# Patient Record
Sex: FEMALE | Race: WHITE | NOT HISPANIC OR LATINO
[De-identification: names, ages, dates, MRNs, and addresses within clinical notes are randomized per-mention and may not be internally consistent; named-entity substitution may affect disease eponyms.]

---

## 2017-04-04 ENCOUNTER — APPOINTMENT (OUTPATIENT)
Dept: HEMATOLOGY ONCOLOGY | Facility: CLINIC | Age: 82
End: 2017-04-04

## 2017-04-04 VITALS
SYSTOLIC BLOOD PRESSURE: 150 MMHG | RESPIRATION RATE: 14 BRPM | HEART RATE: 80 BPM | BODY MASS INDEX: 30.18 KG/M2 | HEIGHT: 62 IN | WEIGHT: 164 LBS | TEMPERATURE: 97.6 F | DIASTOLIC BLOOD PRESSURE: 90 MMHG

## 2017-04-06 LAB
BASOPHILS # BLD: 0.03 TH/MM3
BASOPHILS NFR BLD: 0.3 %
EOSINOPHIL # BLD: 0.2 TH/MM3
EOSINOPHIL NFR BLD: 2 %
ERYTHROCYTE [DISTWIDTH] IN BLOOD BY AUTOMATED COUNT: 13 %
GRANULOCYTES # BLD: 7.1 TH/MM3
GRANULOCYTES NFR BLD: 71.6 %
HCT VFR BLD AUTO: 36.2 %
HGB BLD-MCNC: 11.7 G/DL
IMM GRANULOCYTES # BLD: 0.09 TH/MM3
IMM GRANULOCYTES NFR BLD: 0.9 %
LYMPHOCYTES # BLD: 1.9 TH/MM3
LYMPHOCYTES NFR BLD: 19.2 %
MCH RBC QN AUTO: 28.7 PG
MCHC RBC AUTO-ENTMCNC: 32.3 G/DL
MCV RBC AUTO: 88.7 FL
MONOCYTES # BLD: 0.6 TH/MM3
MONOCYTES NFR BLD: 6 %
PLATELET # BLD: 81 TH/MM3
PMV BLD AUTO: 10.5 FL
RBC # BLD AUTO: 4.08 MIL/MM3
WBC # BLD: 9.92 TH/MM3

## 2017-04-18 ENCOUNTER — APPOINTMENT (OUTPATIENT)
Dept: HEMATOLOGY ONCOLOGY | Facility: CLINIC | Age: 82
End: 2017-04-18

## 2017-04-18 VITALS
DIASTOLIC BLOOD PRESSURE: 85 MMHG | WEIGHT: 163 LBS | BODY MASS INDEX: 30 KG/M2 | TEMPERATURE: 96.4 F | HEART RATE: 68 BPM | SYSTOLIC BLOOD PRESSURE: 185 MMHG | RESPIRATION RATE: 14 BRPM | HEIGHT: 62 IN

## 2017-04-18 LAB
BASOPHILS # BLD: 0.05 TH/MM3
BASOPHILS NFR BLD: 0.5 %
EOSINOPHIL # BLD: 0.42 TH/MM3
EOSINOPHIL NFR BLD: 4 %
ERYTHROCYTE [DISTWIDTH] IN BLOOD BY AUTOMATED COUNT: 12.8 %
GRANULOCYTES # BLD: 7.1 TH/MM3
GRANULOCYTES NFR BLD: 66.8 %
HCT VFR BLD AUTO: 38.1 %
HGB BLD-MCNC: 12.2 G/DL
IMM GRANULOCYTES # BLD: 0.05 TH/MM3
IMM GRANULOCYTES NFR BLD: 0.5 %
LYMPHOCYTES # BLD: 2.2 TH/MM3
LYMPHOCYTES NFR BLD: 20.8 %
MCH RBC QN AUTO: 28.4 PG
MCHC RBC AUTO-ENTMCNC: 32 G/DL
MCV RBC AUTO: 88.8 FL
MONOCYTES # BLD: 0.78 TH/MM3
MONOCYTES NFR BLD: 7.4 %
PLATELET # BLD: 223 TH/MM3
PMV BLD AUTO: 10 FL
RBC # BLD AUTO: 4.29 MIL/MM3
WBC # BLD: 10.6 TH/MM3

## 2017-06-13 ENCOUNTER — APPOINTMENT (OUTPATIENT)
Dept: HEMATOLOGY ONCOLOGY | Facility: CLINIC | Age: 82
End: 2017-06-13

## 2017-06-13 ENCOUNTER — RESULT REVIEW (OUTPATIENT)
Age: 82
End: 2017-06-13

## 2017-09-13 ENCOUNTER — OUTPATIENT (OUTPATIENT)
Dept: OUTPATIENT SERVICES | Facility: HOSPITAL | Age: 82
LOS: 1 days | Discharge: HOME | End: 2017-09-13

## 2017-09-13 ENCOUNTER — APPOINTMENT (OUTPATIENT)
Dept: HEMATOLOGY ONCOLOGY | Facility: CLINIC | Age: 82
End: 2017-09-13

## 2017-09-13 VITALS
HEART RATE: 68 BPM | OXYGEN SATURATION: 98 % | BODY MASS INDEX: 29.81 KG/M2 | SYSTOLIC BLOOD PRESSURE: 110 MMHG | HEIGHT: 62 IN | WEIGHT: 162 LBS | TEMPERATURE: 97.4 F | DIASTOLIC BLOOD PRESSURE: 62 MMHG | RESPIRATION RATE: 14 BRPM

## 2017-09-13 DIAGNOSIS — D69.3 IMMUNE THROMBOCYTOPENIC PURPURA: ICD-10-CM

## 2017-09-13 DIAGNOSIS — D64.9 ANEMIA, UNSPECIFIED: ICD-10-CM

## 2017-09-13 LAB
BASOPHILS # BLD: 0.03 TH/MM3
BASOPHILS NFR BLD: 0.3 %
EOSINOPHIL # BLD: 0.15 TH/MM3
EOSINOPHIL NFR BLD: 1.5 %
ERYTHROCYTE [DISTWIDTH] IN BLOOD BY AUTOMATED COUNT: 13.9 %
GRANULOCYTES # BLD: 6.99 TH/MM3
GRANULOCYTES NFR BLD: 69.7 %
HCT VFR BLD AUTO: 38.3 %
HGB BLD-MCNC: 12.2 G/DL
IMM GRANULOCYTES # BLD: 0.04 TH/MM3
IMM GRANULOCYTES NFR BLD: 0.4 %
LYMPHOCYTES # BLD: 2.03 TH/MM3
LYMPHOCYTES NFR BLD: 20.2 %
MCH RBC QN AUTO: 27.7 PG
MCHC RBC AUTO-ENTMCNC: 31.9 G/DL
MCV RBC AUTO: 87 FL
MONOCYTES # BLD: 0.79 TH/MM3
MONOCYTES NFR BLD: 7.9 %
PLATELET # BLD: 217 TH/MM3
PMV BLD AUTO: 10.6 FL
RBC # BLD AUTO: 4.4 MIL/MM3
WBC # BLD: 10.03 TH/MM3

## 2018-03-14 ENCOUNTER — APPOINTMENT (OUTPATIENT)
Dept: HEMATOLOGY ONCOLOGY | Facility: CLINIC | Age: 83
End: 2018-03-14

## 2018-05-08 ENCOUNTER — OUTPATIENT (OUTPATIENT)
Dept: OUTPATIENT SERVICES | Facility: HOSPITAL | Age: 83
LOS: 1 days | Discharge: HOME | End: 2018-05-08

## 2018-05-08 ENCOUNTER — APPOINTMENT (OUTPATIENT)
Dept: HEMATOLOGY ONCOLOGY | Facility: CLINIC | Age: 83
End: 2018-05-08

## 2018-05-08 ENCOUNTER — LABORATORY RESULT (OUTPATIENT)
Age: 83
End: 2018-05-08

## 2018-05-08 VITALS
HEART RATE: 70 BPM | SYSTOLIC BLOOD PRESSURE: 163 MMHG | WEIGHT: 160 LBS | HEIGHT: 62 IN | BODY MASS INDEX: 29.44 KG/M2 | TEMPERATURE: 96.8 F | DIASTOLIC BLOOD PRESSURE: 67 MMHG

## 2018-05-08 LAB
HCT VFR BLD CALC: 38.6 %
HGB BLD-MCNC: 12.4 G/DL
MCHC RBC-ENTMCNC: 28.2 PG
MCHC RBC-ENTMCNC: 32.1 G/DL
MCV RBC AUTO: 87.7 FL
PLATELET # BLD AUTO: 191 K/UL
PMV BLD: 10.9 FL
RBC # BLD: 4.4 M/UL
RBC # FLD: 14 %
WBC # FLD AUTO: 7.77 K/UL

## 2018-05-09 DIAGNOSIS — D69.3 IMMUNE THROMBOCYTOPENIC PURPURA: ICD-10-CM

## 2018-09-04 ENCOUNTER — INPATIENT (INPATIENT)
Facility: HOSPITAL | Age: 83
LOS: 9 days | Discharge: HOME HEALTH | End: 2018-09-14
Attending: INTERNAL MEDICINE | Admitting: INTERNAL MEDICINE

## 2018-09-04 VITALS
TEMPERATURE: 97 F | DIASTOLIC BLOOD PRESSURE: 62 MMHG | OXYGEN SATURATION: 90 % | SYSTOLIC BLOOD PRESSURE: 147 MMHG | RESPIRATION RATE: 18 BRPM | HEART RATE: 88 BPM

## 2018-09-04 DIAGNOSIS — Z95.0 PRESENCE OF CARDIAC PACEMAKER: Chronic | ICD-10-CM

## 2018-09-04 LAB
ALBUMIN SERPL ELPH-MCNC: 4.1 G/DL — SIGNIFICANT CHANGE UP (ref 3.5–5.2)
ALP SERPL-CCNC: 155 U/L — HIGH (ref 30–115)
ALT FLD-CCNC: 134 U/L — HIGH (ref 0–41)
ANION GAP SERPL CALC-SCNC: 18 MMOL/L — HIGH (ref 7–14)
APPEARANCE UR: ABNORMAL
APTT BLD: 31.3 SEC — SIGNIFICANT CHANGE UP (ref 27–39.2)
AST SERPL-CCNC: 147 U/L — HIGH (ref 0–41)
BASOPHILS # BLD AUTO: 0.03 K/UL — SIGNIFICANT CHANGE UP (ref 0–0.2)
BASOPHILS NFR BLD AUTO: 0.2 % — SIGNIFICANT CHANGE UP (ref 0–1)
BILIRUB SERPL-MCNC: 0.4 MG/DL — SIGNIFICANT CHANGE UP (ref 0.2–1.2)
BILIRUB UR-MCNC: NEGATIVE — SIGNIFICANT CHANGE UP
BUN SERPL-MCNC: 20 MG/DL — SIGNIFICANT CHANGE UP (ref 10–20)
CALCIUM SERPL-MCNC: 8.9 MG/DL — SIGNIFICANT CHANGE UP (ref 8.5–10.1)
CHLORIDE SERPL-SCNC: 99 MMOL/L — SIGNIFICANT CHANGE UP (ref 98–110)
CO2 SERPL-SCNC: 23 MMOL/L — SIGNIFICANT CHANGE UP (ref 17–32)
COLOR SPEC: YELLOW — SIGNIFICANT CHANGE UP
CREAT SERPL-MCNC: 0.9 MG/DL — SIGNIFICANT CHANGE UP (ref 0.7–1.5)
DIFF PNL FLD: ABNORMAL
EOSINOPHIL # BLD AUTO: 0 K/UL — SIGNIFICANT CHANGE UP (ref 0–0.7)
EOSINOPHIL NFR BLD AUTO: 0 % — SIGNIFICANT CHANGE UP (ref 0–8)
GAS PNL BLDV: SIGNIFICANT CHANGE UP
GLUCOSE SERPL-MCNC: 115 MG/DL — HIGH (ref 70–99)
GLUCOSE UR QL: NEGATIVE MG/DL — SIGNIFICANT CHANGE UP
HCT VFR BLD CALC: 44.1 % — SIGNIFICANT CHANGE UP (ref 37–47)
HGB BLD-MCNC: 13.9 G/DL — SIGNIFICANT CHANGE UP (ref 12–16)
IMM GRANULOCYTES NFR BLD AUTO: 0.5 % — HIGH (ref 0.1–0.3)
INR BLD: 1.09 RATIO — SIGNIFICANT CHANGE UP (ref 0.65–1.3)
KETONES UR-MCNC: NEGATIVE — SIGNIFICANT CHANGE UP
LACTATE SERPL-SCNC: 1.5 MMOL/L — SIGNIFICANT CHANGE UP (ref 0.5–2.2)
LEUKOCYTE ESTERASE UR-ACNC: ABNORMAL
LYMPHOCYTES # BLD AUTO: 1.32 K/UL — SIGNIFICANT CHANGE UP (ref 1.2–3.4)
LYMPHOCYTES # BLD AUTO: 8.7 % — LOW (ref 20.5–51.1)
MCHC RBC-ENTMCNC: 28.3 PG — SIGNIFICANT CHANGE UP (ref 27–31)
MCHC RBC-ENTMCNC: 31.5 G/DL — LOW (ref 32–37)
MCV RBC AUTO: 89.8 FL — SIGNIFICANT CHANGE UP (ref 81–99)
MONOCYTES # BLD AUTO: 1.07 K/UL — HIGH (ref 0.1–0.6)
MONOCYTES NFR BLD AUTO: 7 % — SIGNIFICANT CHANGE UP (ref 1.7–9.3)
NEUTROPHILS # BLD AUTO: 12.75 K/UL — HIGH (ref 1.4–6.5)
NEUTROPHILS NFR BLD AUTO: 83.6 % — HIGH (ref 42.2–75.2)
NITRITE UR-MCNC: NEGATIVE — SIGNIFICANT CHANGE UP
NT-PROBNP SERPL-SCNC: HIGH PG/ML (ref 0–300)
PH UR: 5.5 — SIGNIFICANT CHANGE UP (ref 5–8)
PLATELET # BLD AUTO: 276 K/UL — SIGNIFICANT CHANGE UP (ref 130–400)
POTASSIUM SERPL-MCNC: 5.2 MMOL/L — HIGH (ref 3.5–5)
POTASSIUM SERPL-SCNC: 5.2 MMOL/L — HIGH (ref 3.5–5)
PROT SERPL-MCNC: 6.1 G/DL — SIGNIFICANT CHANGE UP (ref 6–8)
PROT UR-MCNC: 100 MG/DL
PROTHROM AB SERPL-ACNC: 11.8 SEC — SIGNIFICANT CHANGE UP (ref 9.95–12.87)
RBC # BLD: 4.91 M/UL — SIGNIFICANT CHANGE UP (ref 4.2–5.4)
RBC # FLD: 13.1 % — SIGNIFICANT CHANGE UP (ref 11.5–14.5)
SODIUM SERPL-SCNC: 140 MMOL/L — SIGNIFICANT CHANGE UP (ref 135–146)
SP GR SPEC: 1.02 — SIGNIFICANT CHANGE UP (ref 1.01–1.03)
TROPONIN T SERPL-MCNC: 0.02 NG/ML — HIGH
UROBILINOGEN FLD QL: 0.2 MG/DL — SIGNIFICANT CHANGE UP (ref 0.2–0.2)
WBC # BLD: 15.24 K/UL — HIGH (ref 4.8–10.8)
WBC # FLD AUTO: 15.24 K/UL — HIGH (ref 4.8–10.8)

## 2018-09-04 RX ORDER — FUROSEMIDE 40 MG
40 TABLET ORAL ONCE
Qty: 0 | Refills: 0 | Status: COMPLETED | OUTPATIENT
Start: 2018-09-04 | End: 2018-09-04

## 2018-09-04 RX ORDER — CHLORHEXIDINE GLUCONATE 213 G/1000ML
1 SOLUTION TOPICAL
Qty: 0 | Refills: 0 | Status: DISCONTINUED | OUTPATIENT
Start: 2018-09-04 | End: 2018-09-14

## 2018-09-04 RX ORDER — CEFTRIAXONE 500 MG/1
INJECTION, POWDER, FOR SOLUTION INTRAMUSCULAR; INTRAVENOUS
Qty: 0 | Refills: 0 | Status: DISCONTINUED | OUTPATIENT
Start: 2018-09-04 | End: 2018-09-05

## 2018-09-04 RX ORDER — CHOLESTYRAMINE 4 G/9G
4 POWDER, FOR SUSPENSION ORAL DAILY
Qty: 0 | Refills: 0 | Status: DISCONTINUED | OUTPATIENT
Start: 2018-09-04 | End: 2018-09-14

## 2018-09-04 RX ORDER — PANTOPRAZOLE SODIUM 20 MG/1
40 TABLET, DELAYED RELEASE ORAL
Qty: 0 | Refills: 0 | Status: DISCONTINUED | OUTPATIENT
Start: 2018-09-04 | End: 2018-09-14

## 2018-09-04 RX ORDER — MESALAMINE 400 MG
1200 TABLET, DELAYED RELEASE (ENTERIC COATED) ORAL
Qty: 0 | Refills: 0 | Status: DISCONTINUED | OUTPATIENT
Start: 2018-09-04 | End: 2018-09-14

## 2018-09-04 RX ORDER — ENOXAPARIN SODIUM 100 MG/ML
40 INJECTION SUBCUTANEOUS DAILY
Qty: 0 | Refills: 0 | Status: DISCONTINUED | OUTPATIENT
Start: 2018-09-04 | End: 2018-09-12

## 2018-09-04 RX ORDER — FUROSEMIDE 40 MG
40 TABLET ORAL
Qty: 0 | Refills: 0 | Status: DISCONTINUED | OUTPATIENT
Start: 2018-09-04 | End: 2018-09-06

## 2018-09-04 RX ORDER — DIPHENOXYLATE HCL/ATROPINE 2.5-.025MG
1 TABLET ORAL DAILY
Qty: 0 | Refills: 0 | Status: DISCONTINUED | OUTPATIENT
Start: 2018-09-04 | End: 2018-09-14

## 2018-09-04 RX ORDER — CEFTRIAXONE 500 MG/1
1 INJECTION, POWDER, FOR SOLUTION INTRAMUSCULAR; INTRAVENOUS EVERY 24 HOURS
Qty: 0 | Refills: 0 | Status: DISCONTINUED | OUTPATIENT
Start: 2018-09-05 | End: 2018-09-05

## 2018-09-04 RX ORDER — CEFTRIAXONE 500 MG/1
1 INJECTION, POWDER, FOR SOLUTION INTRAMUSCULAR; INTRAVENOUS ONCE
Qty: 0 | Refills: 0 | Status: COMPLETED | OUTPATIENT
Start: 2018-09-04 | End: 2018-09-04

## 2018-09-04 RX ADMIN — CEFTRIAXONE 100 GRAM(S): 500 INJECTION, POWDER, FOR SOLUTION INTRAMUSCULAR; INTRAVENOUS at 23:24

## 2018-09-04 RX ADMIN — Medication 40 MILLIGRAM(S): at 15:55

## 2018-09-04 NOTE — ED PROVIDER NOTE - ATTENDING CONTRIBUTION TO CARE
86 year old female, pmhx ITP, diverticulitis, PPM, presents after an episode of AMS. Family states that for the past 2-3 weeks, patient has been waking in a state of delirium, screaming out in distress. Usually resolves after a few minutes until today where it was prolonged, almost an hour, prompting the family to bring patient in for eval. Worked up by PMD for neuro etiology, negative work up thus far. Denies other symptoms or complaints.    Vital Signs: I have reviewed the initial vital signs.  Constitutional: NAD, well-nourished, appears stated age, no acute distress.  HEENT: Airway patent, moist MM, no erythema/swelling/deformity of oral structures. EOMI, PERRLA.  CV: regular rate, regular rhythm, well-perfused extremities, 2+ b/l DP and radial pulses equal.  Lungs: BCTA, no increased WOB.  ABD: NTND, no guarding or rebound, no pulsatile mass, no hernias.   MSK: Neck supple, nontender, nl ROM, no stepoff. Chest nontender. Back nontender in TLS spine or to b/l bony structures or flanks. Ext nontender, nl rom, no deformity.  2+ bilateral pitting edema.  INTEG: Skin warm, dry, no rash.  NEURO:  normal strength 5/5 all 4 ext, nl sensation throughout, normal speech and coordination.  PSYCH: Calm, cooperative, normal affect and interaction.    Will check labs, CT head, re-eval.

## 2018-09-04 NOTE — H&P ADULT - ASSESSMENT
86 year old lady with PMHx ITP, diverticulitis, PPM presents after an episode of AMS a/w SOB, RAYA, orthopnea.    SOB, RAYA, ELIZABETH 2/2 acute CHF exacerbation due to underlying infection likely UTI  -admit to telemetry  -BNP 40135 on admission  -CXR: interval CHF  -c/w lasix IV   -no previous echo  -monitor strict I&O, daily weights  -1.5L/day fluid restriction  -c/w BiPAP PRN  -f/u TTE to assess EF  -f/u cardiology    AMS 2/2 UTI vs underlying dementia   -WBC 15 on admission, afebrile. No complaints of dysuria, increased frequency. Possible asymptomatic bactiuria  -UA(+) in ED  -f/u urine cx, blood cx  -start rocephin for now  -consider ID consult if MDR or if pt condition worsens    ITP  -stable  -c/w home meds    DVT ppx  GI ppx  CHG 4% daily  ambulate as tolerated  DASH/TLC with 1.5L/day fluid restriction    Dispo  -from home, lives with   -consider PT/rehab     FULL CODE    **CALL PHARMACY IN AM TO CONFIRM MEDICATIONS. MEDICATIONS ORDERED ARE PER DAUGHTER, YUSUF BUT SHE IS UNSURE**

## 2018-09-04 NOTE — ED PROVIDER NOTE - PROGRESS NOTE DETAILS
Patient had episode of respiratory distress with sat in Low 80s. Started on BiPAP with significant improvement, well tolerated.

## 2018-09-04 NOTE — H&P ADULT - NSHPOUTPATIENTPROVIDERS_GEN_ALL_CORE
PMD: Dr. Waleska Robertson PMD: Dr. Waleska Robertson  Cardio: Dr. Jacinto  Heme: Dr. White PMD: Dr. Waleska Robertson  Cardio: Dr. Jacinto  GI: Dr. Le  Heme: Dr. White    Pharmacy: [facility]

## 2018-09-04 NOTE — ED PROVIDER NOTE - OBJECTIVE STATEMENT
85 yo F with a hx ITP, diverticulitis, pacemaker, presents after an episode of AMS. Family states that for the past 2-3 weeks, patient has been waking in a state of delirium, screaming out in distress. Usually resolves after a few minutes until today where it was prolonged, almost an hour, prompting faimly to bring patient in for eval. Worked up by PMD for neuro etiology, negative work up thus far. Did not get a chance to see a neurologist yet. Denies CP, abd pain, NVCD. Patient currently at baseline.

## 2018-09-04 NOTE — ED PROVIDER NOTE - MEDICAL DECISION MAKING DETAILS
Patient presented with episode of AMS, found to be in heart failure. Became dyspneic during ED stay, wound up requiring bipap. No pmhx of CHF in the past per family. Patient had normal head CT, labs otherwise not concerning. Patient stabilized with bipap and felt better. Will admit for AMS, new onset CHF requiring bipap.

## 2018-09-04 NOTE — H&P ADULT - NSHPLABSRESULTS_GEN_ALL_CORE
13.9   15.24 )-----------( 276      ( 04 Sep 2018 13:33 )             44.1                                       140  |  99  |  20  ----------------------------<  115<H>  5.2<H>   |  23  |  0.9    Ca    8.9      04 Sep 2018 13:33    TPro  6.1  /  Alb  4.1  /  TBili  0.4  /  DBili  x   /  AST  147<H>  /  ALT  134<H>  /  AlkPhos  155<H>      LIVER FUNCTIONS - ( 04 Sep 2018 13:33 )  Alb: 4.1 g/dL / Pro: 6.1 g/dL / ALK PHOS: 155 U/L / ALT: 134 U/L / AST: 147 U/L / GGT: x         PT/INR - ( 04 Sep 2018 13:33 )   PT: 11.80 sec;   INR: 1.09 ratio         PTT - ( 04 Sep 2018 13:33 )  PTT:31.3 sec  CARDIAC MARKERS ( 04 Sep 2018 13:33 )  x     / 0.02 ng/mL / x     / x     / x        Lactate, Blood: 1.5 mmol/L (18 @ 13:33)    Urinalysis Basic - ( 04 Sep 2018 13:33 )    Color: Yellow / Appearance: Cloudy / S.020 / pH: x  Gluc: x / Ketone: Negative  / Bili: Negative / Urobili: 0.2 mg/dL   Blood: x / Protein: 100 mg/dL / Nitrite: Negative   Leuk Esterase: Small / RBC: 1-2 /HPF / WBC 6-10 /HPF   Sq Epi: x / Non Sq Epi: Moderate /HPF / Bacteria: Many /HPF    Lactate, Blood: 1.5 mmol/L (18 @ 13:33)  Serum Pro-Brain Natriuretic Peptide: 85828 pg/mL (18 @ 13:33)     Troponin T, Serum: 0.02 ng/mL (18 @ 13:33)    Serum Pro-Brain Natriuretic Peptide: 93031 pg/mL (18 @ 13:33)      Imaging:    < from: CT Head No Cont (18 @ 17:54) >    IMPRESSION:    No CT evidence for acute intracranial pathology.      < end of copied text >    < from: Xray Chest 1 View-PORTABLE IMMEDIATE (18 @ 16:11) >    Impression:      Interval CHF with small layering effusions    < end of copied text >      12 Lead ECG:   Ventricular Rate 93 BPM  Atrial Rate 93 BPM  P-R Interval 180 ms  QRS Duration 84 ms  Q-T Interval 350 ms  QTC Calculation(Bezet) 435 ms    Diagnosis Line Normal sinus rhythm  ST & T wave abnormality, consider inferolateral ischemia  Abnormal ECG

## 2018-09-04 NOTE — H&P ADULT - NSHPPHYSICALEXAM_GEN_ALL_CORE
GENERAL: NAD, well-groomed, well-developed  HEAD:  NCAT  NERVOUS SYSTEM: AAOX3  CHEST/LUNG: bibasilar crackles   HEART: +s1s2 RRR no m/g/r  ABDOMEN: soft, NT/ND (+) bs, no HSM  EXTREMITIES:  2+ Peripheral Pulses, No c/c/e  LYMPH: No lymphadenopathy noted  SKIN: No rashes or lesions

## 2018-09-04 NOTE — H&P ADULT - NSHPREVIEWOFSYSTEMS_GEN_ALL_CORE
CONSTITUTIONAL: No fever, weight loss, or fatigue  NECK: No pain or stiffness  RESPIRATORY: No cough, wheezing, chills or hemoptysis; (+) shortness of breath, orthopnea, RAYA  CARDIOVASCULAR: No chest pain, palpitations, dizziness. (+) leg swelling  GASTROINTESTINAL: No abdominal or epigastric pain. No nausea, vomiting, or hematemesis; No diarrhea or constipation. No melena or hematochezia.  GENITOURINARY: No dysuria, frequency, hematuria, or incontinence  NEUROLOGICAL: No headaches, loss of strength, numbness, or tremors. (+) AMS  SKIN: No itching, burning, rashes, or lesions   LYMPH NODES: No enlarged glands  MUSCULOSKELETAL: No joint pain or swelling; No muscle, back, or extremity pain

## 2018-09-04 NOTE — H&P ADULT - HISTORY OF PRESENT ILLNESS
86 year old lady with PMHx ITP, diverticulitis, PPM presents after an episode of AMS. As per family, this has been recurring for the past 2-3 weeks where the patient has been waking in a state of delirium and screaming out in distress and the episodes usually resolve after a few minutes. However, today this episode was much more prolonged, lasting almost an hour, so the family brought her to the ED for further evaluation. Of note, the patient was worked up by her PMD for possible neurologic etiology and it has been negative thus far. She has not had a chance to see her neurologist yet. Denies cp, palpitations, abd pain, n/v/d/c, numbness, paresthesia, recent travel, sick contacts. Admits to SOB, DOROTA JOHNSON, orthopnea.    In ED, T 96.7F HR 88 bp 147/62 90% on RA. CTHNC was done due to AMS which came back unremarkable. Patient found to be in CHF and required BiPAP which made her feel better with improvement of saturation and was given lasix 40mg IV x1. As per family, she has no history of heart failure. BNP on admission was 35675. 86 year old lady with PMHx ITP, diverticulitis, PPM presents after an episode of AMS. As per family, this has been recurring for the past 2-3 weeks where the patient has been waking in a state of delirium and screaming out in distress and the episodes usually resolve after a few minutes. However, today this episode was much more prolonged, lasting almost an hour, so the family brought her to the ED for further evaluation. Of note, the patient was worked up by her PMD for possible neurologic etiology and it has been negative thus far. She has not had a chance to see her neurologist yet. Denies cp, palpitations, abd pain, n/v/d/c, numbness, paresthesia, recent travel, sick contacts. Admits to EHSAN, DOROTA JOHNSON, orthopnea.    In ED, T 96.7F HR 88 bp 147/62 90% on RA. CTHNC was done due to AMS which came back unremarkable. Patient found to be in CHF and required BiPAP which made her feel better with improvement of saturation and was given lasix 40mg IV x1. BNP on admission was 39275. 86 year old lady with PMHx ITP, diverticulitis, PPM presents after an episode of AMS a/w SOB, RAYA, orthopnea. As per family, this has been recurring for the past 2-3 weeks where the patient has been waking in a state of delirium and screaming out in distress and the episodes usually resolve after a few minutes. However, today this episode was much more prolonged, lasting almost an hour, so the family brought her to the ED for further evaluation. Of note, the patient was worked up by her PMD for possible neurologic etiology and it has been negative thus far. She has not had a chance to see her neurologist yet. Denies cp, palpitations, abd pain, n/v/d/c, numbness, paresthesia, recent travel, sick contacts. Admits to SOB, ELIZABETH, RAYA, orthopnea.    In ED, T 96.7F HR 88 bp 147/62 90% on RA. CTHNC was done due to AMS which came back unremarkable. Patient found to be in CHF and required BiPAP which made her feel better with improvement of saturation and was given lasix 40mg IV x1. BNP on admission was 00065.

## 2018-09-04 NOTE — ED PROVIDER NOTE - PHYSICAL EXAMINATION
AOx4, Non toxic appearing, NAD, speaking in full sentences. Skin  warm and dry, no acute rash. Head normocephalic, atraumatic. PERRLA/EOMI, conjunctiva and sclera clear. MM moist, no nasal discharge.  Pharynx and TM's unremarkable.  No mastoid or temporal ttp. Neck supple nt, no meningeal signs. Heart RRR s1s2 nl, no rub/murmur. Lungs- No retractions, BS equal, CTAB. Abdomen soft ntnd no r/g. Extremities- moves all, +equal distal pulses, brisk cap refill, sensation wnl, normal ROM. + 2-3+ pitting LE edema.

## 2018-09-04 NOTE — H&P ADULT - ATTENDING COMMENTS
86yr old female presented with altered mental status  VITAL SIGNS (Last 24 hrs):  T(C): 36.2 (09-05-18 @ 08:24), Max: 37.4 (09-04-18 @ 15:55)  HR: 85 (09-05-18 @ 08:24) (67 - 85)  BP: 130/72 (09-05-18 @ 08:24) (130/72 - 136/76)  RR: 18 (09-05-18 @ 08:24) (18 - 33)  SpO2: 97% (09-05-18 @ 08:24) (95% - 97%)  Wt(kg): --  Daily     Daily     I&O's Summary                        12.5   7.64  )-----------( 207      ( 05 Sep 2018 08:26 )             40.5   09-05    143  |  97<L>  |  19  ----------------------------<  71  4.3   |  25  |  0.8    Ca    8.5      05 Sep 2018 08:26  Mg     1.7     09-05    TPro  6.1  /  Alb  4.1  /  TBili  0.4  /  DBili  x   /  AST  147<H>  /  ALT  134<H>  /  AlkPhos  155<H>  09-04  ekg-NSR Rate93/min WITH ST depressions in inferolateral leads  O/e  Awake  chest-b/l air entry  cvs-s1s2n  abd/GI-soft-soft, bs+  Assessment and PLan:  # AC metabolic Encephalopathy due to SOB on baseline dementia  # Ac CHF exacerbation- on IV lasix 86yr old female presented with altered mental status  VITAL SIGNS (Last 24 hrs):  T(C): 36.2 (09-05-18 @ 08:24), Max: 37.4 (09-04-18 @ 15:55)  HR: 85 (09-05-18 @ 08:24) (67 - 85)  BP: 130/72 (09-05-18 @ 08:24) (130/72 - 136/76)  RR: 18 (09-05-18 @ 08:24) (18 - 33)  SpO2: 97% (09-05-18 @ 08:24) (95% - 97%)  Wt(kg): --  Daily     Daily     I&O's Summary                        12.5   7.64  )-----------( 207      ( 05 Sep 2018 08:26 )             40.5   09-05    143  |  97<L>  |  19  ----------------------------<  71  4.3   |  25  |  0.8    Ca    8.5      05 Sep 2018 08:26  Mg     1.7     09-05    TPro  6.1  /  Alb  4.1  /  TBili  0.4  /  DBili  x   /  AST  147<H>  /  ALT  134<H>  /  AlkPhos  155<H>  09-04  ekg-NSR Rate93/min WITH ST depressions in inferolateral leads  O/e  Awake  chest-b/l air entry  cvs-s1s2n  abd/GI-soft-soft, bs+  Assessment and PLan:  # AC metabolic Encephalopathy due to SOB resolved  # Ac CHF exacerbation- on IV lasix-- cardiology Steven Shaikh-- repeat CXR  # hx of thrombocytopenia- has been on treatment in the past and now counts are stable  # Dementia is very mild and is able to answer questions  #UTI asymptomatic DC ABX

## 2018-09-04 NOTE — ED PROVIDER NOTE - NS ED ROS FT
Constitutional: See HPI.  Eyes: No visual changes, eye pain or discharge. No Photophobia  ENMT: No neck pain or stiffness. No limited ROM  Cardiac: No SOB or edema. No chest pain with exertion.  Respiratory: No cough. No hemoptysis.   GI: No nausea, vomiting, diarrhea or abdominal pain.  : No dysuria, frequency or burning. No Discharge  MS: No myalgia, muscle weakness, joint pain or back pain.  Neuro: No headache or weakness.   Skin: No skin rash.  Except as documented in the HPI, all other systems are negative.

## 2018-09-05 ENCOUNTER — APPOINTMENT (OUTPATIENT)
Dept: CARDIOLOGY | Facility: CLINIC | Age: 83
End: 2018-09-05

## 2018-09-05 LAB
ANION GAP SERPL CALC-SCNC: 21 MMOL/L — HIGH (ref 7–14)
BASOPHILS # BLD AUTO: 0.02 K/UL — SIGNIFICANT CHANGE UP (ref 0–0.2)
BASOPHILS NFR BLD AUTO: 0.3 % — SIGNIFICANT CHANGE UP (ref 0–1)
BUN SERPL-MCNC: 19 MG/DL — SIGNIFICANT CHANGE UP (ref 10–20)
CALCIUM SERPL-MCNC: 8.5 MG/DL — SIGNIFICANT CHANGE UP (ref 8.5–10.1)
CHLORIDE SERPL-SCNC: 97 MMOL/L — LOW (ref 98–110)
CO2 SERPL-SCNC: 25 MMOL/L — SIGNIFICANT CHANGE UP (ref 17–32)
CREAT SERPL-MCNC: 0.8 MG/DL — SIGNIFICANT CHANGE UP (ref 0.7–1.5)
EOSINOPHIL # BLD AUTO: 0.03 K/UL — SIGNIFICANT CHANGE UP (ref 0–0.7)
EOSINOPHIL NFR BLD AUTO: 0.4 % — SIGNIFICANT CHANGE UP (ref 0–8)
GLUCOSE BLDC GLUCOMTR-MCNC: 132 MG/DL — HIGH (ref 70–99)
GLUCOSE SERPL-MCNC: 71 MG/DL — SIGNIFICANT CHANGE UP (ref 70–99)
HCT VFR BLD CALC: 40.5 % — SIGNIFICANT CHANGE UP (ref 37–47)
HGB BLD-MCNC: 12.5 G/DL — SIGNIFICANT CHANGE UP (ref 12–16)
IMM GRANULOCYTES NFR BLD AUTO: 0.4 % — HIGH (ref 0.1–0.3)
LYMPHOCYTES # BLD AUTO: 1.03 K/UL — LOW (ref 1.2–3.4)
LYMPHOCYTES # BLD AUTO: 13.5 % — LOW (ref 20.5–51.1)
MAGNESIUM SERPL-MCNC: 1.7 MG/DL — LOW (ref 1.8–2.4)
MCHC RBC-ENTMCNC: 27.8 PG — SIGNIFICANT CHANGE UP (ref 27–31)
MCHC RBC-ENTMCNC: 30.9 G/DL — LOW (ref 32–37)
MCV RBC AUTO: 90 FL — SIGNIFICANT CHANGE UP (ref 81–99)
MONOCYTES # BLD AUTO: 0.44 K/UL — SIGNIFICANT CHANGE UP (ref 0.1–0.6)
MONOCYTES NFR BLD AUTO: 5.8 % — SIGNIFICANT CHANGE UP (ref 1.7–9.3)
NEUTROPHILS # BLD AUTO: 6.09 K/UL — SIGNIFICANT CHANGE UP (ref 1.4–6.5)
NEUTROPHILS NFR BLD AUTO: 79.6 % — HIGH (ref 42.2–75.2)
NRBC # BLD: 0 /100 WBCS — SIGNIFICANT CHANGE UP (ref 0–0)
PLATELET # BLD AUTO: 207 K/UL — SIGNIFICANT CHANGE UP (ref 130–400)
POTASSIUM SERPL-MCNC: 4.3 MMOL/L — SIGNIFICANT CHANGE UP (ref 3.5–5)
POTASSIUM SERPL-SCNC: 4.3 MMOL/L — SIGNIFICANT CHANGE UP (ref 3.5–5)
RBC # BLD: 4.5 M/UL — SIGNIFICANT CHANGE UP (ref 4.2–5.4)
RBC # FLD: 13 % — SIGNIFICANT CHANGE UP (ref 11.5–14.5)
SODIUM SERPL-SCNC: 143 MMOL/L — SIGNIFICANT CHANGE UP (ref 135–146)
WBC # BLD: 7.64 K/UL — SIGNIFICANT CHANGE UP (ref 4.8–10.8)
WBC # FLD AUTO: 7.64 K/UL — SIGNIFICANT CHANGE UP (ref 4.8–10.8)

## 2018-09-05 RX ADMIN — CHLORHEXIDINE GLUCONATE 1 APPLICATION(S): 213 SOLUTION TOPICAL at 05:18

## 2018-09-05 RX ADMIN — Medication 5 MILLIGRAM(S): at 05:17

## 2018-09-05 RX ADMIN — Medication 1200 MILLIGRAM(S): at 17:21

## 2018-09-05 RX ADMIN — Medication 1200 MILLIGRAM(S): at 05:17

## 2018-09-05 RX ADMIN — CHOLESTYRAMINE 4 GRAM(S): 4 POWDER, FOR SUSPENSION ORAL at 12:28

## 2018-09-05 RX ADMIN — ENOXAPARIN SODIUM 40 MILLIGRAM(S): 100 INJECTION SUBCUTANEOUS at 12:02

## 2018-09-05 RX ADMIN — PANTOPRAZOLE SODIUM 40 MILLIGRAM(S): 20 TABLET, DELAYED RELEASE ORAL at 05:17

## 2018-09-05 RX ADMIN — Medication 40 MILLIGRAM(S): at 05:17

## 2018-09-05 RX ADMIN — Medication 40 MILLIGRAM(S): at 17:21

## 2018-09-05 NOTE — PROGRESS NOTE ADULT - SUBJECTIVE AND OBJECTIVE BOX
Patient is a 86y old  Female who presents with a chief complaint of AMS (04 Sep 2018 20:30)      PAST MEDICAL & SURGICAL HISTORY:  Immune thrombocytopenia  Artificial cardiac pacemaker      MEDICATIONS  (STANDING):  chlorhexidine 4% Liquid 1 Application(s) Topical <User Schedule>  cholestyramine Powder (Sugar-Free) 4 Gram(s) Oral daily  diphenoxylate/atropine 1 Tablet(s) Oral daily  enoxaparin Injectable 40 milliGRAM(s) SubCutaneous daily  furosemide   Injectable 40 milliGRAM(s) IV Push two times a day  mesalamine DR Capsule 1200 milliGRAM(s) Oral two times a day  pantoprazole    Tablet 40 milliGRAM(s) Oral before breakfast  predniSONE   Tablet 5 milliGRAM(s) Oral daily    MEDICATIONS  (PRN):      Overnight events:    Vital Signs Last 24 Hrs  T(C): 36.2 (05 Sep 2018 08:24), Max: 37.4 (04 Sep 2018 15:55)  T(F): 97.2 (05 Sep 2018 08:24), Max: 99.4 (04 Sep 2018 15:55)  HR: 85 (05 Sep 2018 08:24) (67 - 85)  BP: 130/72 (05 Sep 2018 08:24) (130/72 - 136/76)  BP(mean): --  RR: 18 (05 Sep 2018 08:24) (18 - 33)  SpO2: 97% (05 Sep 2018 08:24) (95% - 97%)  CAPILLARY BLOOD GLUCOSE        I&O's Summary      PHYSICAL EXAM:  GENERAL: NAD, speaks in full sentences, no signs of respiratory distress  HEAD:  Atraumatic, Normocephalic  EYES: EOMI, PERRLA, conjunctiva and sclera clear  NECK: Supple, No JVD  CHEST/LUNG: Clear to auscultation bilaterally; No wheeze; No crackles; No accessory muscles used  HEART: Regular rate and rhythm; No murmurs;   ABDOMEN: Soft, Nontender, Nondistended; Bowel sounds present; No guarding  EXTREMITIES:  2+ Peripheral Pulses, No cyanosis or edema  PSYCH: AAOx2  NEUROLOGY: non-focal  SKIN: No rashes or lesions        Labs:                        12.5   7.64  )-----------( 207      ( 05 Sep 2018 08:26 )             40.5             09-05    143  |  97<L>  |  19  ----------------------------<  71  4.3   |  25  |  0.8    Ca    8.5      05 Sep 2018 08:26  Mg     1.7         TPro  6.1  /  Alb  4.1  /  TBili  0.4  /  DBili  x   /  AST  147<H>  /  ALT  134<H>  /  AlkPhos  155<H>      LIVER FUNCTIONS - ( 04 Sep 2018 13:33 )  Alb: 4.1 g/dL / Pro: 6.1 g/dL / ALK PHOS: 155 U/L / ALT: 134 U/L / AST: 147 U/L / GGT: x                 PT/INR - ( 04 Sep 2018 13:33 )   PT: 11.80 sec;   INR: 1.09 ratio         PTT - ( 04 Sep 2018 13:33 )  PTT:31.3 sec  CARDIAC MARKERS ( 04 Sep 2018 13:33 )  x     / 0.02 ng/mL / x     / x     / x            Urinalysis Basic - ( 04 Sep 2018 13:33 )    Color: Yellow / Appearance: Cloudy / S.020 / pH: x  Gluc: x / Ketone: Negative  / Bili: Negative / Urobili: 0.2 mg/dL   Blood: x / Protein: 100 mg/dL / Nitrite: Negative   Leuk Esterase: Small / RBC: 1-2 /HPF / WBC 6-10 /HPF   Sq Epi: x / Non Sq Epi: Moderate /HPF / Bacteria: Many /HPF          Imaging:    ECG:    T(C): 36.2 (18 @ 08:24), Max: 37.4 (18 @ 15:55)  HR: 85 (18 @ 08:24) (67 - 85)  BP: 130/72 (18 @ 08:24) (130/72 - 136/76)  RR: 18 (18 @ 08:24) (18 - 33)  SpO2: 97% (18 @ 08:24) (95% - 97%)

## 2018-09-05 NOTE — PROGRESS NOTE ADULT - ASSESSMENT
86 year old lady with PMHx ITP, diverticulitis, PPM presents after an episode of AMS a/w SOB, RAYA, orthopnea.    #SOB Likely secondary to CHF Exacerbation   -BNP 81442 on admission  -CXR: interval CHF  -c/w lasix IV   -no previous echo  -monitor strict I&O, daily weights  -1.5L/day fluid restriction  -c/w BiPAP PRN  -f/u TTE to assess EF  -f/u cardiology    #AMS 2/2 UTI vs underlying dementia   -WBC 15 on admission, afebrile. No complaints of dysuria, increased frequency. Possible asymptomatic bactiuria  -UA(+) in ED  -f/u urine cx, blood cx  -start Rocephin for now  -consider ID consult if MDR or if pt condition worsens    #ITP  -stable  -c/w home meds    #Abnormal LFT's   Follow with RUQ Abdominal sonogram   Repeat LFT's in AM.     DVT ppx  GI ppx  CHG 4% daily  ambulate as tolerated  DASH/TLC with 1.5L/day fluid restriction    Dispo  -from home, lives with   -consider PT/rehab     FULL CODE

## 2018-09-05 NOTE — ED ADULT NURSE NOTE - NSIMPLEMENTINTERV_GEN_ALL_ED
Implemented All Fall with Harm Risk Interventions:  Bath to call system. Call bell, personal items and telephone within reach. Instruct patient to call for assistance. Room bathroom lighting operational. Non-slip footwear when patient is off stretcher. Physically safe environment: no spills, clutter or unnecessary equipment. Stretcher in lowest position, wheels locked, appropriate side rails in place. Provide visual cue, wrist band, yellow gown, etc. Monitor gait and stability. Monitor for mental status changes and reorient to person, place, and time. Review medications for side effects contributing to fall risk. Reinforce activity limits and safety measures with patient and family. Provide visual clues: red socks.

## 2018-09-06 LAB
-  AMIKACIN: SIGNIFICANT CHANGE UP
-  AMOXICILLIN/CLAVULANIC ACID: SIGNIFICANT CHANGE UP
-  AMPICILLIN/SULBACTAM: SIGNIFICANT CHANGE UP
-  AMPICILLIN: SIGNIFICANT CHANGE UP
-  AZTREONAM: SIGNIFICANT CHANGE UP
-  CEFAZOLIN: SIGNIFICANT CHANGE UP
-  CEFEPIME: SIGNIFICANT CHANGE UP
-  CEFOXITIN: SIGNIFICANT CHANGE UP
-  CEFTRIAXONE: SIGNIFICANT CHANGE UP
-  CIPROFLOXACIN: SIGNIFICANT CHANGE UP
-  ERTAPENEM: SIGNIFICANT CHANGE UP
-  GENTAMICIN: SIGNIFICANT CHANGE UP
-  IMIPENEM: SIGNIFICANT CHANGE UP
-  LEVOFLOXACIN: SIGNIFICANT CHANGE UP
-  MEROPENEM: SIGNIFICANT CHANGE UP
-  NITROFURANTOIN: SIGNIFICANT CHANGE UP
-  PIPERACILLIN/TAZOBACTAM: SIGNIFICANT CHANGE UP
-  TIGECYCLINE: SIGNIFICANT CHANGE UP
-  TOBRAMYCIN: SIGNIFICANT CHANGE UP
-  TRIMETHOPRIM/SULFAMETHOXAZOLE: SIGNIFICANT CHANGE UP
ANION GAP SERPL CALC-SCNC: 19 MMOL/L — HIGH (ref 7–14)
BUN SERPL-MCNC: 20 MG/DL — SIGNIFICANT CHANGE UP (ref 10–20)
CALCIUM SERPL-MCNC: 8.6 MG/DL — SIGNIFICANT CHANGE UP (ref 8.5–10.1)
CHLORIDE SERPL-SCNC: 96 MMOL/L — LOW (ref 98–110)
CO2 SERPL-SCNC: 29 MMOL/L — SIGNIFICANT CHANGE UP (ref 17–32)
CREAT SERPL-MCNC: 0.9 MG/DL — SIGNIFICANT CHANGE UP (ref 0.7–1.5)
CULTURE RESULTS: SIGNIFICANT CHANGE UP
GLUCOSE SERPL-MCNC: 155 MG/DL — HIGH (ref 70–99)
HCT VFR BLD CALC: 43.7 % — SIGNIFICANT CHANGE UP (ref 37–47)
HGB BLD-MCNC: 13.9 G/DL — SIGNIFICANT CHANGE UP (ref 12–16)
MCHC RBC-ENTMCNC: 27.8 PG — SIGNIFICANT CHANGE UP (ref 27–31)
MCHC RBC-ENTMCNC: 31.8 G/DL — LOW (ref 32–37)
MCV RBC AUTO: 87.4 FL — SIGNIFICANT CHANGE UP (ref 81–99)
METHOD TYPE: SIGNIFICANT CHANGE UP
NRBC # BLD: 0 /100 WBCS — SIGNIFICANT CHANGE UP (ref 0–0)
ORGANISM # SPEC MICROSCOPIC CNT: SIGNIFICANT CHANGE UP
ORGANISM # SPEC MICROSCOPIC CNT: SIGNIFICANT CHANGE UP
PLATELET # BLD AUTO: 239 K/UL — SIGNIFICANT CHANGE UP (ref 130–400)
POTASSIUM SERPL-MCNC: 3.7 MMOL/L — SIGNIFICANT CHANGE UP (ref 3.5–5)
POTASSIUM SERPL-SCNC: 3.7 MMOL/L — SIGNIFICANT CHANGE UP (ref 3.5–5)
RBC # BLD: 5 M/UL — SIGNIFICANT CHANGE UP (ref 4.2–5.4)
RBC # FLD: 12.8 % — SIGNIFICANT CHANGE UP (ref 11.5–14.5)
SODIUM SERPL-SCNC: 144 MMOL/L — SIGNIFICANT CHANGE UP (ref 135–146)
SPECIMEN SOURCE: SIGNIFICANT CHANGE UP
WBC # BLD: 7.21 K/UL — SIGNIFICANT CHANGE UP (ref 4.8–10.8)
WBC # FLD AUTO: 7.21 K/UL — SIGNIFICANT CHANGE UP (ref 4.8–10.8)

## 2018-09-06 RX ORDER — CEFTRIAXONE 500 MG/1
1 INJECTION, POWDER, FOR SOLUTION INTRAMUSCULAR; INTRAVENOUS ONCE
Qty: 0 | Refills: 0 | Status: COMPLETED | OUTPATIENT
Start: 2018-09-06 | End: 2018-09-06

## 2018-09-06 RX ORDER — FUROSEMIDE 40 MG
40 TABLET ORAL EVERY 12 HOURS
Qty: 0 | Refills: 0 | Status: DISCONTINUED | OUTPATIENT
Start: 2018-09-06 | End: 2018-09-07

## 2018-09-06 RX ORDER — CEFTRIAXONE 500 MG/1
1 INJECTION, POWDER, FOR SOLUTION INTRAMUSCULAR; INTRAVENOUS EVERY 24 HOURS
Qty: 0 | Refills: 0 | Status: DISCONTINUED | OUTPATIENT
Start: 2018-09-07 | End: 2018-09-11

## 2018-09-06 RX ORDER — CEFTRIAXONE 500 MG/1
INJECTION, POWDER, FOR SOLUTION INTRAMUSCULAR; INTRAVENOUS
Qty: 0 | Refills: 0 | Status: DISCONTINUED | OUTPATIENT
Start: 2018-09-06 | End: 2018-09-11

## 2018-09-06 RX ADMIN — Medication 40 MILLIGRAM(S): at 05:47

## 2018-09-06 RX ADMIN — Medication 5 MILLIGRAM(S): at 05:47

## 2018-09-06 RX ADMIN — Medication 1200 MILLIGRAM(S): at 17:12

## 2018-09-06 RX ADMIN — PANTOPRAZOLE SODIUM 40 MILLIGRAM(S): 20 TABLET, DELAYED RELEASE ORAL at 05:47

## 2018-09-06 RX ADMIN — Medication 40 MILLIGRAM(S): at 17:12

## 2018-09-06 RX ADMIN — Medication 1200 MILLIGRAM(S): at 05:47

## 2018-09-06 RX ADMIN — ENOXAPARIN SODIUM 40 MILLIGRAM(S): 100 INJECTION SUBCUTANEOUS at 12:23

## 2018-09-06 RX ADMIN — CEFTRIAXONE 100 GRAM(S): 500 INJECTION, POWDER, FOR SOLUTION INTRAMUSCULAR; INTRAVENOUS at 17:11

## 2018-09-06 RX ADMIN — CHLORHEXIDINE GLUCONATE 1 APPLICATION(S): 213 SOLUTION TOPICAL at 05:48

## 2018-09-06 NOTE — CONSULT NOTE ADULT - SUBJECTIVE AND OBJECTIVE BOX
Patient is a 86y old  Female who presents with a chief complaint of AMS (05 Sep 2018 14:42)        PAST MEDICAL & SURGICAL HISTORY:  Immune thrombocytopenia  Artificial cardiac pacemaker      HPI:  Pt with hfref severe AS ITP nonoperable or for TAVR with change in mental status and CHF, pt has not required diuresis as outpt. pt now improved with no problems at this time and mental status improved per fmaily.               PREVIOUS DIAGNOSTIC TESTING:      ECHO  FINDINGS:    STRESS  FINDINGS:    CATHETERIZATION  FINDINGS:    ELECTROPHYSIOLOGY STUDY  FINDINGS:    CAROTID ULTRASOUND:  FINDINGS    VENOUS DUPLEX SCAN:  FINDINGS:    CHEST CT PULMONARY ANGIO with IV Contrast:  FINDINGS:  MEDICATIONS  (STANDING):  chlorhexidine 4% Liquid 1 Application(s) Topical <User Schedule>  cholestyramine Powder (Sugar-Free) 4 Gram(s) Oral daily  diphenoxylate/atropine 1 Tablet(s) Oral daily  enoxaparin Injectable 40 milliGRAM(s) SubCutaneous daily  furosemide   Injectable 40 milliGRAM(s) IV Push two times a day  mesalamine DR Capsule 1200 milliGRAM(s) Oral two times a day  pantoprazole    Tablet 40 milliGRAM(s) Oral before breakfast  predniSONE   Tablet 5 milliGRAM(s) Oral daily    MEDICATIONS  (PRN):      FAMILY HISTORY:  No pertinent family history in first degree relatives      SOCIAL HISTORY:    CIGARETTES:    ALCOHOL:    REVIEW OF SYSTEMS:      RESPIRATORY: SEE HPI   CARDIOVASCULAR: SEE HPI   GASTROINTESTINAL: No abdominal pain  NEUROLOGICAL: grossly intact motor and sensory   ENDOCRINE: No heat or cold intolerance, or hair loss  MUSCULOSKELETAL: No joint pain or swelling, muscle, back, or extremity pain  HEME/LYMPH: No easy bruising or bleeding gums      Vital Signs Last 24 Hrs  T(C): 36.2 (06 Sep 2018 06:56), Max: 36.6 (05 Sep 2018 20:45)  T(F): 97.2 (06 Sep 2018 06:56), Max: 97.8 (05 Sep 2018 20:45)  HR: 88 (06 Sep 2018 06:56) (78 - 88)  BP: 125/59 (06 Sep 2018 06:56) (125/59 - 156/69)  BP(mean): --  RR: 18 (06 Sep 2018 06:56) (18 - 18)  SpO2: --    PHYSICAL EXAM:    GENERAL: In no apparent distress, well nourished, and hydrated.  HEAD:  Atraumatic, Normocephalic  EYES: EOMI, PERRLA, conjunctiva and sclera clear  ENMT: No tonsillar erythema, exudates, or enlargement; Moist mucous membranes, Good dentition, No lesions  NECK: Supple and normal thyroid.  No JVD or carotid bruit.  Carotid pulse is 2+ bilaterally.  HEART: Regular rate and rhythm; No murmurs, rubs, or gallops.  PULMONARY: Clear to auscultation and perfusion.  No rales, wheezing, or rhonchi bilaterally.  ABDOMEN: Soft, Nontender, Nondistended; Bowel sounds present  EXTREMITIES:  2+ Peripheral Pulses, No clubbing, cyanosis, or edema  LYMPH: No lymphadenopathy noted  NEUROLOGICAL: Grossly nonfocal          INTERPRETATION OF TELEMETRY:    ECG:    I&O's Detail    05 Sep 2018 07:  -  06 Sep 2018 07:00  --------------------------------------------------------  IN:  Total IN: 0 mL    OUT:    Voided: 1000 mL  Total OUT: 1000 mL    Total NET: -1000 mL          LABS:                        13.9   7.21  )-----------( 239      ( 06 Sep 2018 10:18 )             43.7     -    144  |  96<L>  |  20  ----------------------------<  155<H>  3.7   |  29  |  0.9    Ca    8.6      06 Sep 2018 10:18  Mg     1.7     09-05              BNP  I&O's Detail    05 Sep 2018 07:  -  06 Sep 2018 07:00  --------------------------------------------------------  IN:  Total IN: 0 mL    OUT:    Voided: 1000 mL  Total OUT: 1000 mL    Total NET: -1000 mL        Daily Height in cm: 167.64 (05 Sep 2018 18:26)    Daily Weight in k.3 (06 Sep 2018 06:56)    RADIOLOGY & ADDITIONAL STUDIES:

## 2018-09-06 NOTE — PROGRESS NOTE ADULT - SUBJECTIVE AND OBJECTIVE BOX
SUBJECTIVE:    Patient is a 86y old Female who presents with a chief complaint of AMS (06 Sep 2018 13:40)    Currently admitted to medicine with the primary diagnosis of CHF (congestive heart failure)     Today is hospital day 2d. Denies CP, SOB on exertion, orthopnea, leg swelling, GI pain.    PAST MEDICAL & SURGICAL HISTORY  Immune thrombocytopenia  Artificial cardiac pacemaker    SOCIAL HISTORY:  Negative for smoking/alcohol/drug use.     ALLERGIES:  No Known Allergies    MEDICATIONS:  STANDING MEDICATIONS  chlorhexidine 4% Liquid 1 Application(s) Topical <User Schedule>  cholestyramine Powder (Sugar-Free) 4 Gram(s) Oral daily  diphenoxylate/atropine 1 Tablet(s) Oral daily  enoxaparin Injectable 40 milliGRAM(s) SubCutaneous daily  furosemide    Tablet 40 milliGRAM(s) Oral every 12 hours  mesalamine DR Capsule 1200 milliGRAM(s) Oral two times a day  pantoprazole    Tablet 40 milliGRAM(s) Oral before breakfast  predniSONE   Tablet 5 milliGRAM(s) Oral daily    PRN MEDICATIONS    VITALS:   T(F): 97.5  HR: 77  BP: 95/52  RR: 18  SpO2: --    LABS:                        13.9   7.21  )-----------( 239      ( 06 Sep 2018 10:18 )             43.7     09-06    144  |  96<L>  |  20  ----------------------------<  155<H>  3.7   |  29  |  0.9    Ca    8.6      06 Sep 2018 10:18  Mg     1.7     09-05                Culture - Blood (collected 05 Sep 2018 00:38)  Source: .Blood None  Preliminary Report (06 Sep 2018 06:01):    No growth to date.    Culture - Urine (collected 04 Sep 2018 13:33)  Source: .Urine Clean Catch (Midstream)  Preliminary Report (05 Sep 2018 18:26):    >100,000 CFU/ml Escherichia coli          RADIOLOGY:    PHYSICAL EXAM:  GENERAL: NAD, speaks in full sentences, no signs of respiratory distress  HEAD:  Atraumatic, Normocephalic  EYES: EOMI, PERRLA, conjunctiva and sclera clear  NECK: Supple, No JVD  CHEST/LUNG: CTAB; No wheeze; No crackles; No accessory muscles used  HEART: Regular rate and rhythm; No murmurs;   ABDOMEN: Soft, Nontender, Nondistended; Bowel sounds present; No guarding  EXTREMITIES:  2+ Peripheral Pulses, No cyanosis or edema  PSYCH: AAOx3  NEUROLOGY: non-focal  SKIN: No rashes or lesions    Intravenous access:   NG tube:   Tapia Catheter: SUBJECTIVE:    Patient is a 86y old Female who presents with a chief complaint of AMS (06 Sep 2018 13:40)    Currently admitted to medicine with the primary diagnosis of CHF (congestive heart failure)     Today is hospital day 2d. Denies CP, SOB on exertion, orthopnea,  GI pain. stable leg swelling,    PAST MEDICAL & SURGICAL HISTORY  Immune thrombocytopenia  Artificial cardiac pacemaker    SOCIAL HISTORY:  Negative for smoking/alcohol/drug use.     ALLERGIES:  No Known Allergies    MEDICATIONS:  STANDING MEDICATIONS  chlorhexidine 4% Liquid 1 Application(s) Topical <User Schedule>  cholestyramine Powder (Sugar-Free) 4 Gram(s) Oral daily  diphenoxylate/atropine 1 Tablet(s) Oral daily  enoxaparin Injectable 40 milliGRAM(s) SubCutaneous daily  furosemide    Tablet 40 milliGRAM(s) Oral every 12 hours  mesalamine DR Capsule 1200 milliGRAM(s) Oral two times a day  pantoprazole    Tablet 40 milliGRAM(s) Oral before breakfast  predniSONE   Tablet 5 milliGRAM(s) Oral daily    PRN MEDICATIONS    VITALS:   T(F): 97.5  HR: 77  BP: 95/52  RR: 18  SpO2: --    LABS:                        13.9   7.21  )-----------( 239      ( 06 Sep 2018 10:18 )             43.7     09-06    144  |  96<L>  |  20  ----------------------------<  155<H>  3.7   |  29  |  0.9    Ca    8.6      06 Sep 2018 10:18  Mg     1.7     09-05                Culture - Blood (collected 05 Sep 2018 00:38)  Source: .Blood None  Preliminary Report (06 Sep 2018 06:01):    No growth to date.    Culture - Urine (collected 04 Sep 2018 13:33)  Source: .Urine Clean Catch (Midstream)  Preliminary Report (05 Sep 2018 18:26):    >100,000 CFU/ml Escherichia coli          RADIOLOGY:  < from: Transthoracic Echocardiogram (09.06.18 @ 12:21) >  1. Severely decreased global left ventricular systolic function.   2. Moderate to severe left atrial enlargement.   3. LV Ejection Fraction by Simmons's Method with a biplane EF of 29 %.   4. Spectral Doppler shows impaired relaxation pattern of left   ventricular myocardial filling (Grade I diastolic dysfunction).   5. Moderate pleural effusion in the left lateral region.   6. Moderate mitral valve regurgitation.   7. Mild-moderate tricuspid regurgitation.   8. Mild aortic regurgitation.   9. Peak transaortic gradient is 66.2 mmHg, mean transaortic gradient   equals 30.8 mmHg, the calculated aortic valve area equals 0.62 cm² by the   continuity equation consistent with severe aortic stenosis.      < end of copied text >    < from: US Abdomen Limited (09.05.18 @ 21:58) >    LIVER:  Normal in contour and echogenicity measuring 15.7 cm in length.   No focal mass is seen.    GALLBLADDER/BILIARY TREE: Status post cholecystectomy. No intrahepatic   biliary ductal dilatation. The common bile duct measures 7.6 mm, which is   normal.     PANCREAS:  Visualized head and body are unremarkable. Remainder obscured   by overlying bowel gas.    KIDNEY:  Right kidney measures 9.9 cm in length. There is mild right   hydronephrosis. No calculi or solid mass.    AORTA/IVC:  Visualized proximal portions unremarkable.    ASCITES:  None. There is a right pleural effusion.      IMPRESSION:    Status post cholecystectomy.    Mild right hydronephrosis.    Right pleural effusion.    < end of copied text >    PHYSICAL EXAM:  GENERAL: NAD, speaks in full sentences, no signs of respiratory distress  HEAD:  Atraumatic, Normocephalic  EYES: EOMI, PERRLA, conjunctiva and sclera clear  NECK: Supple, No JVD  CHEST/LUNG: CTAB; No wheeze; No crackles; No accessory muscles used  HEART: Regular rate and rhythm; No murmurs;   ABDOMEN: Soft, Nontender, Nondistended; Bowel sounds present; No guarding  EXTREMITIES:  2+ Peripheral Pulses, No cyanosis. 1+ b/l pitting edema  PSYCH: AAOx3  NEUROLOGY: non-focal  SKIN: No rashes or lesions    Intravenous access:   NG tube:   Tapia Catheter:

## 2018-09-06 NOTE — CONSULT NOTE ADULT - SUBJECTIVE AND OBJECTIVE BOX
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 As per family, this has been recurring for the past 2-3 weeks where the patient has been waking in a state of delirium and screaming out in distress and the episodes usually resolve after a few minutes. However, today this episode was much more prolonged, lasting almost an hour, so the family brought her to the ED for further evaluation. Of note, the patient was worked up by her PMD for possible neurologic etiology and it has been negative thus far. She has not had a chance to see her neurologist yet. Denies cp, palpitations, abd pain, n/v/d/c, numbness, paresthesia, recent travel, sick contacts. Admits to SOB, ELIZABETH, RAYA, orthopnea.    In ED, T 96.7F HR 88 bp 147/62 90% on RA. CTHNC was done due to AMS which came back unremarkable. Patient found to be in CHF and required BiPAP which made her feel better with improvement of saturation and was given lasix 40mg IV x1. BNP on admission was [id number]. (04 Sep 2018 20:30)      PAST MEDICAL & SURGICAL HISTORY:  Immune thrombocytopenia  Artificial cardiac pacemaker      Hospital Course:    TODAY'S SUBJECTIVE & REVIEW OF SYMPTOMS:     Constitutional WNL   Cardio WNL   Resp WNL   GI WNL  Heme WNL  Endo WNL  Skin WNL  MSK WNL  Neuro weakness  Cognitive WNL  Psych WNL      MEDICATIONS  (STANDING):  chlorhexidine 4% Liquid 1 Application(s) Topical <User Schedule>  cholestyramine Powder (Sugar-Free) 4 Gram(s) Oral daily  diphenoxylate/atropine 1 Tablet(s) Oral daily  enoxaparin Injectable 40 milliGRAM(s) SubCutaneous daily  furosemide    Tablet 40 milliGRAM(s) Oral every 12 hours  mesalamine DR Capsule 1200 milliGRAM(s) Oral two times a day  pantoprazole    Tablet 40 milliGRAM(s) Oral before breakfast  predniSONE   Tablet 5 milliGRAM(s) Oral daily    MEDICATIONS  (PRN):      FAMILY HISTORY:  No pertinent family history in first degree relatives      Allergies    No Known Allergies    Intolerances        SOCIAL HISTORY:    [  ] Etoh  [  ] Smoking  [  ] Substance abuse     Home Environment:  [  ] Home Alone  [x  ] Lives with Family  [  ] Home Health Aid    Dwelling:  [  ] Apartment  [x  ] Private House  [  ] Adult Home  [  ] Skilled Nursing Facility      [  ] Short Term  [  ] Long Term  [x  ] Stairs       Elevator [  ]    FUNCTIONAL STATUS PTA: (Check all that apply)  Ambulation: [ x  ]Independent    [  ] Dependent     [  ] Non-Ambulatory  Assistive Device: [  ] SA Cane  [  ]  Q Cane  [  ] Walker  [  ]  Wheelchair  ADL : [x  ] Independent  [  ]  Dependent       Vital Signs Last 24 Hrs  T(C): 36.4 (06 Sep 2018 14:27), Max: 36.6 (05 Sep 2018 20:45)  T(F): 97.5 (06 Sep 2018 14:27), Max: 97.8 (05 Sep 2018 20:45)  HR: 77 (06 Sep 2018 14:27) (77 - 88)  BP: 95/52 (06 Sep 2018 14:27) (95/52 - 156/69)  BP(mean): --  RR: 18 (06 Sep 2018 14:27) (18 - 18)  SpO2: --      PHYSICAL EXAM: Alert & Oriented X3  GENERAL: NAD, well-groomed, well-developed  HEAD:  Atraumatic, Normocephalic  CHEST/LUNG: Clear  HEART: Regular  ABDOMEN: Soft, Nontender  EXTREMITIES:  no calf tenderness    NERVOUS SYSTEM:  Cranial Nerves 2-12 intact [  ] Abnormal  [  ]  ROM: WFL all extremities [  ]  Abnormal [x  ]limited rue  Motor Strength: WFL all extremities  [  ]  Abnormal [ x ]limited rue  Sensation: intact to light touch [x  ] Abnormal [  ]  Reflexes: Symmetric [  ]  Abnormal [  ]    FUNCTIONAL STATUS:  Bed Mobility: Independent [  ]  Supervision [  ]  Needs Assistance [x  ]  N/A [  ]  Transfers: Independent [  ]  Supervision [  ]  Needs Assistance [x  ]  N/A [  ]   Ambulation: Independent [  ]  Supervision [  ]  Needs Assistance [  ]  N/A [  ]  ADL: Independent [  ] Requires Assistance [  ] N/A [  ]      LABS:                        13.9   7.21  )-----------( 239      ( 06 Sep 2018 10:18 )             43.7     09-06    144  |  96<L>  |  20  ----------------------------<  155<H>  3.7   |  29  |  0.9    Ca    8.6      06 Sep 2018 10:18  Mg     1.7     09-05            RADIOLOGY & ADDITIONAL STUDIES:    Assesment:

## 2018-09-07 ENCOUNTER — TRANSCRIPTION ENCOUNTER (OUTPATIENT)
Age: 83
End: 2018-09-07

## 2018-09-07 LAB
ALBUMIN SERPL ELPH-MCNC: 3.9 G/DL — SIGNIFICANT CHANGE UP (ref 3.5–5.2)
ALP SERPL-CCNC: 98 U/L — SIGNIFICANT CHANGE UP (ref 30–115)
ALT FLD-CCNC: 32 U/L — SIGNIFICANT CHANGE UP (ref 0–41)
ANION GAP SERPL CALC-SCNC: 24 MMOL/L — HIGH (ref 7–14)
AST SERPL-CCNC: 15 U/L — SIGNIFICANT CHANGE UP (ref 0–41)
BASOPHILS # BLD AUTO: 0.02 K/UL — SIGNIFICANT CHANGE UP (ref 0–0.2)
BASOPHILS NFR BLD AUTO: 0.3 % — SIGNIFICANT CHANGE UP (ref 0–1)
BILIRUB DIRECT SERPL-MCNC: 0.2 MG/DL — SIGNIFICANT CHANGE UP (ref 0–0.2)
BILIRUB INDIRECT FLD-MCNC: 0.3 MG/DL — SIGNIFICANT CHANGE UP (ref 0.2–1.2)
BILIRUB SERPL-MCNC: 0.5 MG/DL — SIGNIFICANT CHANGE UP (ref 0.2–1.2)
BUN SERPL-MCNC: 26 MG/DL — HIGH (ref 10–20)
CALCIUM SERPL-MCNC: 8.5 MG/DL — SIGNIFICANT CHANGE UP (ref 8.5–10.1)
CHLORIDE SERPL-SCNC: 98 MMOL/L — SIGNIFICANT CHANGE UP (ref 98–110)
CO2 SERPL-SCNC: 23 MMOL/L — SIGNIFICANT CHANGE UP (ref 17–32)
CREAT SERPL-MCNC: 1 MG/DL — SIGNIFICANT CHANGE UP (ref 0.7–1.5)
EOSINOPHIL # BLD AUTO: 0.1 K/UL — SIGNIFICANT CHANGE UP (ref 0–0.7)
EOSINOPHIL NFR BLD AUTO: 1.5 % — SIGNIFICANT CHANGE UP (ref 0–8)
GLUCOSE SERPL-MCNC: 209 MG/DL — HIGH (ref 70–99)
HCT VFR BLD CALC: 42.3 % — SIGNIFICANT CHANGE UP (ref 37–47)
HGB BLD-MCNC: 13.4 G/DL — SIGNIFICANT CHANGE UP (ref 12–16)
IMM GRANULOCYTES NFR BLD AUTO: 0.6 % — HIGH (ref 0.1–0.3)
LYMPHOCYTES # BLD AUTO: 0.81 K/UL — LOW (ref 1.2–3.4)
LYMPHOCYTES # BLD AUTO: 12.1 % — LOW (ref 20.5–51.1)
MAGNESIUM SERPL-MCNC: 1.5 MG/DL — LOW (ref 1.8–2.4)
MCHC RBC-ENTMCNC: 28.2 PG — SIGNIFICANT CHANGE UP (ref 27–31)
MCHC RBC-ENTMCNC: 31.7 G/DL — LOW (ref 32–37)
MCV RBC AUTO: 88.9 FL — SIGNIFICANT CHANGE UP (ref 81–99)
MONOCYTES # BLD AUTO: 0.41 K/UL — SIGNIFICANT CHANGE UP (ref 0.1–0.6)
MONOCYTES NFR BLD AUTO: 6.1 % — SIGNIFICANT CHANGE UP (ref 1.7–9.3)
NEUTROPHILS # BLD AUTO: 5.31 K/UL — SIGNIFICANT CHANGE UP (ref 1.4–6.5)
NEUTROPHILS NFR BLD AUTO: 79.4 % — HIGH (ref 42.2–75.2)
NRBC # BLD: 0 /100 WBCS — SIGNIFICANT CHANGE UP (ref 0–0)
PLATELET # BLD AUTO: 241 K/UL — SIGNIFICANT CHANGE UP (ref 130–400)
POTASSIUM SERPL-MCNC: 3.5 MMOL/L — SIGNIFICANT CHANGE UP (ref 3.5–5)
POTASSIUM SERPL-SCNC: 3.5 MMOL/L — SIGNIFICANT CHANGE UP (ref 3.5–5)
PROT SERPL-MCNC: 5.5 G/DL — LOW (ref 6–8)
RBC # BLD: 4.76 M/UL — SIGNIFICANT CHANGE UP (ref 4.2–5.4)
RBC # FLD: 12.7 % — SIGNIFICANT CHANGE UP (ref 11.5–14.5)
SODIUM SERPL-SCNC: 145 MMOL/L — SIGNIFICANT CHANGE UP (ref 135–146)
WBC # BLD: 6.69 K/UL — SIGNIFICANT CHANGE UP (ref 4.8–10.8)
WBC # FLD AUTO: 6.69 K/UL — SIGNIFICANT CHANGE UP (ref 4.8–10.8)

## 2018-09-07 RX ORDER — METOPROLOL TARTRATE 50 MG
25 TABLET ORAL DAILY
Qty: 0 | Refills: 0 | Status: DISCONTINUED | OUTPATIENT
Start: 2018-09-07 | End: 2018-09-14

## 2018-09-07 RX ORDER — MAGNESIUM OXIDE 400 MG ORAL TABLET 241.3 MG
400 TABLET ORAL
Qty: 0 | Refills: 0 | Status: COMPLETED | OUTPATIENT
Start: 2018-09-07 | End: 2018-09-08

## 2018-09-07 RX ORDER — MAGNESIUM SULFATE 500 MG/ML
2 VIAL (ML) INJECTION ONCE
Qty: 0 | Refills: 0 | Status: COMPLETED | OUTPATIENT
Start: 2018-09-07 | End: 2018-09-07

## 2018-09-07 RX ORDER — FUROSEMIDE 40 MG
40 TABLET ORAL DAILY
Qty: 0 | Refills: 0 | Status: DISCONTINUED | OUTPATIENT
Start: 2018-09-07 | End: 2018-09-14

## 2018-09-07 RX ADMIN — Medication 5 MILLIGRAM(S): at 06:25

## 2018-09-07 RX ADMIN — CHLORHEXIDINE GLUCONATE 1 APPLICATION(S): 213 SOLUTION TOPICAL at 06:26

## 2018-09-07 RX ADMIN — CEFTRIAXONE 100 GRAM(S): 500 INJECTION, POWDER, FOR SOLUTION INTRAMUSCULAR; INTRAVENOUS at 17:09

## 2018-09-07 RX ADMIN — MAGNESIUM OXIDE 400 MG ORAL TABLET 400 MILLIGRAM(S): 241.3 TABLET ORAL at 17:02

## 2018-09-07 RX ADMIN — Medication 1200 MILLIGRAM(S): at 17:02

## 2018-09-07 RX ADMIN — Medication 1200 MILLIGRAM(S): at 06:25

## 2018-09-07 RX ADMIN — CHOLESTYRAMINE 4 GRAM(S): 4 POWDER, FOR SUSPENSION ORAL at 08:15

## 2018-09-07 RX ADMIN — ENOXAPARIN SODIUM 40 MILLIGRAM(S): 100 INJECTION SUBCUTANEOUS at 11:35

## 2018-09-07 RX ADMIN — Medication 40 MILLIGRAM(S): at 06:25

## 2018-09-07 RX ADMIN — Medication 25 GRAM(S): at 17:36

## 2018-09-07 RX ADMIN — PANTOPRAZOLE SODIUM 40 MILLIGRAM(S): 20 TABLET, DELAYED RELEASE ORAL at 06:25

## 2018-09-07 NOTE — PROGRESS NOTE ADULT - SUBJECTIVE AND OBJECTIVE BOX
SHELLEY PETIT  86y Female    CHIEF COMPLAINT:    Patient is a 86y old  Female who presents with a chief complaint of AMS and acute on chronic heart failure (07 Sep 2018 13:18)      INTERVAL HPI/OVERNIGHT EVENTS:    Patient seen and examined. Confused. No sob. PPM battery is down. Needs to be replaced. No fever or chills. No urinary complaints.     ROS: All other systems are negative.    Vital Signs:    T(F): 96.5 (18 @ 14:32), Max: 96.6 (18 @ 20:46)  HR: 82 (18 @ 14:32) (82 - 87)  BP: 99/52 (18 @ 14:32) (99/52 - 123/58)  RR: 18 (18 @ 14:32) (18 - 18)  SpO2: 92% (18 @ 20:03) (92% - 92%)  I&O's Summary    06 Sep 2018 07:01  -  07 Sep 2018 07:00  --------------------------------------------------------  IN: 0 mL / OUT: 1500 mL / NET: -1500 mL    07 Sep 2018 07:01  -  07 Sep 2018 16:27  --------------------------------------------------------  IN: 0 mL / OUT: 400 mL / NET: -400 mL      Daily     Daily Weight in k (07 Sep 2018 12:16)  CAPILLARY BLOOD GLUCOSE          PHYSICAL EXAM:    GENERAL:  NAD  SKIN: No rashes or lesions  HENT: Atrumatic. Normocephalic. PERRL. Moist membranes.  NECK: Supple, No JVD. No lymphadenopathy.  PULMONARY: CTA B/L. No wheezing. No rales  CVS: Normal S1, S2. Rate and Rythm are regular. A IV/VI syst mm over mitral and aortic areas.  ABDOMEN/GI: Soft, Nontender, Nondistended; BS present  EXTREMITIES: Peripheral pulses intact. No edema B/L LE.  NEUROLOGIC:  No motor or sensory deficit.  PSYCH: Alert & oriented x 0    Consultant(s) Notes Reviewed:  [x ] YES  [ ] NO  Care Discussed with Consultants/Other Providers [ x] YES  [ ] NO    EKG reviewed  Telemetry reviewed    LABS:                        13.4   6.69  )-----------( 241      ( 07 Sep 2018 09:07 )             42.3     -    145  |  98  |  26<H>  ----------------------------<  209<H>  3.5   |  23  |  1.0    Ca    8.5      07 Sep 2018 09:07  Mg     1.5     -    TPro  5.5<L>  /  Alb  3.9  /  TBili  0.5  /  DBili  0.2  /  AST  15  /  ALT  32  /  AlkPhos  98  -07      Serum Pro-Brain Natriuretic Peptide: 50571 pg/mL (18 @ 13:33)        Culture - Blood (collected 05 Sep 2018 00:38)  Source: .Blood None  Preliminary Report (06 Sep 2018 06:01):    No growth to date.        RADIOLOGY & ADDITIONAL TESTS:    < from: Transthoracic Echocardiogram (18 @ 12:21) >    Summary:   1. Severely decreased global left ventricular systolic function.   2. Moderate to severe left atrial enlargement.   3. LV Ejection Fraction by Simmons's Method with a biplane EF of 29 %.   4. Spectral Doppler shows impaired relaxation pattern of left   ventricular myocardial filling (Grade I diastolic dysfunction).   5. Moderate pleural effusion in the left lateral region.   6. Moderate mitral valve regurgitation.   7. Mild-moderate tricuspid regurgitation.   8. Mild aortic regurgitation.   9. Peak transaortic gradient is 66.2 mmHg, mean transaortic gradient   equals 30.8 mmHg, the calculated aortic valve area equals 0.62 cm² by the   continuity equation consistent with severe aortic stenosis.    < end of copied text >    Imaging or report Personally Reviewed:  [ ] YES  [ ] NO    Medications:  Standing  cefTRIAXone   IVPB      cefTRIAXone   IVPB 1 Gram(s) IV Intermittent every 24 hours  chlorhexidine 4% Liquid 1 Application(s) Topical <User Schedule>  cholestyramine Powder (Sugar-Free) 4 Gram(s) Oral daily  diphenoxylate/atropine 1 Tablet(s) Oral daily  enoxaparin Injectable 40 milliGRAM(s) SubCutaneous daily  furosemide    Tablet 40 milliGRAM(s) Oral daily  magnesium oxide 400 milliGRAM(s) Oral three times a day with meals  magnesium sulfate  IVPB 2 Gram(s) IV Intermittent once  mesalamine DR Capsule 1200 milliGRAM(s) Oral two times a day  pantoprazole    Tablet 40 milliGRAM(s) Oral before breakfast  predniSONE   Tablet 5 milliGRAM(s) Oral daily    PRN Meds      Case discussed with resident    Care discussed with pt/family

## 2018-09-07 NOTE — DISCHARGE NOTE ADULT - CARE PLAN
Principal Discharge DX:	Other congestive heart failure  Goal:	Managing symptoms  Assessment and plan of treatment:	fond to have severe AS, not a candidate for TAVR or surgery. Will manage CFH medically, follow-up with Dr. Mendoza as OP. in 3 days. Had battery replaced Principal Discharge DX:	Other congestive heart failure  Goal:	Managing symptoms  Assessment and plan of treatment:	fond to have severe AS, not a candidate for TAVR or surgery. Will manage CFH medically, follow-up with Dr. Mendoza as OP. in 3 days. Had battery replaced  Secondary Diagnosis:	Right lower lobe pulmonary nodule  Goal:	Incidental finding on CXR  Assessment and plan of treatment:	follow up with PCP

## 2018-09-07 NOTE — DISCHARGE NOTE ADULT - MEDICATION SUMMARY - MEDICATIONS TO TAKE
I will START or STAY ON the medications listed below when I get home from the hospital:    Apriso  -- 1.2 gram(s) by mouth 2 times a day  -- Indication: For Aortic stenosis, severe    predniSONE 5 mg oral tablet  -- 1 tab(s) by mouth once a day  -- Indication: For Immune thrombocytopenia    losartan  -- 12.5 milligram(s) by mouth once a day  -- Indication: For CHF    Lomotil 2.5 mg-0.025 mg oral tablet  -- 2 tab(s) by mouth once a day  -- Indication: For CHF    cholestyramine 4 g/5 g oral powder for reconstitution  -- 1 dose(s) by mouth once a day  -- Indication: For CHF    metoprolol succinate 25 mg oral tablet, extended release  -- 1 tab(s) by mouth once a day  -- Indication: For CHF    furosemide 40 mg oral tablet  -- 1 tab(s) by mouth once a day  -- Indication: For CHF I will START or STAY ON the medications listed below when I get home from the hospital:    Apriso  -- 1.2 gram(s) by mouth 2 times a day  -- Indication: For Aortic stenosis, severe    predniSONE 5 mg oral tablet  -- 1 tab(s) by mouth once a day  -- Indication: For Immune thrombocytopenia    losartan  -- 12.5 milligram(s) by mouth once a day  -- Indication: For CHF    Lomotil 2.5 mg-0.025 mg oral tablet  -- 2 tab(s) by mouth once a day  -- Indication: For CHF    cholestyramine 4 g/5 g oral powder for reconstitution  -- 1 dose(s) by mouth once a day  -- Indication: For CHF    metoprolol succinate 25 mg oral tablet, extended release  -- 1 tab(s) by mouth once a day  -- Indication: For CHF    Keflex 500 mg oral capsule  -- 1 cap(s) by mouth 2 times a day   -- Finish all this medication unless otherwise directed by prescriber.    -- Indication: For Other congestive heart failure    furosemide 40 mg oral tablet  -- 1 tab(s) by mouth once a day  -- Indication: For CHF

## 2018-09-07 NOTE — PROGRESS NOTE ADULT - ASSESSMENT
86 year old lady with PMHx ITP, diverticulitis, PPM presents after an episode of AMS a/w SOB, RAYA, orthopnea. As per family, this has been recurring for the past 2-3 weeks where the patient has been waking in a state of delirium and screaming out in distress and the episodes usually resolve after a few minutes    1.	Ac. HFrEF/CM  2.	Toxic/Metabolic Encephalopathy  3.	UTI  4.	S/P PPM  5.	H/O ITP  6.	B/L Pleural effusion  7.	PPM malfunction  8.	Severe AS/Mod Mitral regurgitation         PLAN:    ·	Care D/W the card and pt's family in detail. Switch to lasix 40 mg po q 24h as per cardiology  ·	Will add metoprolol 25 mg po q 12h. If BP is stable will add ACE-I tomorrow.  ·	Cont IV Rocephin  ·	EP will replace the battery next week  ·	As per card pt is not a candidate for surgery or TAVR.  ·	Plan of care D/W the family in detail on bedside.

## 2018-09-07 NOTE — DISCHARGE NOTE ADULT - CARE PROVIDER_API CALL
Adele Mendoza (MD), Cardiovascular Disease  [address] [place] [address]  Phone: [phone]  Fax: [phone]

## 2018-09-07 NOTE — PROGRESS NOTE ADULT - ASSESSMENT
aicd   acute shf on chronic shf   hfref   change in mental status now baseline   htn   sob   severe AS   ITP in past     change lasix to 40 qd   awaiting resolution UTI prior to AICD battery replacement   will take care of AS as outpt   start metoprolol er 25 mg po qd can give as long as systolic greater than 90   f/u with dr. chavarria one week after D/C.

## 2018-09-07 NOTE — DISCHARGE NOTE ADULT - PATIENT PORTAL LINK FT
You can access the [facility] Patient Portal, offered by [facility], by registering with the following website: [email or web address]

## 2018-09-07 NOTE — CONSULT NOTE ADULT - SUBJECTIVE AND OBJECTIVE BOX
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 As per family, this has been recurring for the past 2-3 weeks where the patient has been waking in a state of delirium and screaming out in distress and the episodes usually resolve after a few minutes. However, today this episode was much more prolonged, lasting almost an hour, so the family brought her to the ED for further evaluation. Of note, the patient was worked up by her PMD for possible neurologic etiology and it has been negative thus far. She has not had a chance to see her neurologist yet. Denies cp, palpitations, abd pain, n/v/d/c, numbness, paresthesia, recent travel, sick contacts. Admits to SOB, ELIZABETH, RAYA, orthopnea.    In ED, T 96.7F HR 88 bp 147/62 90% on RA. CTHNC was done due to AMS which came back unremarkable. Patient found to be in CHF and required BiPAP which made her feel better with improvement of saturation and was given lasix 40mg IV x1. BNP on admission was 47063. (04 Sep 2018 20:30)    Patient is a 86y old  Female who presents with a chief complaint of AMS (07 Sep 2018 12:44)    85 y/o female with PMHx as indicated admitted with Acute on Chronic CHF exacerbation, AMS and UTI.  Device  interrogation shows patient non dependent, battery at EOL, no events and parameters in appropriate range.      PAST MEDICAL & SURGICAL HISTORY:  Immune thrombocytopenia  Artificial cardiac pacemaker    PREVIOUS DIAGNOSTIC TESTING:      ECHO: < from: Transthoracic Echocardiogram (18 @ 12:21) >  EXAM:  2-D ECHO (TTE) COMPLETE    PROCEDURE DATE:  2018    INTERPRETATION:  REPORT:    TRANSTHORACIC ECHOCARDIOGRAM REPORT   Summary:   1. Severely decreased global left ventricular systolic function.   2. Moderate to severe left atrial enlargement.   3. LV Ejection Fraction by Simmons's Method with a biplane EF of 29 %.   4. Spectral Doppler shows impaired relaxation pattern of left   ventricular myocardial filling (Grade I diastolic dysfunction).   5. Moderate pleural effusion in the left lateral region.   6. Moderate mitral valve regurgitation.   7. Mild-moderate tricuspid regurgitation.   8. Mild aortic regurgitation.   9. Peak transaortic gradient is 66.2 mmHg, mean transaortic gradient   equals 30.8 mmHg, the calculated aortic valve area equals 0.62 cm² by the   continuity equation consistent with severe aortic stenosis.  < end of copied text >      EXAM:  XR CHEST PORTABLE ROUTINE 1V        PROCEDURE DATE:  2018    INTERPRETATION:  CLINICAL HISTORY: CHF.  COMPARISON: Chest radiograph dated 2018.  IMPRESSION:    Stable pulmonary vascular congestion. Slightly decreased left pleural   effusion.      EXAM:  CT BRAIN        PROCEDURE DATE:  2018    INTERPRETATION:  Clinical History / Reason for exam: Altered mental   status, weakness. Recurring delirious episodes per family.  IMPRESSION:  No CT evidence for acute intracranial pathology.      MEDICATIONS  (STANDING):  cefTRIAXone   IVPB      cefTRIAXone   IVPB 1 Gram(s) IV Intermittent every 24 hours  chlorhexidine 4% Liquid 1 Application(s) Topical <User Schedule>  cholestyramine Powder (Sugar-Free) 4 Gram(s) Oral daily  diphenoxylate/atropine 1 Tablet(s) Oral daily  enoxaparin Injectable 40 milliGRAM(s) SubCutaneous daily  furosemide    Tablet 40 milliGRAM(s) Oral daily  mesalamine DR Capsule 1200 milliGRAM(s) Oral two times a day  pantoprazole    Tablet 40 milliGRAM(s) Oral before breakfast  predniSONE   Tablet 5 milliGRAM(s) Oral daily    MEDICATIONS  (PRN):    Home Medications:  Apriso: 1.2 gram(s) orally 2 times a day (04 Sep 2018 21:29)  cholestyramine 4 g/5 g oral powder for reconstitution: 1 dose(s) orally once a day (04 Sep 2018 21:29)  Lomotil 2.5 mg-0.025 mg oral tablet: 2 tab(s) orally once a day (04 Sep 2018 21:29)  predniSONE 5 mg oral tablet: 1 tab(s) orally once a day (04 Sep 2018 21:29)    FAMILY HISTORY:  No pertinent family history in first degree relatives    SOCIAL HISTORY:  CIGARETTES:  ALCOHOL:    Vital Signs Last 24 Hrs  T(C): 35.7 (07 Sep 2018 05:49), Max: 36.4 (06 Sep 2018 14:27)  T(F): 96.2 (07 Sep 2018 05:49), Max: 97.5 (06 Sep 2018 14:27)  HR: 84 (07 Sep 2018 05:49) (77 - 87)  BP: 105/73 (07 Sep 2018 05:49) (95/52 - 123/58)  RR: 18 (07 Sep 2018 05:49) (18 - 18)  SpO2: 92% (06 Sep 2018 20:03) (92% - 92%)    INTERPRETATION OF TELEMETRY:    ECG: < from: 12 Lead ECG (18 @ 11:37) >  Ventricular Rate 93 BPM  Atrial Rate 93 BPM  P-R Interval 180 ms  QRS Duration 84 ms  Q-T Interval 350 ms  QTC Calculation(Bezet) 435 ms  P Axis 77 degrees  R Axis 83 degrees  T Axis 264 degrees  Diagnosis Line Normal sinus rhythm  ST & T wave abnormality, consider inferolateral ischemia  Abnormal ECG  < end of copied text >    LABS:                      13.4   6.69  )-----------( 241      ( 07 Sep 2018 09:07 )             42.3     09-07    145  |  98  |  26<H>  ----------------------------<  209<H>  3.5   |  23  |  1.0    Ca    8.5      07 Sep 2018 09:07  Mg     1.5     07    TPro  5.5<L>  /  Alb  3.9  /  TBili  0.5  /  DBili  0.2  /  AST  15  /  ALT  32  /  AlkPhos  98  09-07    LIVER FUNCTIONS - ( 07 Sep 2018 09:07 )  Alb: 3.9 g/dL / Pro: 5.5 g/dL / ALK PHOS: 98 U/L / ALT: 32 U/L / AST: 15 U/L / GGT: x           I&O's Detail  06 Sep 2018 07:  -  07 Sep 2018 07:00  --------------------------------------------------------  IN:  Total IN: 0 mL  OUT:    Voided: 1500 mL  Total OUT: 1500 mL  Total NET: -1500 mL  07 Sep 2018 07:  -  07 Sep 2018 13:25  --------------------------------------------------------  IN:  Total IN: 0 mL  OUT:    Voided: 300 mL  Total OUT: 300 mL  Total NET: -300 mL  Daily Weight in k (07 Sep 2018 12:16) 86 year old lady with history of ITP, diverticulitis, SSS s/p PPM () presents after an episode of AMS in the setting of a UTI as well as SOB, RAYA, orthopnea. As per family, this has been recurring for the past 2-3 weeks where the patient has been waking in a state of delirium and screaming out in distress and the episodes usually resolve after a few minutes. However, today this episode was much more prolonged, lasting almost an hour, so the family brought her to the ED for further evaluation. Of note, the patient was worked up by her PMD for possible neurologic etiology and it has been negative thus far. She has not had a chance to see her neurologist yet. Denies cp, palpitations, abd pain, n/v/d/c, numbness, paresthesia, recent travel, sick contacts. Admits to SOB, ELIZABETH, RAYA, orthopnea.    In ED, T 96.7F HR 88 bp 147/62 90% on RA. CTHNC was done due to AMS which came back unremarkable. Patient found to be in CHF and required BiPAP which made her feel better with improvement of saturation and was given lasix 40mg IV x1. BNP on admission was 37518.      Device  interrogation this morning shows patient is non dependent, but the battery is at EOL, no events and parameters in appropriate range.      PAST MEDICAL & SURGICAL HISTORY:  Immune thrombocytopenia  Artificial cardiac pacemaker    PREVIOUS DIAGNOSTIC TESTING:      ECHO: < from: Transthoracic Echocardiogram (18 @ 12:21) >  EXAM:  2-D ECHO (TTE) COMPLETE    PROCEDURE DATE:  2018    INTERPRETATION:  REPORT:    TRANSTHORACIC ECHOCARDIOGRAM REPORT   Summary:   1. Severely decreased global left ventricular systolic function.   2. Moderate to severe left atrial enlargement.   3. LV Ejection Fraction by Simmons's Method with a biplane EF of 29 %.   4. Spectral Doppler shows impaired relaxation pattern of left   ventricular myocardial filling (Grade I diastolic dysfunction).   5. Moderate pleural effusion in the left lateral region.   6. Moderate mitral valve regurgitation.   7. Mild-moderate tricuspid regurgitation.   8. Mild aortic regurgitation.   9. Peak transaortic gradient is 66.2 mmHg, mean transaortic gradient   equals 30.8 mmHg, the calculated aortic valve area equals 0.62 cm² by the   continuity equation consistent with severe aortic stenosis.  < end of copied text >      EXAM:  XR CHEST PORTABLE ROUTINE 1V        PROCEDURE DATE:  2018    INTERPRETATION:  CLINICAL HISTORY: CHF.  COMPARISON: Chest radiograph dated 2018.  IMPRESSION:    Stable pulmonary vascular congestion. Slightly decreased left pleural   effusion.      EXAM:  CT BRAIN        PROCEDURE DATE:  2018    INTERPRETATION:  Clinical History / Reason for exam: Altered mental   status, weakness. Recurring delirious episodes per family.  IMPRESSION:  No CT evidence for acute intracranial pathology.      MEDICATIONS  (STANDING):  cefTRIAXone   IVPB      cefTRIAXone   IVPB 1 Gram(s) IV Intermittent every 24 hours  chlorhexidine 4% Liquid 1 Application(s) Topical <User Schedule>  cholestyramine Powder (Sugar-Free) 4 Gram(s) Oral daily  diphenoxylate/atropine 1 Tablet(s) Oral daily  enoxaparin Injectable 40 milliGRAM(s) SubCutaneous daily  furosemide    Tablet 40 milliGRAM(s) Oral daily  mesalamine DR Capsule 1200 milliGRAM(s) Oral two times a day  pantoprazole    Tablet 40 milliGRAM(s) Oral before breakfast  predniSONE   Tablet 5 milliGRAM(s) Oral daily    MEDICATIONS  (PRN):    Home Medications:  Apriso: 1.2 gram(s) orally 2 times a day (04 Sep 2018 21:29)  cholestyramine 4 g/5 g oral powder for reconstitution: 1 dose(s) orally once a day (04 Sep 2018 21:29)  Lomotil 2.5 mg-0.025 mg oral tablet: 2 tab(s) orally once a day (04 Sep 2018 21:29)  predniSONE 5 mg oral tablet: 1 tab(s) orally once a day (04 Sep 2018 21:29)    FAMILY HISTORY:  No pertinent family history in first degree relatives    SOCIAL HISTORY:  CIGARETTES:  ALCOHOL:    Vital Signs Last 24 Hrs  T(C): 35.7 (07 Sep 2018 05:49), Max: 36.4 (06 Sep 2018 14:27)  T(F): 96.2 (07 Sep 2018 05:49), Max: 97.5 (06 Sep 2018 14:27)  HR: 84 (07 Sep 2018 05:49) (77 - 87)  BP: 105/73 (07 Sep 2018 05:49) (95/52 - 123/58)  RR: 18 (07 Sep 2018 05:49) (18 - 18)  SpO2: 92% (06 Sep 2018 20:03) (92% - 92%)    INTERPRETATION OF TELEMETRY:    ECG: < from: 12 Lead ECG (18 @ 11:37) >  Ventricular Rate 93 BPM  Atrial Rate 93 BPM  P-R Interval 180 ms  QRS Duration 84 ms  Q-T Interval 350 ms  QTC Calculation(Bezfranchesca) 435 ms  P Axis 77 degrees  R Axis 83 degrees  T Axis 264 degrees  Diagnosis Line Normal sinus rhythm  ST & T wave abnormality, consider inferolateral ischemia  Abnormal ECG  < end of copied text >    LABS:                      13.4   6.69  )-----------( 241      ( 07 Sep 2018 09:07 )             42.3     09-    145  |  98  |  26<H>  ----------------------------<  209<H>  3.5   |  23  |  1.0    Ca    8.5      07 Sep 2018 09:07  Mg     1.5         TPro  5.5<L>  /  Alb  3.9  /  TBili  0.5  /  DBili  0.2  /  AST  15  /  ALT  32  /  AlkPhos  98  09-07    LIVER FUNCTIONS - ( 07 Sep 2018 09:07 )  Alb: 3.9 g/dL / Pro: 5.5 g/dL / ALK PHOS: 98 U/L / ALT: 32 U/L / AST: 15 U/L / GGT: x           I&O's Detail  06 Sep 2018 07:  -  07 Sep 2018 07:00  --------------------------------------------------------  IN:  Total IN: 0 mL  OUT:    Voided: 1500 mL  Total OUT: 1500 mL  Total NET: -1500 mL  07 Sep 2018 07:  -  07 Sep 2018 13:25  --------------------------------------------------------  IN:  Total IN: 0 mL  OUT:    Voided: 300 mL  Total OUT: 300 mL  Total NET: -300 mL  Daily Weight in k (07 Sep 2018 12:16) 86 year old lady with history of ITP, diverticulitis, SSS s/p PPM () presents after an episode of AMS in the setting of a UTI as well as SOB, RAYA, orthopnea. As per family, this has been recurring for the past 2-3 weeks where the patient has been waking in a state of delirium and screaming out in distress and the episodes usually resolve after a few minutes. However, this episode was much more prolonged, lasting almost an hour, so the family brought her to the ED for further evaluation. Of note, the patient was worked up by her PMD for possible neurologic etiology and it has been negative thus far. She has not had a chance to see her neurologist yet. Denies cp, palpitations, abd pain, n/v/d/c, numbness, paresthesia, recent travel, sick contacts. Admits to SOB, ELIZABETH, RAYA, orthopnea.    In ED, T 96.7F HR 88 bp 147/62 90% on RA. CTHNC was done due to AMS which came back unremarkable. Patient found to be in CHF and required BiPAP which made her feel better with improvement of saturation and was given lasix 40mg IV x1. BNP on admission was 03839.      Device  interrogation this morning shows patient is non dependent, but the battery is at EOL, no events and parameters in appropriate range.      PAST MEDICAL & SURGICAL HISTORY:  Immune thrombocytopenia  Artificial cardiac pacemaker    PREVIOUS DIAGNOSTIC TESTING:      ECHO: < from: Transthoracic Echocardiogram (18 @ 12:21) >  EXAM:  2-D ECHO (TTE) COMPLETE    PROCEDURE DATE:  2018    INTERPRETATION:  REPORT:    TRANSTHORACIC ECHOCARDIOGRAM REPORT   Summary:   1. Severely decreased global left ventricular systolic function.   2. Moderate to severe left atrial enlargement.   3. LV Ejection Fraction by Simmons's Method with a biplane EF of 29 %.   4. Spectral Doppler shows impaired relaxation pattern of left   ventricular myocardial filling (Grade I diastolic dysfunction).   5. Moderate pleural effusion in the left lateral region.   6. Moderate mitral valve regurgitation.   7. Mild-moderate tricuspid regurgitation.   8. Mild aortic regurgitation.   9. Peak transaortic gradient is 66.2 mmHg, mean transaortic gradient   equals 30.8 mmHg, the calculated aortic valve area equals 0.62 cm² by the   continuity equation consistent with severe aortic stenosis.  < end of copied text >      EXAM:  XR CHEST PORTABLE ROUTINE 1V        PROCEDURE DATE:  2018    INTERPRETATION:  CLINICAL HISTORY: CHF.  COMPARISON: Chest radiograph dated 2018.  IMPRESSION:    Stable pulmonary vascular congestion. Slightly decreased left pleural   effusion.      EXAM:  CT BRAIN        PROCEDURE DATE:  2018    INTERPRETATION:  Clinical History / Reason for exam: Altered mental   status, weakness. Recurring delirious episodes per family.  IMPRESSION:  No CT evidence for acute intracranial pathology.      MEDICATIONS  (STANDING):  cefTRIAXone   IVPB      cefTRIAXone   IVPB 1 Gram(s) IV Intermittent every 24 hours  chlorhexidine 4% Liquid 1 Application(s) Topical <User Schedule>  cholestyramine Powder (Sugar-Free) 4 Gram(s) Oral daily  diphenoxylate/atropine 1 Tablet(s) Oral daily  enoxaparin Injectable 40 milliGRAM(s) SubCutaneous daily  furosemide    Tablet 40 milliGRAM(s) Oral daily  mesalamine DR Capsule 1200 milliGRAM(s) Oral two times a day  pantoprazole    Tablet 40 milliGRAM(s) Oral before breakfast  predniSONE   Tablet 5 milliGRAM(s) Oral daily    MEDICATIONS  (PRN):    Home Medications:  Apriso: 1.2 gram(s) orally 2 times a day (04 Sep 2018 21:29)  cholestyramine 4 g/5 g oral powder for reconstitution: 1 dose(s) orally once a day (04 Sep 2018 21:29)  Lomotil 2.5 mg-0.025 mg oral tablet: 2 tab(s) orally once a day (04 Sep 2018 21:29)  predniSONE 5 mg oral tablet: 1 tab(s) orally once a day (04 Sep 2018 21:29)    FAMILY HISTORY:  No pertinent family history in first degree relatives    SOCIAL HISTORY:  CIGARETTES:  ALCOHOL:    Vital Signs Last 24 Hrs  T(C): 35.7 (07 Sep 2018 05:49), Max: 36.4 (06 Sep 2018 14:27)  T(F): 96.2 (07 Sep 2018 05:49), Max: 97.5 (06 Sep 2018 14:27)  HR: 84 (07 Sep 2018 05:49) (77 - 87)  BP: 105/73 (07 Sep 2018 05:49) (95/52 - 123/58)  RR: 18 (07 Sep 2018 05:49) (18 - 18)  SpO2: 92% (06 Sep 2018 20:03) (92% - 92%)    INTERPRETATION OF TELEMETRY:    ECG: < from: 12 Lead ECG (18 @ 11:37) >  Ventricular Rate 93 BPM  Atrial Rate 93 BPM  P-R Interval 180 ms  QRS Duration 84 ms  Q-T Interval 350 ms  QTC Calculation(Pilar) 435 ms  P Axis 77 degrees  R Axis 83 degrees  T Axis 264 degrees  Diagnosis Line Normal sinus rhythm  ST & T wave abnormality, consider inferolateral ischemia  Abnormal ECG  < end of copied text >    LABS:                      13.4   6.69  )-----------( 241      ( 07 Sep 2018 09:07 )             42.3     09-    145  |  98  |  26<H>  ----------------------------<  209<H>  3.5   |  23  |  1.0    Ca    8.5      07 Sep 2018 09:07  Mg     1.5         TPro  5.5<L>  /  Alb  3.9  /  TBili  0.5  /  DBili  0.2  /  AST  15  /  ALT  32  /  AlkPhos  98  09-07    LIVER FUNCTIONS - ( 07 Sep 2018 09:07 )  Alb: 3.9 g/dL / Pro: 5.5 g/dL / ALK PHOS: 98 U/L / ALT: 32 U/L / AST: 15 U/L / GGT: x           I&O's Detail  06 Sep 2018 07:  -  07 Sep 2018 07:00  --------------------------------------------------------  IN:  Total IN: 0 mL  OUT:    Voided: 1500 mL  Total OUT: 1500 mL  Total NET: -1500 mL  07 Sep 2018 07:  -  07 Sep 2018 13:25  --------------------------------------------------------  IN:  Total IN: 0 mL  OUT:    Voided: 300 mL  Total OUT: 300 mL  Total NET: -300 mL  Daily Weight in k (07 Sep 2018 12:16)

## 2018-09-07 NOTE — PROGRESS NOTE ADULT - SUBJECTIVE AND OBJECTIVE BOX
SUBJECTIVE:    Patient is a 86y old Female who presents with a chief complaint of AMS and acute on chronic heart failure (07 Sep 2018 13:18)    Currently admitted to medicine with the primary diagnosis of CHF (congestive heart failure)     Today is hospital day 3d. Overnight no event. A&Ox1 (knows name, hospital in [place]). Pt denied CP, SOB, orthopnea, Gi pain, suprapubic pain, burning on urination, freq)    PAST MEDICAL & SURGICAL HISTORY  Immune thrombocytopenia  Artificial cardiac pacemaker    SOCIAL HISTORY:  Negative for smoking/alcohol/drug use.     ALLERGIES:  No Known Allergies    MEDICATIONS:  STANDING MEDICATIONS  cefTRIAXone   IVPB      cefTRIAXone   IVPB 1 Gram(s) IV Intermittent every 24 hours  chlorhexidine 4% Liquid 1 Application(s) Topical <User Schedule>  cholestyramine Powder (Sugar-Free) 4 Gram(s) Oral daily  diphenoxylate/atropine 1 Tablet(s) Oral daily  enoxaparin Injectable 40 milliGRAM(s) SubCutaneous daily  furosemide    Tablet 40 milliGRAM(s) Oral daily  magnesium oxide 400 milliGRAM(s) Oral three times a day with meals  magnesium sulfate  IVPB 2 Gram(s) IV Intermittent once  mesalamine DR Capsule 1200 milliGRAM(s) Oral two times a day  pantoprazole    Tablet 40 milliGRAM(s) Oral before breakfast  predniSONE   Tablet 5 milliGRAM(s) Oral daily    PRN MEDICATIONS    VITALS:   T(F): 96.5  HR: 82  BP: 99/52  RR: 18  SpO2: 92%    LABS:                        13.4   6.69  )-----------( 241      ( 07 Sep 2018 09:07 )             42.3     09-07    145  |  98  |  26<H>  ----------------------------<  209<H>  3.5   |  23  |  1.0    Ca    8.5      07 Sep 2018 09:07  Mg     1.5     09-07    TPro  5.5<L>  /  Alb  3.9  /  TBili  0.5  /  DBili  0.2  /  AST  15  /  ALT  32  /  AlkPhos  98  09-07              Culture - Blood (collected 05 Sep 2018 00:38)  Source: .Blood None  Preliminary Report (06 Sep 2018 06:01):    No growth to date.          RADIOLOGY:    PHYSICAL EXAM:  GENERAL: NAD, speaks in full sentences, no signs of respiratory distress  HEAD:  Atraumatic, Normocephalic  EYES: EOMI, PERRLA, conjunctiva and sclera clear  NECK: Supple, No JVD  CHEST/LUNG: CTAB; No wheeze; No crackles; No accessory muscles used  HEART: Regular rate and rhythm; No murmurs;   ABDOMEN: Soft, Nontender, Nondistended; Bowel sounds present; No guarding  EXTREMITIES:  2+ Peripheral Pulses, No cyanosis or 1+ b/l LE edema  PSYCH: AAOx1  NEUROLOGY: non-focal  SKIN: No rashes or lesions    Intravenous access:   NG tube:   Tapia Catheter:

## 2018-09-07 NOTE — DISCHARGE NOTE ADULT - PLAN OF CARE
Managing symptoms fond to have severe AS, not a candidate for TAVR or surgery. Will manage CFH medically, follow-up with Dr. Mendoza as OP. in 3 days. Had battery replaced Incidental finding on CXR follow up with PCP

## 2018-09-07 NOTE — PROGRESS NOTE ADULT - ASSESSMENT
86 year old lady with PMHx ITP, diverticulitis, PPM presents after an episode of AMS a/w SOB, RAYA, orthopnea.    #SOB Likely secondary to CHF Exacerbation   -BNP 38952 on admission  -CXR: interval CHF  -cardio- change lasix to 40 qd. Will take care of AS as outpt   start metoprolol er 25 mg po qd can give as long as systolic greater than 90   f/u with dr. chavarria one week after D/C.   -monitor strict I&O, daily weights  -1.5L/day fluid restriction  -c/w BiPAP PRN  -TTE- EF 29%, severe AS, G1DD, mod pleural effusion L lateral, mod MVR, mod TR, mild AR     #AICD battery replacement   EP- awaiting resolution UTI   UA and UCx on Mon 9/10  Pt scheduled for Wed if UA and UCx shows no UTi      #AMS 2/2 UTI vs underlying dementia   -WBC 15 on admission, afebrile. WBC 7.21 now. No complaints of dysuria, increased frequency.   -UA(+) in ED  -UCx >100,000 E coli  -c/w rocephin (9/6- )    #ITP  -stable  -c/w home meds    #Abnormal LFT's   RUQ Abdominal sonogram- Liver normal, S/p cholecystectomy. Mild right hydronephrosis. Right pleural effusion.   LFT wnl    #Hypomagnesemia  Mg 1.5  Mg oxide 400 tid  Mg IV 2 once    DVT ppx  GI ppx  CHG 4% daily  ambulate as tolerated  DASH/TLC with 1.5L/day fluid restriction    Dispo  -from home, lives with   -PT/rehab- SNF or home. Pt wants to go home    FULL CODE

## 2018-09-07 NOTE — DISCHARGE NOTE ADULT - HOSPITAL COURSE
86 year old lady with PMHx ITP, diverticulitis, PPM presents after an episode of AMS a/w SOB, RAYA, orthopnea. As per family, this has been recurring for the past 2-3 weeks where the patient has been waking in a state of delirium and screaming out in distress and the episodes usually resolve after a few minutes. However, today this episode was much more prolonged, lasting almost an hour, so the family brought her to the ED for further evaluation. Of note, the patient was worked up by her PMD for possible neurologic etiology and it has been negative thus far. She has not had a chance to see her neurologist yet. Denies cp, palpitations, abd pain, n/v/d/c, numbness, paresthesia, recent travel, sick contacts. Admits to SOB, ELIZABETH, RAYA, orthopnea.    In ED, T 96.7F HR 88 bp 147/62 90% on RA. CTHNC was done due to AMS which came back unremarkable. Patient found to be in CHF and required BiPAP which made her feel better with improvement of saturation and was given lasix 40mg IV x1. BNP on admission was 28170    found to have severe AS, not a candidate for TAVR or surgery. Will manage CFH medically, follow-up with Dr. Mendoza as OP. in 3 days. Had battery replaced

## 2018-09-07 NOTE — PROGRESS NOTE ADULT - SUBJECTIVE AND OBJECTIVE BOX
SUBJ:  pt comfortable had AICD checked and needs battery changed.     MEDICATIONS  (STANDING):  cefTRIAXone   IVPB      cefTRIAXone   IVPB 1 Gram(s) IV Intermittent every 24 hours  chlorhexidine 4% Liquid 1 Application(s) Topical <User Schedule>  cholestyramine Powder (Sugar-Free) 4 Gram(s) Oral daily  diphenoxylate/atropine 1 Tablet(s) Oral daily  enoxaparin Injectable 40 milliGRAM(s) SubCutaneous daily  furosemide    Tablet 40 milliGRAM(s) Oral daily  mesalamine DR Capsule 1200 milliGRAM(s) Oral two times a day  pantoprazole    Tablet 40 milliGRAM(s) Oral before breakfast  predniSONE   Tablet 5 milliGRAM(s) Oral daily    MEDICATIONS  (PRN):            Vital Signs Last 24 Hrs  T(C): 35.7 (07 Sep 2018 05:49), Max: 36.4 (06 Sep 2018 14:27)  T(F): 96.2 (07 Sep 2018 05:49), Max: 97.5 (06 Sep 2018 14:27)  HR: 84 (07 Sep 2018 05:49) (77 - 87)  BP: 105/73 (07 Sep 2018 05:49) (95/52 - 123/58)  BP(mean): --  RR: 18 (07 Sep 2018 05:49) (18 - 18)  SpO2: 92% (06 Sep 2018 20:03) (92% - 92%)    REVIEW OF SYSTEMS:  see hpi all other systems negative     PHYSICAL EXAM:  ·  ·RESPIRATORY:    clear to auscultation bilaterally  · CARDIOVASCULAR	regular rate and rhythm  no rub  no murmur  normal PMI no JVD   . GASTROINTESTINAL:  no tenderness   · EXTREMITIES: No cyanosis, clubbing or edema  · VASCULAR: 	Equal and normal pulses (carotid, femoral, dorsalis pedis)  ·NEUROLOGICAL:   alert and oriented x 3;     TELEMETRY:    ECG:    TTE:    LABS:                        13.4   6.69  )-----------( 241      ( 07 Sep 2018 09:07 )             42.3     09-07    145  |  98  |  26<H>  ----------------------------<  209<H>  3.5   |  23  |  1.0    Ca    8.5      07 Sep 2018 09:07  Mg     1.5     09-07    TPro  5.5<L>  /  Alb  3.9  /  TBili  0.5  /  DBili  0.2  /  AST  15  /  ALT  32  /  AlkPhos  98  09-07            I&O's Summary    06 Sep 2018 07:01  -  07 Sep 2018 07:00  --------------------------------------------------------  IN: 0 mL / OUT: 1500 mL / NET: -1500 mL    07 Sep 2018 07:01  -  07 Sep 2018 12:44  --------------------------------------------------------  IN: 0 mL / OUT: 300 mL / NET: -300 mL      BNP  RADIOLOGY & ADDITIONAL STUDIES:    IMPRESSION AND PLAN:

## 2018-09-08 LAB
ALBUMIN SERPL ELPH-MCNC: 3.6 G/DL — SIGNIFICANT CHANGE UP (ref 3.5–5.2)
ALP SERPL-CCNC: 87 U/L — SIGNIFICANT CHANGE UP (ref 30–115)
ALT FLD-CCNC: 24 U/L — SIGNIFICANT CHANGE UP (ref 0–41)
ANION GAP SERPL CALC-SCNC: 14 MMOL/L — SIGNIFICANT CHANGE UP (ref 7–14)
AST SERPL-CCNC: 14 U/L — SIGNIFICANT CHANGE UP (ref 0–41)
BILIRUB SERPL-MCNC: 0.5 MG/DL — SIGNIFICANT CHANGE UP (ref 0.2–1.2)
BLD GP AB SCN SERPL QL: SIGNIFICANT CHANGE UP
BUN SERPL-MCNC: 27 MG/DL — HIGH (ref 10–20)
CALCIUM SERPL-MCNC: 8.3 MG/DL — LOW (ref 8.5–10.1)
CHLORIDE SERPL-SCNC: 98 MMOL/L — SIGNIFICANT CHANGE UP (ref 98–110)
CO2 SERPL-SCNC: 32 MMOL/L — SIGNIFICANT CHANGE UP (ref 17–32)
CREAT SERPL-MCNC: 0.7 MG/DL — SIGNIFICANT CHANGE UP (ref 0.7–1.5)
GLUCOSE SERPL-MCNC: 106 MG/DL — HIGH (ref 70–99)
HCT VFR BLD CALC: 39.5 % — SIGNIFICANT CHANGE UP (ref 37–47)
HGB BLD-MCNC: 12.7 G/DL — SIGNIFICANT CHANGE UP (ref 12–16)
MAGNESIUM SERPL-MCNC: 2.1 MG/DL — SIGNIFICANT CHANGE UP (ref 1.8–2.4)
MCHC RBC-ENTMCNC: 28.5 PG — SIGNIFICANT CHANGE UP (ref 27–31)
MCHC RBC-ENTMCNC: 32.2 G/DL — SIGNIFICANT CHANGE UP (ref 32–37)
MCV RBC AUTO: 88.6 FL — SIGNIFICANT CHANGE UP (ref 81–99)
NRBC # BLD: 0 /100 WBCS — SIGNIFICANT CHANGE UP (ref 0–0)
PLATELET # BLD AUTO: 206 K/UL — SIGNIFICANT CHANGE UP (ref 130–400)
POTASSIUM SERPL-MCNC: 3.4 MMOL/L — LOW (ref 3.5–5)
POTASSIUM SERPL-SCNC: 3.4 MMOL/L — LOW (ref 3.5–5)
PROT SERPL-MCNC: 5.6 G/DL — LOW (ref 6–8)
RBC # BLD: 4.46 M/UL — SIGNIFICANT CHANGE UP (ref 4.2–5.4)
RBC # FLD: 12.6 % — SIGNIFICANT CHANGE UP (ref 11.5–14.5)
SODIUM SERPL-SCNC: 144 MMOL/L — SIGNIFICANT CHANGE UP (ref 135–146)
T PALLIDUM AB TITR SER: NEGATIVE — SIGNIFICANT CHANGE UP
TSH SERPL-MCNC: 1.7 UIU/ML — SIGNIFICANT CHANGE UP (ref 0.27–4.2)
TYPE + AB SCN PNL BLD: SIGNIFICANT CHANGE UP
VIT B12 SERPL-MCNC: 1686 PG/ML — HIGH (ref 232–1245)
WBC # BLD: 7.39 K/UL — SIGNIFICANT CHANGE UP (ref 4.8–10.8)
WBC # FLD AUTO: 7.39 K/UL — SIGNIFICANT CHANGE UP (ref 4.8–10.8)

## 2018-09-08 RX ORDER — POTASSIUM CHLORIDE 20 MEQ
40 PACKET (EA) ORAL ONCE
Qty: 0 | Refills: 0 | Status: COMPLETED | OUTPATIENT
Start: 2018-09-08 | End: 2018-09-08

## 2018-09-08 RX ADMIN — MAGNESIUM OXIDE 400 MG ORAL TABLET 400 MILLIGRAM(S): 241.3 TABLET ORAL at 07:44

## 2018-09-08 RX ADMIN — CHLORHEXIDINE GLUCONATE 1 APPLICATION(S): 213 SOLUTION TOPICAL at 05:39

## 2018-09-08 RX ADMIN — CHOLESTYRAMINE 4 GRAM(S): 4 POWDER, FOR SUSPENSION ORAL at 09:08

## 2018-09-08 RX ADMIN — Medication 40 MILLIEQUIVALENT(S): at 13:34

## 2018-09-08 RX ADMIN — MAGNESIUM OXIDE 400 MG ORAL TABLET 400 MILLIGRAM(S): 241.3 TABLET ORAL at 11:34

## 2018-09-08 RX ADMIN — Medication 1200 MILLIGRAM(S): at 05:37

## 2018-09-08 RX ADMIN — Medication 1200 MILLIGRAM(S): at 17:40

## 2018-09-08 RX ADMIN — Medication 40 MILLIGRAM(S): at 05:39

## 2018-09-08 RX ADMIN — CEFTRIAXONE 100 GRAM(S): 500 INJECTION, POWDER, FOR SOLUTION INTRAMUSCULAR; INTRAVENOUS at 16:12

## 2018-09-08 RX ADMIN — Medication 25 MILLIGRAM(S): at 05:37

## 2018-09-08 RX ADMIN — Medication 5 MILLIGRAM(S): at 05:37

## 2018-09-08 RX ADMIN — PANTOPRAZOLE SODIUM 40 MILLIGRAM(S): 20 TABLET, DELAYED RELEASE ORAL at 06:13

## 2018-09-08 RX ADMIN — ENOXAPARIN SODIUM 40 MILLIGRAM(S): 100 INJECTION SUBCUTANEOUS at 11:35

## 2018-09-08 NOTE — PROGRESS NOTE ADULT - ASSESSMENT
86 year old lady with PMHx ITP, diverticulitis, PPM presents after an episode of AMS a/w SOB, RAYA, orthopnea.    #SOB Likely secondary to CHF Exacerbation   -BNP 96517 on admission  -CXR: interval CHF  -cardio- lasix to 40 qd. Will take care of AS as outpt   c/w metoprolol er 25 mg po qd   -monitor strict I&O, daily weights  -1.5L/day fluid restriction  -c/w BiPAP PRN  -TTE- EF 29%, severe AS, G1DD, mod pleural effusion L lateral, mod MVR, mod TR, mild AR     #AICD battery replacement   EP- awaiting resolution UTI. Scheduled for Wed if UA and UCx shows no UTi  UA and UCx on Mon 9/10        #AMS 2/2 UTI vs underlying dementia   -WBC 15 on admission, afebrile. WBC 7.39 now. No complaints of dysuria or increased frequency.   -UA(+) in ED  -UCx >100,000 E coli  -c/w rocephin (9/6- )    #ITP  -stable  -c/w home meds    #Abnormal LFT's   RUQ Abdominal sonogram- Liver normal, S/p cholecystectomy. Mild right hydronephrosis. Right pleural effusion.   LFT wnl today    #Hypomagnesemia  Mg 2.1      DVT ppx  GI ppx  CHG 4% daily  ambulate as tolerated  DASH/TLC with 1.5L/day fluid restriction    Dispo  -from home, lives with   -PT/rehab- SNF or home. Pt wants to go home    FULL CODE 86 year old lady with PMHx ITP, diverticulitis, PPM presents after an episode of AMS a/w SOB, RAYA, orthopnea.    #SOB Likely secondary to CHF Exacerbation   -BNP 02649 on admission  -CXR: interval CHF  -cardio- lasix to 40 qd. Will take care of AS as outpt   c/w metoprolol er 25 mg po qd   If BP is stablizes will add ACE-I   -monitor strict I&O, daily weights  -1.5L/day fluid restriction  -c/w BiPAP PRN  -TTE- EF 29%, severe AS, G1DD, mod pleural effusion L lateral, mod MVR, mod TR, mild AR     #AICD battery replacement   EP- awaiting resolution UTI. Scheduled for Wed if UA and UCx shows no UTi  UA and UCx on Mon 9/10        #AMS 2/2 UTI vs underlying dementia   -WBC 15 on admission, afebrile. WBC 7.39 now. No complaints of dysuria or increased frequency.   -UA(+) in ED  -UCx >100,000 E coli  -c/w rocephin (9/6- )    #ITP  -stable  -c/w home meds    #Abnormal LFT's   RUQ Abdominal sonogram- Liver normal, S/p cholecystectomy. Mild right hydronephrosis. Right pleural effusion.   LFT wnl today    #Hypomagnesemia  Mg 2.1      DVT ppx  GI ppx  CHG 4% daily  ambulate as tolerated  DASH/TLC with 1.5L/day fluid restriction    Dispo  -from home, lives with   -PT/rehab- SNF or home. Pt wants to go home    FULL CODE

## 2018-09-08 NOTE — PROGRESS NOTE ADULT - SUBJECTIVE AND OBJECTIVE BOX
SHELLEY PETIT  86y Female    CHIEF COMPLAINT:    Patient is a 86y old  Female who presents with a chief complaint of AMS (07 Sep 2018 16:31)      INTERVAL HPI/OVERNIGHT EVENTS:    Patient seen and examined. Confused. Looks comfortable. C/O feeling tired. Hypotensive    ROS: All other systems are negative.    Vital Signs:    T(F): 97 (18 @ 05:28), Max: 97.9 (18 @ 20:33)  HR: 80 (18 @ 05:28) (80 - 88)  BP: 118/65 (18 @ 05:28) (99/52 - 118/65)  RR: 18 (18 @ 05:28) (18 - 18)  SpO2: --  I&O's Summary    07 Sep 2018 07:01  -  08 Sep 2018 07:00  --------------------------------------------------------  IN: 0 mL / OUT: 600 mL / NET: -600 mL      Daily     Daily Weight in k.2 (08 Sep 2018 05:28)  CAPILLARY BLOOD GLUCOSE          PHYSICAL EXAM:    GENERAL:  NAD  SKIN: No rashes or lesions  HENT: Atrumatic. Normocephalic. PERRL. Moist membranes.  NECK: Supple, No JVD. No lymphadenopathy.  PULMONARY: BS are decreased in the bases B/L. No wheezing. No rales  CVS: Normal S1, S2. Rate and Rythm are regular. No murmurs.  ABDOMEN/GI: Soft, Nontender, Nondistended; BS present  EXTREMITIES: Peripheral pulses intact. No edema B/L LE.  NEUROLOGIC:  No motor or sensory deficit.  PSYCH: Alert & oriented x 0    Consultant(s) Notes Reviewed:  [x ] YES  [ ] NO  Care Discussed with Consultants/Other Providers [ x] YES  [ ] NO    EKG reviewed  Telemetry reviewed    LABS:                        13.4   6.69  )-----------( 241      ( 07 Sep 2018 09:07 )             42.3     -    145  |  98  |  26<H>  ----------------------------<  209<H>  3.5   |  23  |  1.0    Ca    8.5      07 Sep 2018 09:07  Mg     1.5         TPro  5.5<L>  /  Alb  3.9  /  TBili  0.5  /  DBili  0.2  /  AST  15  /  ALT  32  /  AlkPhos  98        Serum Pro-Brain Natriuretic Peptide: 02634 pg/mL (18 @ 13:33)          RADIOLOGY & ADDITIONAL TESTS:      Imaging or report Personally Reviewed:  [ ] YES  [ ] NO    Medications:  Standing  cefTRIAXone   IVPB      cefTRIAXone   IVPB 1 Gram(s) IV Intermittent every 24 hours  chlorhexidine 4% Liquid 1 Application(s) Topical <User Schedule>  cholestyramine Powder (Sugar-Free) 4 Gram(s) Oral daily  diphenoxylate/atropine 1 Tablet(s) Oral daily  enoxaparin Injectable 40 milliGRAM(s) SubCutaneous daily  furosemide    Tablet 40 milliGRAM(s) Oral daily  magnesium oxide 400 milliGRAM(s) Oral three times a day with meals  mesalamine DR Capsule 1200 milliGRAM(s) Oral two times a day  metoprolol succinate ER 25 milliGRAM(s) Oral daily  pantoprazole    Tablet 40 milliGRAM(s) Oral before breakfast  predniSONE   Tablet 5 milliGRAM(s) Oral daily    PRN Meds      Case discussed with resident    Care discussed with pt/family

## 2018-09-08 NOTE — PROGRESS NOTE ADULT - ASSESSMENT
86 year old lady with PMHx ITP, diverticulitis, PPM presents after an episode of AMS a/w SOB, RAYA, orthopnea. As per family, this has been recurring for the past 2-3 weeks where the patient has been waking in a state of delirium and screaming out in distress and the episodes usually resolve after a few minutes    1.	Ac. HFrEF/CM  2.	Toxic/Metabolic Encephalopathy  3.	UTI  4.	S/P PPM  5.	H/O ITP  6.	B/L Pleural effusion  7.	PPM malfunction  8.	Severe AS/Mod Mitral regurgitation  9.	Hypotension         PLAN:    ·	Cont lasix 40 mg po q 24h   ·	Cont metoprolol 25 mg po q 12h. If BP is stablizes will add ACE-I   ·	Cont IV Rocephin  ·	EP will replace the battery next week  ·	As per card pt is not a candidate for surgery or TAVR.

## 2018-09-08 NOTE — PROGRESS NOTE ADULT - SUBJECTIVE AND OBJECTIVE BOX
SUBJECTIVE:    Patient is a 86y old Female who presents with a chief complaint of AMS (08 Sep 2018 08:43)    Currently admitted to medicine with the primary diagnosis of CHF (congestive heart failure)     Today is hospital day 4d. A&Ox1 (name, hospital in [place]). Denies CP, SOB, pain on urination, suprapubic pain, GI pain.    PAST MEDICAL & SURGICAL HISTORY  Immune thrombocytopenia  Artificial cardiac pacemaker    SOCIAL HISTORY:  Negative for smoking/alcohol/drug use.     ALLERGIES:  No Known Allergies    MEDICATIONS:  STANDING MEDICATIONS  cefTRIAXone   IVPB      cefTRIAXone   IVPB 1 Gram(s) IV Intermittent every 24 hours  chlorhexidine 4% Liquid 1 Application(s) Topical <User Schedule>  cholestyramine Powder (Sugar-Free) 4 Gram(s) Oral daily  diphenoxylate/atropine 1 Tablet(s) Oral daily  enoxaparin Injectable 40 milliGRAM(s) SubCutaneous daily  furosemide    Tablet 40 milliGRAM(s) Oral daily  mesalamine DR Capsule 1200 milliGRAM(s) Oral two times a day  metoprolol succinate ER 25 milliGRAM(s) Oral daily  pantoprazole    Tablet 40 milliGRAM(s) Oral before breakfast  predniSONE   Tablet 5 milliGRAM(s) Oral daily    PRN MEDICATIONS    VITALS:   T(F): 98.2  HR: 95  BP: 123/62  RR: 18  SpO2: --    LABS:                        12.7   7.39  )-----------( 206      ( 08 Sep 2018 08:43 )             39.5     09-08    144  |  98  |  27<H>  ----------------------------<  106<H>  3.4<L>   |  32  |  0.7    Ca    8.3<L>      08 Sep 2018 08:43  Mg     2.1     09-08    TPro  5.6<L>  /  Alb  3.6  /  TBili  0.5  /  DBili  x   /  AST  14  /  ALT  24  /  AlkPhos  87  09-08                  RADIOLOGY:    PHYSICAL EXAM:  GENERAL: NAD, speaks in full sentences, no signs of respiratory distress  HEAD:  Atraumatic, Normocephalic  EYES: EOMI, PERRLA, conjunctiva and sclera clear  NECK: Supple, No JVD  CHEST/LUNG: CTAB; No wheeze; No crackles; No accessory muscles used  HEART: Regular rate and rhythm; No murmurs;   ABDOMEN: Soft, Nontender, Nondistended; Bowel sounds present; No guarding  EXTREMITIES:  2+ Peripheral Pulses, No cyanosis. 1+ pitting edema  PSYCH: AAOx1  NEUROLOGY: non-focal  SKIN: No rashes or lesions    Intravenous access:   NG tube:   Tapia Catheter:

## 2018-09-09 LAB
ALBUMIN SERPL ELPH-MCNC: 3.9 G/DL — SIGNIFICANT CHANGE UP (ref 3.5–5.2)
ALP SERPL-CCNC: 88 U/L — SIGNIFICANT CHANGE UP (ref 30–115)
ALT FLD-CCNC: 21 U/L — SIGNIFICANT CHANGE UP (ref 0–41)
ANION GAP SERPL CALC-SCNC: 18 MMOL/L — HIGH (ref 7–14)
AST SERPL-CCNC: 12 U/L — SIGNIFICANT CHANGE UP (ref 0–41)
BILIRUB SERPL-MCNC: 0.5 MG/DL — SIGNIFICANT CHANGE UP (ref 0.2–1.2)
BUN SERPL-MCNC: 24 MG/DL — HIGH (ref 10–20)
CALCIUM SERPL-MCNC: 8.8 MG/DL — SIGNIFICANT CHANGE UP (ref 8.5–10.1)
CHLORIDE SERPL-SCNC: 95 MMOL/L — LOW (ref 98–110)
CO2 SERPL-SCNC: 27 MMOL/L — SIGNIFICANT CHANGE UP (ref 17–32)
CREAT SERPL-MCNC: 0.8 MG/DL — SIGNIFICANT CHANGE UP (ref 0.7–1.5)
GLUCOSE SERPL-MCNC: 249 MG/DL — HIGH (ref 70–99)
HCT VFR BLD CALC: 43.4 % — SIGNIFICANT CHANGE UP (ref 37–47)
HGB BLD-MCNC: 13.8 G/DL — SIGNIFICANT CHANGE UP (ref 12–16)
MAGNESIUM SERPL-MCNC: 2.1 MG/DL — SIGNIFICANT CHANGE UP (ref 1.8–2.4)
MCHC RBC-ENTMCNC: 28.2 PG — SIGNIFICANT CHANGE UP (ref 27–31)
MCHC RBC-ENTMCNC: 31.8 G/DL — LOW (ref 32–37)
MCV RBC AUTO: 88.6 FL — SIGNIFICANT CHANGE UP (ref 81–99)
NRBC # BLD: 0 /100 WBCS — SIGNIFICANT CHANGE UP (ref 0–0)
PLATELET # BLD AUTO: 259 K/UL — SIGNIFICANT CHANGE UP (ref 130–400)
POTASSIUM SERPL-MCNC: 4.1 MMOL/L — SIGNIFICANT CHANGE UP (ref 3.5–5)
POTASSIUM SERPL-SCNC: 4.1 MMOL/L — SIGNIFICANT CHANGE UP (ref 3.5–5)
PROT SERPL-MCNC: 5.9 G/DL — LOW (ref 6–8)
RBC # BLD: 4.9 M/UL — SIGNIFICANT CHANGE UP (ref 4.2–5.4)
RBC # FLD: 12.7 % — SIGNIFICANT CHANGE UP (ref 11.5–14.5)
SODIUM SERPL-SCNC: 140 MMOL/L — SIGNIFICANT CHANGE UP (ref 135–146)
WBC # BLD: 7.7 K/UL — SIGNIFICANT CHANGE UP (ref 4.8–10.8)
WBC # FLD AUTO: 7.7 K/UL — SIGNIFICANT CHANGE UP (ref 4.8–10.8)

## 2018-09-09 RX ORDER — SENNA PLUS 8.6 MG/1
2 TABLET ORAL AT BEDTIME
Qty: 0 | Refills: 0 | Status: DISCONTINUED | OUTPATIENT
Start: 2018-09-09 | End: 2018-09-14

## 2018-09-09 RX ORDER — DOCUSATE SODIUM 100 MG
100 CAPSULE ORAL THREE TIMES A DAY
Qty: 0 | Refills: 0 | Status: DISCONTINUED | OUTPATIENT
Start: 2018-09-09 | End: 2018-09-14

## 2018-09-09 RX ORDER — LOSARTAN POTASSIUM 100 MG/1
25 TABLET, FILM COATED ORAL DAILY
Qty: 0 | Refills: 0 | Status: DISCONTINUED | OUTPATIENT
Start: 2018-09-09 | End: 2018-09-12

## 2018-09-09 RX ADMIN — Medication 40 MILLIGRAM(S): at 05:22

## 2018-09-09 RX ADMIN — SENNA PLUS 2 TABLET(S): 8.6 TABLET ORAL at 21:47

## 2018-09-09 RX ADMIN — Medication 100 MILLIGRAM(S): at 21:47

## 2018-09-09 RX ADMIN — CHLORHEXIDINE GLUCONATE 1 APPLICATION(S): 213 SOLUTION TOPICAL at 05:21

## 2018-09-09 RX ADMIN — Medication 1200 MILLIGRAM(S): at 05:22

## 2018-09-09 RX ADMIN — CEFTRIAXONE 100 GRAM(S): 500 INJECTION, POWDER, FOR SOLUTION INTRAMUSCULAR; INTRAVENOUS at 17:08

## 2018-09-09 RX ADMIN — PANTOPRAZOLE SODIUM 40 MILLIGRAM(S): 20 TABLET, DELAYED RELEASE ORAL at 05:22

## 2018-09-09 RX ADMIN — CHOLESTYRAMINE 4 GRAM(S): 4 POWDER, FOR SUSPENSION ORAL at 10:54

## 2018-09-09 RX ADMIN — ENOXAPARIN SODIUM 40 MILLIGRAM(S): 100 INJECTION SUBCUTANEOUS at 11:05

## 2018-09-09 RX ADMIN — Medication 1200 MILLIGRAM(S): at 17:08

## 2018-09-09 RX ADMIN — Medication 25 MILLIGRAM(S): at 05:22

## 2018-09-09 RX ADMIN — Medication 5 MILLIGRAM(S): at 05:22

## 2018-09-09 NOTE — PROGRESS NOTE ADULT - ASSESSMENT
86 year old lady with PMHx ITP, diverticulitis, PPM presents after an episode of AMS a/w SOB, RAYA, orthopnea. As per family, this has been recurring for the past 2-3 weeks where the patient has been waking in a state of delirium and screaming out in distress and the episodes usually resolve after a few minutes    1.	Ac. HFrEF/CM  2.	Toxic/Metabolic Encephalopathy  3.	UTI  4.	S/P PPM  5.	H/O ITP  6.	B/L Pleural effusion  7.	PPM malfunction  8.	Severe AS/Mod Mitral regurgitation  9.	Hypotension         PLAN:    ·	Cont lasix 40 mg po q 24h   ·	Cont metoprolol 25 mg po q 12h. BP is stable now. Will add losartan 25 mg jeffrey q 24h  ·	Cont IV Rocephin  ·	EP will replace the battery next week  ·	As per card pt is not a candidate for surgery or TAVR.

## 2018-09-09 NOTE — PROGRESS NOTE ADULT - SUBJECTIVE AND OBJECTIVE BOX
SHELLEY PETIT  86y Female    CHIEF COMPLAINT:    Patient is a 86y old  Female who presents with a chief complaint of AMS (08 Sep 2018 20:37)      INTERVAL HPI/OVERNIGHT EVENTS:    Patient seen and examined. Confused. Sitting in a chair. No sob. No dizziness. Generator change next week    ROS: All other systems are negative.    Vital Signs:    T(F): 96.5 (18 @ 05:46), Max: 98.2 (18 @ 20:25)  HR: 74 (18 @ 05:46) (74 - 95)  BP: 125/59 (18 @ 05:46) (123/62 - 131/62)  RR: 18 (18 @ 05:46) (18 - 18)  SpO2: 95% (18 @ 22:15) (95% - 95%)  I&O's Summary    08 Sep 2018 07:01  -  09 Sep 2018 07:00  --------------------------------------------------------  IN: 0 mL / OUT: 900 mL / NET: -900 mL      Daily     Daily Weight in k.6 (09 Sep 2018 05:46)  CAPILLARY BLOOD GLUCOSE          PHYSICAL EXAM:    GENERAL:  NAD  SKIN: No rashes or lesions  HENT: Atrumatic. Normocephalic. PERRL. Moist membranes.  NECK: Supple, No JVD. No lymphadenopathy.  PULMONARY: BS are decreased in the bases B/L. No wheezing. No rales  CVS: Normal S1, S2. Rate and Rythm are regular. No murmurs.  ABDOMEN/GI: Soft, Nontender, Nondistended; BS present  EXTREMITIES: Peripheral pulses intact. No edema B/L LE.  NEUROLOGIC:  No motor or sensory deficit.  PSYCH: Alert & oriented x 0    Consultant(s) Notes Reviewed:  [x ] YES  [ ] NO  Care Discussed with Consultants/Other Providers [ x] YES  [ ] NO    EKG reviewed  Telemetry reviewed    LABS:                        13.8   7.70  )-----------( 259      ( 09 Sep 2018 09:28 )             43.4         144  |  98  |  27<H>  ----------------------------<  106<H>  3.4<L>   |  32  |  0.7    Ca    8.3<L>      08 Sep 2018 08:43  Mg     2.1         TPro  5.6<L>  /  Alb  3.6  /  TBili  0.5  /  DBili  x   /  AST  14  /  ALT  24  /  AlkPhos  87        Serum Pro-Brain Natriuretic Peptide: 76599 pg/mL (18 @ 13:33)          RADIOLOGY & ADDITIONAL TESTS:      Imaging or report Personally Reviewed:  [ ] YES  [ ] NO    Medications:  Standing  cefTRIAXone   IVPB      cefTRIAXone   IVPB 1 Gram(s) IV Intermittent every 24 hours  chlorhexidine 4% Liquid 1 Application(s) Topical <User Schedule>  cholestyramine Powder (Sugar-Free) 4 Gram(s) Oral daily  diphenoxylate/atropine 1 Tablet(s) Oral daily  enoxaparin Injectable 40 milliGRAM(s) SubCutaneous daily  furosemide    Tablet 40 milliGRAM(s) Oral daily  mesalamine DR Capsule 1200 milliGRAM(s) Oral two times a day  metoprolol succinate ER 25 milliGRAM(s) Oral daily  pantoprazole    Tablet 40 milliGRAM(s) Oral before breakfast  predniSONE   Tablet 5 milliGRAM(s) Oral daily    PRN Meds      Case discussed with resident    Care discussed with pt/family

## 2018-09-09 NOTE — PROGRESS NOTE ADULT - SUBJECTIVE AND OBJECTIVE BOX
COVERING Dr. FRANCO    HPI:  86 year old lady with PMHx ITP, diverticulitis, PPM presents after an episode of AMS a/w SOB, RAYA, orthopnea. As per family, this has been recurring for the past 2-3 weeks where the patient has been waking in a state of delirium and screaming out in distress and the episodes usually resolve after a few minutes. However, today this episode was much more prolonged, lasting almost an hour, so the family brought her to the ED for further evaluation. Of note, the patient was worked up by her PMD for possible neurologic etiology and it has been negative thus far. She has not had a chance to see her neurologist yet. Denies cp, palpitations, abd pain, n/v/d/c, numbness, paresthesia, recent travel, sick contacts. Admits to SOB, ELIZABETH, RAYA, orthopnea.    In ED, T 96.7F HR 88 bp 147/62 90% on RA. CTHNC was done due to AMS which came back unremarkable. Patient found to be in CHF and required BiPAP which made her feel better with improvement of saturation and was given lasix 40mg IV x1. BNP on admission was 01900. (04 Sep 2018 20:30)      SUBJECTIVE:  doing well; no complaints; no events overnight; no chest pain, no SOB, no dizziness      MEDICATIONS  (STANDING):  cefTRIAXone   IVPB      cefTRIAXone   IVPB 1 Gram(s) IV Intermittent every 24 hours  chlorhexidine 4% Liquid 1 Application(s) Topical <User Schedule>  cholestyramine Powder (Sugar-Free) 4 Gram(s) Oral daily  diphenoxylate/atropine 1 Tablet(s) Oral daily  docusate sodium 100 milliGRAM(s) Oral three times a day  enoxaparin Injectable 40 milliGRAM(s) SubCutaneous daily  furosemide    Tablet 40 milliGRAM(s) Oral daily  losartan 25 milliGRAM(s) Oral daily  mesalamine DR Capsule 1200 milliGRAM(s) Oral two times a day  metoprolol succinate ER 25 milliGRAM(s) Oral daily  pantoprazole    Tablet 40 milliGRAM(s) Oral before breakfast  predniSONE   Tablet 5 milliGRAM(s) Oral daily  senna 2 Tablet(s) Oral at bedtime    MEDICATIONS  (PRN):      Allergies    No Known Allergies    Intolerances        Vital Signs Last 24 Hrs  T(C): 36.2 (09 Sep 2018 20:48), Max: 36.2 (09 Sep 2018 20:48)  T(F): 97.1 (09 Sep 2018 20:48), Max: 97.1 (09 Sep 2018 20:48)  HR: 67 (09 Sep 2018 20:48) (63 - 74)  BP: 114/58 (09 Sep 2018 20:48) (108/56 - 125/59)  BP(mean): --  RR: 18 (09 Sep 2018 20:48) (18 - 18)  SpO2: 95% (09 Sep 2018 21:59) (95% - 95%)      Physical exam:  General: Awake, alert and oriented.  Normal mood and affect.  No acute distress noted.    Psych: Oriented Mood and affect appropriate. CN intact  Head: No icterus or palor. NO Xanthalasma or jaundice  Neck: No JVD.  No carotid bruit. No palpable thyroid  Cardiology: Heart regular rate and rhythm.   S1 S2, no murmurs, clicks or rubs.    Respiratory: Respirations even and unlabored.  Lungs clear to auscultation bilaterally.  No use of accessory muscles.    Abdomen: Abdomen soft, non-distended.  Bowel sounds positive in 4 quadrants.  No abdominal aneurysm felt  Extremities: No edema noted to lower extremities.  No clubbing or cyanosis.        LABS:                        13.8   7.70  )-----------( 259      ( 09 Sep 2018 09:28 )             43.4     09-09    140  |  95<L>  |  24<H>  ----------------------------<  249<H>  4.1   |  27  |  0.8    Ca    8.8      09 Sep 2018 09:28  Mg     2.1     09-09    TPro  5.9<L>  /  Alb  3.9  /  TBili  0.5  /  DBili  x   /  AST  12  /  ALT  21  /  AlkPhos  88  09-09          RADIOLOGY & ADDITIONAL TESTS:

## 2018-09-10 DIAGNOSIS — Z95.0 PRESENCE OF CARDIAC PACEMAKER: ICD-10-CM

## 2018-09-10 DIAGNOSIS — I50.42 CHRONIC COMBINED SYSTOLIC (CONGESTIVE) AND DIASTOLIC (CONGESTIVE) HEART FAILURE: ICD-10-CM

## 2018-09-10 DIAGNOSIS — I50.9 HEART FAILURE, UNSPECIFIED: ICD-10-CM

## 2018-09-10 DIAGNOSIS — I35.0 NONRHEUMATIC AORTIC (VALVE) STENOSIS: ICD-10-CM

## 2018-09-10 LAB
ALBUMIN SERPL ELPH-MCNC: 3.9 G/DL — SIGNIFICANT CHANGE UP (ref 3.5–5.2)
ALP SERPL-CCNC: 84 U/L — SIGNIFICANT CHANGE UP (ref 30–115)
ALT FLD-CCNC: 20 U/L — SIGNIFICANT CHANGE UP (ref 0–41)
ANION GAP SERPL CALC-SCNC: 17 MMOL/L — HIGH (ref 7–14)
AST SERPL-CCNC: 13 U/L — SIGNIFICANT CHANGE UP (ref 0–41)
BILIRUB SERPL-MCNC: 0.3 MG/DL — SIGNIFICANT CHANGE UP (ref 0.2–1.2)
BUN SERPL-MCNC: 31 MG/DL — HIGH (ref 10–20)
CALCIUM SERPL-MCNC: 8.1 MG/DL — LOW (ref 8.5–10.1)
CHLORIDE SERPL-SCNC: 95 MMOL/L — LOW (ref 98–110)
CO2 SERPL-SCNC: 27 MMOL/L — SIGNIFICANT CHANGE UP (ref 17–32)
CREAT SERPL-MCNC: 1 MG/DL — SIGNIFICANT CHANGE UP (ref 0.7–1.5)
CULTURE RESULTS: SIGNIFICANT CHANGE UP
GLUCOSE SERPL-MCNC: 283 MG/DL — HIGH (ref 70–99)
HCT VFR BLD CALC: 41.6 % — SIGNIFICANT CHANGE UP (ref 37–47)
HGB BLD-MCNC: 12.9 G/DL — SIGNIFICANT CHANGE UP (ref 12–16)
MAGNESIUM SERPL-MCNC: 2 MG/DL — SIGNIFICANT CHANGE UP (ref 1.8–2.4)
MCHC RBC-ENTMCNC: 27.7 PG — SIGNIFICANT CHANGE UP (ref 27–31)
MCHC RBC-ENTMCNC: 31 G/DL — LOW (ref 32–37)
MCV RBC AUTO: 89.5 FL — SIGNIFICANT CHANGE UP (ref 81–99)
NRBC # BLD: 0 /100 WBCS — SIGNIFICANT CHANGE UP (ref 0–0)
PLATELET # BLD AUTO: 239 K/UL — SIGNIFICANT CHANGE UP (ref 130–400)
POTASSIUM SERPL-MCNC: 3.8 MMOL/L — SIGNIFICANT CHANGE UP (ref 3.5–5)
POTASSIUM SERPL-SCNC: 3.8 MMOL/L — SIGNIFICANT CHANGE UP (ref 3.5–5)
PROT SERPL-MCNC: 5.7 G/DL — LOW (ref 6–8)
RBC # BLD: 4.65 M/UL — SIGNIFICANT CHANGE UP (ref 4.2–5.4)
RBC # FLD: 12.5 % — SIGNIFICANT CHANGE UP (ref 11.5–14.5)
SODIUM SERPL-SCNC: 139 MMOL/L — SIGNIFICANT CHANGE UP (ref 135–146)
SPECIMEN SOURCE: SIGNIFICANT CHANGE UP
WBC # BLD: 7.77 K/UL — SIGNIFICANT CHANGE UP (ref 4.8–10.8)
WBC # FLD AUTO: 7.77 K/UL — SIGNIFICANT CHANGE UP (ref 4.8–10.8)

## 2018-09-10 RX ADMIN — Medication 1200 MILLIGRAM(S): at 18:21

## 2018-09-10 RX ADMIN — Medication 1200 MILLIGRAM(S): at 05:21

## 2018-09-10 RX ADMIN — CHLORHEXIDINE GLUCONATE 1 APPLICATION(S): 213 SOLUTION TOPICAL at 05:22

## 2018-09-10 RX ADMIN — CEFTRIAXONE 100 GRAM(S): 500 INJECTION, POWDER, FOR SOLUTION INTRAMUSCULAR; INTRAVENOUS at 18:17

## 2018-09-10 RX ADMIN — LOSARTAN POTASSIUM 25 MILLIGRAM(S): 100 TABLET, FILM COATED ORAL at 05:20

## 2018-09-10 RX ADMIN — Medication 40 MILLIGRAM(S): at 05:21

## 2018-09-10 RX ADMIN — ENOXAPARIN SODIUM 40 MILLIGRAM(S): 100 INJECTION SUBCUTANEOUS at 12:09

## 2018-09-10 RX ADMIN — PANTOPRAZOLE SODIUM 40 MILLIGRAM(S): 20 TABLET, DELAYED RELEASE ORAL at 05:21

## 2018-09-10 RX ADMIN — Medication 100 MILLIGRAM(S): at 13:54

## 2018-09-10 RX ADMIN — Medication 100 MILLIGRAM(S): at 21:54

## 2018-09-10 RX ADMIN — Medication 25 MILLIGRAM(S): at 05:21

## 2018-09-10 RX ADMIN — CHOLESTYRAMINE 4 GRAM(S): 4 POWDER, FOR SUSPENSION ORAL at 10:44

## 2018-09-10 RX ADMIN — SENNA PLUS 2 TABLET(S): 8.6 TABLET ORAL at 21:55

## 2018-09-10 RX ADMIN — Medication 5 MILLIGRAM(S): at 05:21

## 2018-09-10 RX ADMIN — Medication 100 MILLIGRAM(S): at 05:22

## 2018-09-10 NOTE — PHYSICAL THERAPY INITIAL EVALUATION ADULT - PERTINENT HX OF CURRENT PROBLEM, REHAB EVAL
86 year old lady with PMHx ITP, diverticulitis, PPM presents after an episode of AMS a/w SOB, RAYA, orthopnea.

## 2018-09-10 NOTE — PHYSICAL THERAPY INITIAL EVALUATION ADULT - GAIT DEVIATIONS NOTED, PT EVAL
Decreased step length/height, excessive trunk flexion/decreased step length/decreased stride length/increased time in double stance/decreased andrey

## 2018-09-10 NOTE — PROGRESS NOTE ADULT - ASSESSMENT
86 y/0 fem admitted for chf altered mental status with treatment improved,  pt has PPM needs Battery Change  echo critical a/s, mod MR  ef 29%  severly debilitated state

## 2018-09-10 NOTE — CONSULT NOTE ADULT - SUBJECTIVE AND OBJECTIVE BOX
Surgeon: /Ez/ Johann    Consult requesting by: Dr. Shaikh    HISTORY OF PRESENT ILLNESS:  86y Female with PMH ITP, diverticulitis, CHF, PPM, presents after an episode of altered mental status secondary to positive UTI vs underlying dementia. Patient reports SOB, RAYA, orthopnea. As per family, this has been recurring for the past 2-3 weeks where the patient has been waking in a state of delirium and screaming out in distress and the episodes usually resolve after a few minutes. However, today this episode was much more prolonged, lasting almost an hour, so the family brought her to the ED for further evaluation. Of note, the patient was worked up by her PMD for possible neurologic etiology and was diagnosed with toxic metabolic encephalopathy. Denies chest pain, palpitations, abdominal pain, n/v/d/c, numbness, paresthesia, recent travel, or sick contacts. Patient was seen by EP who states she is not a candidate for TAVR due to low EF of 29% and will have battery replaced on Wed, 9/12/18.     In ED, T 96.7F HR 88 bp 147/62 90% on RA. CTHNC was done due to AMS which came back unremarkable. Patient found to be in CHF and required BiPAP which made her feel better with improvement of saturation and was given Lasix 40mg IV x1. BNP on admission was 57206. (04 Sep 2018 20:30)    NYHA functional class    [ ] Class I (no limitation) [ ] Class II (slight limitation) [ ] Class III (marked limitation) [ ] Class IV (symptoms at rest)    PAST MEDICAL & SURGICAL HISTORY:  Immune thrombocytopenia  Artificial cardiac pacemaker    MEDICATIONS  (STANDING):  cefTRIAXone   IVPB      cefTRIAXone   IVPB 1 Gram(s) IV Intermittent every 24 hours  chlorhexidine 4% Liquid 1 Application(s) Topical <User Schedule>  cholestyramine Powder (Sugar-Free) 4 Gram(s) Oral daily  diphenoxylate/atropine 1 Tablet(s) Oral daily  docusate sodium 100 milliGRAM(s) Oral three times a day  enoxaparin Injectable 40 milliGRAM(s) SubCutaneous daily  furosemide    Tablet 40 milliGRAM(s) Oral daily  losartan 25 milliGRAM(s) Oral daily  mesalamine DR Capsule 1200 milliGRAM(s) Oral two times a day  metoprolol succinate ER 25 milliGRAM(s) Oral daily  pantoprazole    Tablet 40 milliGRAM(s) Oral before breakfast  predniSONE   Tablet 5 milliGRAM(s) Oral daily  senna 2 Tablet(s) Oral at bedtime    MEDICATIONS  (PRN):    Antiplatelet therapy:                           Last dose/amt:     Allergies  No Known Allergies    Intolerances    SOCIAL HISTORY:  Smoker: [ ] Yes  [x ] No        PACK YEARS:                         WHEN QUIT?  ETOH use: [ ] Yes  [x ] No              FREQUENCY / QUANTITY:  Ilicit Drug use:  [ ] Yes  [x ] No  Occupation: unemployed  Lives with: family  Assisted device use:   5 meter walk test: 1____sec, 2____sec, 3___sec    FAMILY HISTORY:  No pertinent family history in first degree relatives    Review of Systems  CONSTITUTIONAL:  Fevers[ ] chills[ ] sweats[ ] fatigue[ ] weight loss[ ] weight gain [ ]                                     NEGATIVE [X ]   NEURO:  parathesias[ ] seizures [ ]  syncope [ ]  confusion [ ]                                                                       NEGATIVE[ X]   EYES: glasses[ ]  blurry vision[ ]  discharge[ ] pain[ ] glaucoma [ ]                                                                 NEGATIVE[X ]   ENMT:  difficulty hearing [ ]  vertigo[ ]  dysphagia[ ] epistaxis[ ] recent dental work [ ]                                    NEGATIVE[ X]   CV:  chest pain[ ] palpitations[ ] RAYA [x ] diaphoresis [ ]                                                                               NEGATIVE[ ]   RESPIRATORY:  wheezing[ ] SOB[x ] cough [ ] sputum[ ] hemoptysis[ ]                                                            NEGATIVE[ ]   GI:  nausea[ ]  vomiting [ ]  diarrhea[ ] constipation [ ] melena [ ]                                                                   NEGATIVE[ X]   : hematuria[ ]  dysuria[ ] urgency[ ] incontinence[ ]                                                                                   NEGATIVE[ X]   MUSCULOSKELETAL:  arthritis[ ]  joint swelling [ ] muscle weakness [ ] Hx vein stripping [ ]                              NEGATIVE[X ]   SKIN/BREAST:  rash[ ] itching [ ]  hair loss[ ] masses[ ]                                                                                  NEGATIVE[ X]   PSYCH:  dementia [x ] depression [ ] anxiety[ ]                                                                                             NEGATIVE[X ]   HEME/LYMPH:  bruises easily[ ] enlarged lymph nodes[ ] tender lymph nodes[ ]                                            NEGATIVE[ X]   ENDOCRINE:  cold intolerance[ ] heat intolerance[ ] polydipsia[ ]                                                                  NEGATIVE[ X]     PHYSICAL EXAM  Vital Signs Last 24 Hrs  T(C): 36.1 (10 Sep 2018 12:58), Max: 36.2 (09 Sep 2018 20:48)  T(F): 97 (10 Sep 2018 12:58), Max: 97.1 (09 Sep 2018 20:48)  HR: 66 (10 Sep 2018 12:58) (66 - 69)  BP: 101/53 (10 Sep 2018 12:58) (101/53 - 129/61)  BP(mean): --  RR: 18 (10 Sep 2018 12:58) (18 - 18)  SpO2: 95% (09 Sep 2018 21:59) (95% - 95%)  Right arm bp: Left arm bp;    CONSTITUTIONAL: WNL[ ] Other: Patient appears to be alert and awake, sitting in chair comfortably but confused.  Neuro: WNL[ ] Normal exam oriented to person/place & time with no focal motor or sensory deficits. Other: AAOx1                     Eyes: WNL[x ]   Normal exam of conjunctiva & lids, pupils equally reactive. Other     ENT: WNL[x ]    Normal exam of nasal/oral mucosa with absence of cyanosis. Other  Neck: WNL[x ]  Normal exam of jugular veins, trachea & thyroid. Other  Chest: WNL[x ] Normal lung exam with good air movement absence of wheezes, rales, or rhonchi: Other                                                                                CV:  Auscultation: normal [x ] S3[ ] S4[ ] Irregular [ ] Rub[ ] Clicks[ ]    Murmurs none:[ ]systolic [x ]  diastolic [ ] holosystolic [ ]  Carotids: No Bruits[x ] Other                 Abdominal Aorta: normal [ x] nonpalpable[ ] Other                                                                                      GI:           WNL[ x] Normal exam of abdomen, liver & spleen with no noted masses or tenderness. Other                                                                                                        Extremities: WNL[ ] Normal no evidence of cyanosis or deformity Edema: none[ ] trace[ ] 1+[ ] 2+[ ] 3+[ x] 4+[ ]  Lower Extremity Pulses: Right[ x] Left[x ]Varicosities[ x]  SKIN: WNL [ ] Normal exam to inspection & palpation. Other: Cool to touch, dry                                                          LABS:                        12.9   7.77  )-----------( 239      ( 10 Sep 2018 09:42 )             41.6     09-10    139  |  95<L>  |  31<H>  ----------------------------<  283<H>  3.8   |  27  |  1.0    Ca    8.1<L>      10 Sep 2018 09:42  Mg     2.0     09-10    TPro  5.7<L>  /  Alb  3.9  /  TBili  0.3  /  DBili  x   /  AST  13  /  ALT  20  /  AlkPhos  84  09-10      Cardiac Cath:     TTE / MARJORIE: < from: Transthoracic Echocardiogram (09.06.18 @ 12:21) >  Summary:   1. Severely decreased global left ventricular systolic function.   2. Moderate to severe left atrial enlargement.   3. LV Ejection Fraction by Simmons's Method with a biplane EF of 29 %.   4. Spectral Doppler shows impaired relaxation pattern of left   ventricular myocardial filling (Grade I diastolic dysfunction).   5. Moderate pleural effusion in the left lateral region.   6. Moderate mitral valve regurgitation.   7. Mild-moderate tricuspid regurgitation.   8. Mild aortic regurgitation.   9. Peak transaortic gradient is 66.2 mmHg, mean transaortic gradient   equals 30.8 mmHg, the calculated aortic valve area equals 0.62 cm² by the   continuity equation consistent with severe aortic stenosis.    < end of copied text >      Recommendation: (Procedures/Evaluations)  CT HEAD Non-Contrast: [  ]  CT Chest with/without contrast [ ]  Carotid Duplex: [ ]  KENNY/PVR: [ ]  PFT : Simple PFT [x ]  Full [ ]  Renal Consult [ ]  Pulmonary Consult: [ ]   Vascular Consult [ ]    Dental Consult [ ]   Hem-Onc Consult [ ]   GI Consult [ ]   Other Consultations: Neuro Consult,     STS Score:     Impression:      CAD [x]  Valvular  disease [ ]   Aortic Disease [ ]   ROMIE: Yes[ ] No [ ]   CKD Stage I [ ] , Stage II [ ] , Stage III [ ], Stage IV [ ]   Anemia: Yes [ ], No [x]  Diabetes :Yes [ ], No [x]  Acute MI: Yes [ ], No [ ]   Heart Failure: Yes [x] , No [ ] HFpEF [ ], HFrEF [ ]        Assessment/ Plan:  85 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    - PPM: needs replacement  - Severe aortic stenosis: possible TAVR  - CHF: stabilized, continue current medical therapies  - UTI: continue Rocephin  - Follow up neuro  - Recommend ID consult as per EP  - Surgeon: /Ez/ Johann    Consult requesting by: Dr. Shaikh    HISTORY OF PRESENT ILLNESS:  86y Female with PMH ITP, diverticulitis, CHF, PPM, presents after an episode of altered mental status secondary to positive UTI vs underlying dementia. Patient reports SOB, RAYA, orthopnea. As per family, this has been recurring for the past 2-3 weeks where the patient has been waking in a state of delirium and screaming out in distress and the episodes usually resolve after a few minutes. However, today this episode was much more prolonged, lasting almost an hour, so the family brought her to the ED for further evaluation. Of note, the patient was worked up by her PMD for possible neurologic etiology and was diagnosed with toxic metabolic encephalopathy. Denies chest pain, palpitations, abdominal pain, n/v/d/c, numbness, paresthesia, recent travel, or sick contacts. Patient was seen by EP who states she is not a candidate for TAVR due to low EF of 29% and will have battery replaced on Wed, 9/12/18.     In ED, T 96.7F HR 88 bp 147/62 90% on RA. CTHNC was done due to AMS which came back unremarkable. Patient found to be in CHF and required BiPAP which made her feel better with improvement of saturation and was given Lasix 40mg IV x1. BNP on admission was [id number]. (04 Sep 2018 20:30)    NYHA functional class    [ ] Class I (no limitation) [ ] Class II (slight limitation) [ ] Class III (marked limitation) [ ] Class IV (symptoms at rest)    PAST MEDICAL & SURGICAL HISTORY:  Immune thrombocytopenia  Artificial cardiac pacemaker    MEDICATIONS  (STANDING):  cefTRIAXone   IVPB      cefTRIAXone   IVPB 1 Gram(s) IV Intermittent every 24 hours  chlorhexidine 4% Liquid 1 Application(s) Topical <User Schedule>  cholestyramine Powder (Sugar-Free) 4 Gram(s) Oral daily  diphenoxylate/atropine 1 Tablet(s) Oral daily  docusate sodium 100 milliGRAM(s) Oral three times a day  enoxaparin Injectable 40 milliGRAM(s) SubCutaneous daily  furosemide    Tablet 40 milliGRAM(s) Oral daily  losartan 25 milliGRAM(s) Oral daily  mesalamine DR Capsule 1200 milliGRAM(s) Oral two times a day  metoprolol succinate ER 25 milliGRAM(s) Oral daily  pantoprazole    Tablet 40 milliGRAM(s) Oral before breakfast  predniSONE   Tablet 5 milliGRAM(s) Oral daily  senna 2 Tablet(s) Oral at bedtime    MEDICATIONS  (PRN):    Antiplatelet therapy:                           Last dose/amt:     Allergies  No Known Allergies    Intolerances    SOCIAL HISTORY:  Smoker: [ ] Yes  [x ] No        PACK YEARS:                         WHEN QUIT?  ETOH use: [ ] Yes  [x ] No              FREQUENCY / QUANTITY:  Ilicit Drug use:  [ ] Yes  [x ] No  Occupation: unable to obtain 2/2 AMS  Lives with: unable to obtain 2/2 AMS  Assisted device use:   5 meter walk test: 1____sec, 2____sec, 3___sec    FAMILY HISTORY:  No pertinent family history in first degree relatives    Review of Systems  CONSTITUTIONAL:  Fevers[ ] chills[ ] sweats[ ] fatigue[ ] weight loss[ ] weight gain [ ]                                     NEGATIVE [X ]   NEURO:  parathesias[ ] seizures [ ]  syncope [ ]  confusion [ ]                                                                       NEGATIVE[ X]   EYES: glasses[ ]  blurry vision[ ]  discharge[ ] pain[ ] glaucoma [ ]                                                                 NEGATIVE[X ]   ENMT:  difficulty hearing [ ]  vertigo[ ]  dysphagia[ ] epistaxis[ ] recent dental work [ ]                                    NEGATIVE[ X]   CV:  chest pain[ ] palpitations[ ] RAYA [x ] diaphoresis [ ]                                                                               NEGATIVE[ ]   RESPIRATORY:  wheezing[ ] SOB[x ] cough [ ] sputum[ ] hemoptysis[ ]                                                            NEGATIVE[ ]   GI:  nausea[ ]  vomiting [ ]  diarrhea[ ] constipation [ ] melena [ ]                                                                   NEGATIVE[ X]   : hematuria[ ]  dysuria[ ] urgency[ ] incontinence[ ]                                                                                   NEGATIVE[ X]   MUSCULOSKELETAL:  arthritis[ ]  joint swelling [ ] muscle weakness [ ] Hx vein stripping [ ]                              NEGATIVE[X ]   SKIN/BREAST:  rash[ ] itching [ ]  hair loss[ ] masses[ ]                                                                                  NEGATIVE[ X]   PSYCH:  dementia [x ] depression [ ] anxiety[ ]                                                                                             NEGATIVE[X ]   HEME/LYMPH:  bruises easily[ ] enlarged lymph nodes[ ] tender lymph nodes[ ]                                            NEGATIVE[ X]   ENDOCRINE:  cold intolerance[ ] heat intolerance[ ] polydipsia[ ]                                                                  NEGATIVE[ X]     PHYSICAL EXAM  Vital Signs Last 24 Hrs  T(C): 36.1 (10 Sep 2018 12:58), Max: 36.2 (09 Sep 2018 20:48)  T(F): 97 (10 Sep 2018 12:58), Max: 97.1 (09 Sep 2018 20:48)  HR: 66 (10 Sep 2018 12:58) (66 - 69)  BP: 101/53 (10 Sep 2018 12:58) (101/53 - 129/61)  BP(mean): --  RR: 18 (10 Sep 2018 12:58) (18 - 18)  SpO2: 95% (09 Sep 2018 21:59) (95% - 95%)  Right arm bp: Left arm bp;    CONSTITUTIONAL: WNL[ ] Other: Patient appears to be alert and awake, sitting in chair comfortably but confused.  Neuro: WNL[ ] Normal exam oriented to person/place & time with no focal motor or sensory deficits. Other: AAOx1                     Eyes: WNL[x ]   Normal exam of conjunctiva & lids, pupils equally reactive. Other     ENT: WNL[x ]    Normal exam of nasal/oral mucosa with absence of cyanosis. Other  Neck: WNL[x ]  Normal exam of jugular veins, trachea & thyroid. Other  Chest: WNL[x ] Normal lung exam with good air movement absence of wheezes, rales, or rhonchi: Other                                                                                CV:  Auscultation: normal [x ] S3[ ] S4[ ] Irregular [ ] Rub[ ] Clicks[ ]    Murmurs none:[ ]systolic [x ]  diastolic [ ] holosystolic [ ]  Carotids: No Bruits[x ] Other                 Abdominal Aorta: normal [ x] nonpalpable[ ] Other                                                                                      GI:           WNL[ x] Normal exam of abdomen, liver & spleen with no noted masses or tenderness. Other                                                                                                        Extremities: WNL[ ] Normal no evidence of cyanosis or deformity Edema: none[ ] trace[ ] 1+[ ] 2+[ ] 3+[ x] 4+[ ]  Lower Extremity Pulses: Right[ x] Left[x ]Varicosities[ x]  SKIN: WNL [ ] Normal exam to inspection & palpation. Other: Cool to touch, dry                                                          LABS:                        12.9   7.77  )-----------( 239      ( 10 Sep 2018 09:42 )             41.6     09-10    139  |  95<L>  |  31<H>  ----------------------------<  283<H>  3.8   |  27  |  1.0    Ca    8.1<L>      10 Sep 2018 09:42  Mg     2.0     09-10    TPro  5.7<L>  /  Alb  3.9  /  TBili  0.3  /  DBili  x   /  AST  13  /  ALT  20  /  AlkPhos  84  09-10      Cardiac Cath:     TTE / MARJORIE: < from: Transthoracic Echocardiogram (09.06.18 @ 12:21) >  Summary:   1. Severely decreased global left ventricular systolic function.   2. Moderate to severe left atrial enlargement.   3. LV Ejection Fraction by Simmons's Method with a biplane EF of 29 %.   4. Spectral Doppler shows impaired relaxation pattern of left   ventricular myocardial filling (Grade I diastolic dysfunction).   5. Moderate pleural effusion in the left lateral region.   6. Moderate mitral valve regurgitation.   7. Mild-moderate tricuspid regurgitation.   8. Mild aortic regurgitation.   9. Peak transaortic gradient is 66.2 mmHg, mean transaortic gradient   equals 30.8 mmHg, the calculated aortic valve area equals 0.62 cm² by the   continuity equation consistent with severe aortic stenosis.    < end of copied text >      Recommendation: (Procedures/Evaluations)  CT HEAD Non-Contrast: [  ]  CT Chest with/without contrast [ ]  Carotid Duplex: [ ]  KENNY/PVR: [ ]  PFT : Simple PFT [x ]  Full [ ]  Renal Consult [ ]  Pulmonary Consult: [ ]   Vascular Consult [ ]    Dental Consult [ ]   Hem-Onc Consult [ ]   GI Consult [ ]   Other Consultations: Neuro Consult,     STS Score:     Impression:    CAD []  Valvular  disease [x ]   Aortic Disease [x ]   ROMIE: Yes[ ] No [x ]   CKD Stage I [ ] , Stage II [ ] , Stage III [ ], Stage IV [ ]   Anemia: Yes [ ], No [x]  Diabetes :Yes [ ], No [x]  Acute MI: Yes [ ], No [ x]   Heart Failure: Yes [x] , No [ ] HFpEF [ ], HFrEF [ ]        Assessment/ Plan:  85 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    - PPM: needs replacement, scheduled for Wednesday 9/12/18  - Severe aortic stenosis: possible TAVR evaluation  - CHF: stabilized, continue current medical therapies  - UTI: continue Rocephin  - Follow up neuro  - Recommend ID consult as per EP  - Surgeon: /Ez/ Johann    Consult requesting by: Dr. Shaikh    HISTORY OF PRESENT ILLNESS:  86y Female with PMH ITP, diverticulitis, CHF, PPM, presents after an episode of altered mental status secondary to positive UTI vs underlying dementia. Patient reports SOB, RAYA, orthopnea. As per family, this has been recurring for the past 2-3 weeks where the patient has been waking in a state of delirium and screaming out in distress and the episodes usually resolve after a few minutes. However, today this episode was much more prolonged, lasting almost an hour, so the family brought her to the ED for further evaluation. Ct head was negative for acute pathologic changes. Patient denies chest pain, palpitations, abdominal pain, n/v/d/c, numbness, paresthesia, recent travel, or sick contacts. Patient was found to have Ecoli in urine and is currently being treated with Rocephin. Patient has PPM which needs batter change this week 9/12/2018.     2d Echo  showed critical AS with BASIM 0.62cm2, peak grad 66.2 mmHG, and mean gradient of 30.8mmHg. Cardiac surgery called for evaluation of AS.     NYHA functional class    [ ] Class I (no limitation) [ ] Class II (slight limitation) [ ] Class III (marked limitation) [ x] Class IV (symptoms at rest)    PAST MEDICAL & SURGICAL HISTORY:  Immune thrombocytopenia  Artificial cardiac pacemaker    MEDICATIONS  (STANDING):  cefTRIAXone   IVPB      cefTRIAXone   IVPB 1 Gram(s) IV Intermittent every 24 hours  chlorhexidine 4% Liquid 1 Application(s) Topical <User Schedule>  cholestyramine Powder (Sugar-Free) 4 Gram(s) Oral daily  diphenoxylate/atropine 1 Tablet(s) Oral daily  docusate sodium 100 milliGRAM(s) Oral three times a day  enoxaparin Injectable 40 milliGRAM(s) SubCutaneous daily  furosemide    Tablet 40 milliGRAM(s) Oral daily  losartan 25 milliGRAM(s) Oral daily  mesalamine DR Capsule 1200 milliGRAM(s) Oral two times a day  metoprolol succinate ER 25 milliGRAM(s) Oral daily  pantoprazole    Tablet 40 milliGRAM(s) Oral before breakfast  predniSONE   Tablet 5 milliGRAM(s) Oral daily  senna 2 Tablet(s) Oral at bedtime    MEDICATIONS  (PRN):    Antiplatelet therapy:                           Last dose/amt:     Allergies  No Known Allergies    Intolerances    SOCIAL HISTORY:  Smoker: [ ] Yes  [x ] No        PACK YEARS:                         WHEN QUIT?  ETOH use: [ ] Yes  [x ] No              FREQUENCY / QUANTITY:  Ilicit Drug use:  [ ] Yes  [x ] No  Occupation: unable to obtain 2/2 AMS  Lives with: unable to obtain 2/2 AMS  Assisted device use:   5 meter walk test: 1____sec, 2____sec, 3___sec    FAMILY HISTORY:  No pertinent family history in first degree relatives    Review of Systems  CONSTITUTIONAL:  Fevers[ ] chills[ ] sweats[ ] fatigue[ ] weight loss[ ] weight gain [ ]                                     NEGATIVE [X ]   NEURO:  parathesias[ ] seizures [ ]  syncope [ ]  confusion [ ]                                                                       NEGATIVE[ X]   EYES: glasses[ ]  blurry vision[ ]  discharge[ ] pain[ ] glaucoma [ ]                                                                 NEGATIVE[X ]   ENMT:  difficulty hearing [ ]  vertigo[ ]  dysphagia[ ] epistaxis[ ] recent dental work [ ]                                    NEGATIVE[ X]   CV:  chest pain[ ] palpitations[ ] RAYA [x ] diaphoresis [ ]                                                                               NEGATIVE[ ]   RESPIRATORY:  wheezing[ ] SOB[x ] cough [ ] sputum[ ] hemoptysis[ ]                                                            NEGATIVE[ ]   GI:  nausea[ ]  vomiting [ ]  diarrhea[ ] constipation [ ] melena [ ]                                                                   NEGATIVE[ X]   : hematuria[ ]  dysuria[ ] urgency[ ] incontinence[ ]                                                                                   NEGATIVE[ X]   MUSCULOSKELETAL:  arthritis[ ]  joint swelling [ ] muscle weakness [ ] Hx vein stripping [ ]                              NEGATIVE[X ]   SKIN/BREAST:  rash[ ] itching [ ]  hair loss[ ] masses[ ]                                                                                  NEGATIVE[ X]   PSYCH:  dementia [x ] depression [ ] anxiety[ ]                                                                                             NEGATIVE[X ]   HEME/LYMPH:  bruises easily[ ] enlarged lymph nodes[ ] tender lymph nodes[ ]                                            NEGATIVE[ X]   ENDOCRINE:  cold intolerance[ ] heat intolerance[ ] polydipsia[ ]                                                                  NEGATIVE[ X]     PHYSICAL EXAM  Vital Signs Last 24 Hrs  T(C): 36.1 (10 Sep 2018 12:58), Max: 36.2 (09 Sep 2018 20:48)  T(F): 97 (10 Sep 2018 12:58), Max: 97.1 (09 Sep 2018 20:48)  HR: 66 (10 Sep 2018 12:58) (66 - 69)  BP: 101/53 (10 Sep 2018 12:58) (101/53 - 129/61)  RR: 18 (10 Sep 2018 12:58) (18 - 18)  SpO2: 95% (09 Sep 2018 21:59) (95% - 95%)  Right arm bp: Left arm bp;    CONSTITUTIONAL: WNL[ x] Other: Patient appears to be alert and awake, sitting in chair comfortably but confused.  Neuro: WNL[x ] Normal exam oriented to person/place & time with no focal motor or sensory deficits. Other: AAOx1                     Eyes: WNL[x ]   Normal exam of conjunctiva & lids, pupils equally reactive. Other     ENT: WNL[x ]    Normal exam of nasal/oral mucosa with absence of cyanosis. Other  Neck: WNL[x ]  Normal exam of jugular veins, trachea & thyroid. Other  Chest: WNL[x ] Normal lung exam with good air movement absence of wheezes, rales, or rhonchi: Other                                                                                CV:  Auscultation: normal [x ] S3[ ] S4[ ] Irregular [ ] Rub[ ] Clicks[ ]    Murmurs none:[ ]systolic [x ]  diastolic [ ] holosystolic [ ]  Carotids: No Bruits[x ] Other                 Abdominal Aorta: normal [ x] nonpalpable[ ] Other                                                                                      GI:           WNL[ x] Normal exam of abdomen, liver & spleen with no noted masses or tenderness. Other                                                                                                        Extremities: WNL[ ] Normal no evidence of cyanosis or deformity Edema: none[ ] trace[ ] 1+[ ] 2+[ ] 3+[ x] 4+[ ]  Lower Extremity Pulses: Right[ x] Left[x ]Varicosities[ x]  SKIN: WNL [ ] Normal exam to inspection & palpation. Other: Cool to touch, dry                                                          LABS:                        12.9   7.77  )-----------( 239      ( 10 Sep 2018 09:42 )             41.6     09-10    139  |  95<L>  |  31<H>  ----------------------------<  283<H>  3.8   |  27  |  1.0    Ca    8.1<L>      10 Sep 2018 09:42  Mg     2.0     09-10    TPro  5.7<L>  /  Alb  3.9  /  TBili  0.3  /  DBili  x   /  AST  13  /  ALT  20  /  AlkPhos  84  09-10      Cardiac Cath:     TTE / MARJORIE: < from: Transthoracic Echocardiogram (09.06.18 @ 12:21) >  Summary:   1. Severely decreased global left ventricular systolic function.   2. Moderate to severe left atrial enlargement.   3. LV Ejection Fraction by Simmons's Method with a biplane EF of 29 %.   4. Spectral Doppler shows impaired relaxation pattern of left   ventricular myocardial filling (Grade I diastolic dysfunction).   5. Moderate pleural effusion in the left lateral region.   6. Moderate mitral valve regurgitation.   7. Mild-moderate tricuspid regurgitation.   8. Mild aortic regurgitation.   9. Peak transaortic gradient is 66.2 mmHg, mean transaortic gradient   equals 30.8 mmHg, the calculated aortic valve area equals 0.62 cm² by the   continuity equation consistent with severe aortic stenosis.    < end of copied text >      Recommendation: (Procedures/Evaluations)  CT HEAD Non-Contrast: [  ]  CT Chest with/without contrast [ ]  Carotid Duplex: [ ]  KENNY/PVR: [ ]  PFT : Simple PFT [x ]  Full [ ]  Renal Consult [ ]  Pulmonary Consult: [ ]   Vascular Consult [ ]    Dental Consult [ ]   Hem-Onc Consult [ ]   GI Consult [ ]   Other Consultations: Neuro Consult,     STS Score: Risk of Mortality: 8.373%    Morbidity or Mortality: 35.958%    Long Length of Stay: 19.9%    Short Length of Stay: 9.442%    Permanent Stroke: 2.855%    Prolonged Ventilation: 27.861%    DSW Infection: 0.338%    Renal Failure: 9.122%    Reoperation: 12.842%    Impression:    CAD []  Valvular disease [x ]   Aortic Disease [ ]   ROMIE: Yes[ ] No [x ]   CKD Stage I [ ] , Stage II [ ] , Stage III [ ], Stage IV [ ]   Anemia: Yes [ ], No [x]  Diabetes :Yes [ ], No [x]  Acute MI: Yes [ ], No [ x]   Heart Failure: Yes [x] , No [ ] HFpEF [ ], HFrEF [x ]        Assessment/ Plan:  85 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    - PPM: needs replacement, scheduled for Wednesday 9/12/18  - Severe aortic stenosis: will discuss case with TAVR team and evaluate patient for possible TAVR  - CHF: stabilized, continue current medical therapies  - UTI: continue Rocephin  - Follow up neuro  - Recommend ID consult as per EP

## 2018-09-10 NOTE — PROGRESS NOTE ADULT - SUBJECTIVE AND OBJECTIVE BOX
SHELLEY PETIT  86y Female    CHIEF COMPLAINT:    Patient is a 86y old  Female who presents with a chief complaint of CHF,CRITICAL AORTIC STENOSIS, PPM, (10 Sep 2018 07:38)      INTERVAL HPI/OVERNIGHT EVENTS:    Patient seen and examined. No sob. Waiting for battery change. On IV abx for UTI. No palpitations.    ROS: All other systems are negative.    Vital Signs:    T(F): 96.7 (09-10-18 @ 05:41), Max: 97.1 (18 @ 20:48)  HR: 69 (09-10-18 @ 05:41) (63 - 69)  BP: 129/61 (09-10-18 @ 05:41) (108/56 - 129/61)  RR: 18 (09-10-18 @ 05:41) (18 - 18)  SpO2: 95% (18 @ 21:59) (95% - 95%)  I&O's Summary    09 Sep 2018 07:01  -  10 Sep 2018 07:00  --------------------------------------------------------  IN: 380 mL / OUT: 950 mL / NET: -570 mL      Daily     Daily Weight in k.7 (10 Sep 2018 05:41)  CAPILLARY BLOOD GLUCOSE          PHYSICAL EXAM:    GENERAL:  NAD  SKIN: No rashes or lesions  HENT: Atrumatic. Normocephalic. PERRL. Moist membranes.  NECK: Supple, No JVD. No lymphadenopathy.  PULMONARY: BS are decreased in the bases B/L. No wheezing. No rales  CVS: Normal S1, S2. Rate and Rythm are regular. A IV/VI syst mm over aortic and mitral areas.  ABDOMEN/GI: Soft, Nontender, Nondistended; BS present  EXTREMITIES: Peripheral pulses intact. No edema B/L LE.  NEUROLOGIC:  No motor or sensory deficit.  PSYCH: Alert & oriented x 0    Consultant(s) Notes Reviewed:  [x ] YES  [ ] NO  Care Discussed with Consultants/Other Providers [ x] YES  [ ] NO    EKG reviewed  Telemetry reviewed    LABS:                        12.9   7.77  )-----------( 239      ( 10 Sep 2018 09:42 )             41.6     09-10    139  |  95<L>  |  31<H>  ----------------------------<  283<H>  3.8   |  27  |  1.0    Ca    8.1<L>      10 Sep 2018 09:42  Mg     2.0     09-10    TPro  5.7<L>  /  Alb  3.9  /  TBili  0.3  /  DBili  x   /  AST  13  /  ALT  20  /  AlkPhos  84  -10      Serum Pro-Brain Natriuretic Peptide: 69954 pg/mL (18 @ 13:33)          RADIOLOGY & ADDITIONAL TESTS:      Imaging or report Personally Reviewed:  [ ] YES  [ ] NO    Medications:  Standing  cefTRIAXone   IVPB      cefTRIAXone   IVPB 1 Gram(s) IV Intermittent every 24 hours  chlorhexidine 4% Liquid 1 Application(s) Topical <User Schedule>  cholestyramine Powder (Sugar-Free) 4 Gram(s) Oral daily  diphenoxylate/atropine 1 Tablet(s) Oral daily  docusate sodium 100 milliGRAM(s) Oral three times a day  enoxaparin Injectable 40 milliGRAM(s) SubCutaneous daily  furosemide    Tablet 40 milliGRAM(s) Oral daily  losartan 25 milliGRAM(s) Oral daily  mesalamine DR Capsule 1200 milliGRAM(s) Oral two times a day  metoprolol succinate ER 25 milliGRAM(s) Oral daily  pantoprazole    Tablet 40 milliGRAM(s) Oral before breakfast  predniSONE   Tablet 5 milliGRAM(s) Oral daily  senna 2 Tablet(s) Oral at bedtime    PRN Meds      Case discussed with resident    Care discussed with pt/family

## 2018-09-10 NOTE — CONSULT NOTE ADULT - ATTENDING COMMENTS
reviewed case with heart team. Not a candidate for TAVR
Patient seen and examined.     Chart and data reviewed at length.    Patient is an 87 yo F with a history of SSS s/p PPM (2006) who presented with AMS in the setting of a UTI and was also found to have acute systolic heart failure exacerbation. The patient appears to have a newly depressed EF and she is not on optimum medications for heart failure. She will need further evaluation from a cardiac standpoint to understand the etiology of her heart failure and should have an ischemic work up. In addition, the patient has not had her device interrogated in over 2 years.     Recommendation:  - Check TSH.  - Ischemic work up.  - Start ACEi if BP allows. Start BB once heart failure has been treated.   - Patient definitely needs a generator change but needs to complete treatment for UTI first and will need an ID consult. She  may qualify for an ICD if she shows compliance with follow up as she was not seeing anyone for her device for the last 2 years.   - Replete lytes to keep K>4 and Mg>2.    Itzel Eller MD  Electrophysiology Attending

## 2018-09-10 NOTE — PROGRESS NOTE ADULT - SUBJECTIVE AND OBJECTIVE BOX
Patient is doing well, no longer has any shortness of breath. Tolerating PO intake and has no other complaints.      PHYSICAL EXAM:T(C): 36.1 (09-10-18 @ 12:58), Max: 36.2 (09-09-18 @ 20:48)  HR: 66 (09-10-18 @ 12:58) (66 - 69)  BP: 101/53 (09-10-18 @ 12:58) (101/53 - 129/61)  RR: 18 (09-10-18 @ 12:58) (18 - 18)  SpO2: 95% (09-09-18 @ 21:59) (95% - 95%)  GENERAL: NAD, well-developed  HEAD:  Atraumatic, Normocephalic  EYES: EOMI, PERRLA, conjunctiva and sclera clear  NECK: Supple, No JVD  CHEST/LUNG: Clear to auscultation bilaterally; No wheeze  HEART: Systolic murmur at left upper sternal border  ABDOMEN: Soft, Nontender, Nondistended; Bowel sounds present  EXTREMITIES:  2+ Peripheral Pulses, No clubbing, cyanosis, or edema  PSYCH: AAOx3  NEUROLOGY: non-focal  SKIN: No rashes or lesions                          12.9   7.77  )-----------( 239      ( 10 Sep 2018 09:42 )             41.6       09-10    139  |  95<L>  |  31<H>  ----------------------------<  283<H>  3.8   |  27  |  1.0    Ca    8.1<L>      10 Sep 2018 09:42  Mg     2.0     09-10    TPro  5.7<L>  /  Alb  3.9  /  TBili  0.3  /  DBili  x   /  AST  13  /  ALT  20  /  AlkPhos  84  09-10

## 2018-09-10 NOTE — PROGRESS NOTE ADULT - SUBJECTIVE AND OBJECTIVE BOX
Patient is a 86y old  Female who presents with a chief complaint of AMS (09 Sep 2018 22:14)      HPI:  86 year old lady with PMHx ITP, diverticulitis, PPM presents after an episode of AMS a/w SOB, RAYA, orthopnea. As per family, this has been recurring for the past 2-3 weeks where the patient has been waking in a state of delirium and screaming out in distress and the episodes usually resolve after a few minutes. However, today this episode was much more prolonged, lasting almost an hour, so the family brought her to the ED for further evaluation. Of note, the patient was worked up by her PMD for possible neurologic etiology and it has been negative thus far. She has not had a chance to see her neurologist yet. Denies cp, palpitations, abd pain, n/v/d/c, numbness, paresthesia, recent travel, sick contacts. Admits to SOB, ELIZABETH, RAYA, orthopnea.    In ED, T 96.7F HR 88 bp 147/62 90% on RA. CTHNC was done due to AMS which came back unremarkable. Patient found to be in CHF and required BiPAP which made her feel better with improvement of saturation and was given lasix 40mg IV x1. BNP on admission was 81107. (04 Sep 2018 20:30)      PAST MEDICAL & SURGICAL HISTORY:  Immune thrombocytopenia  Artificial cardiac pacemaker            ECHO  FINDINGS:  critical aortic stenosis, severe lv dysf, ef 29%    MEDICATIONS  (STANDING):  cefTRIAXone   IVPB      cefTRIAXone   IVPB 1 Gram(s) IV Intermittent every 24 hours  chlorhexidine 4% Liquid 1 Application(s) Topical <User Schedule>  cholestyramine Powder (Sugar-Free) 4 Gram(s) Oral daily  diphenoxylate/atropine 1 Tablet(s) Oral daily  docusate sodium 100 milliGRAM(s) Oral three times a day  enoxaparin Injectable 40 milliGRAM(s) SubCutaneous daily  furosemide    Tablet 40 milliGRAM(s) Oral daily  losartan 25 milliGRAM(s) Oral daily  mesalamine DR Capsule 1200 milliGRAM(s) Oral two times a day  metoprolol succinate ER 25 milliGRAM(s) Oral daily  pantoprazole    Tablet 40 milliGRAM(s) Oral before breakfast  predniSONE   Tablet 5 milliGRAM(s) Oral daily  senna 2 Tablet(s) Oral at bedtime    MEDICATIONS  (PRN):  as above    FAMILY HISTORY:  No pertinent family history in first degree relatives          REVIEW OF SYSTEMS      General:	  weak mostly in bed  Skin/Breast:  	  Ophthalmologic:  	  ENMT:	  neg  Respiratory and Thorax:  	no cough  Cardiovascular:	  no chest pain  Gastrointestinal:	  neg  Genitourinary:	    Musculoskeletal:	    Neurological:	  no deficit  Psychiatric:	    Hematology/Lymphatics:	    Endocrine:	  dm  Allergic/Immunologic:	  SOCIAL HISTORY:  neg  CIGARETTES:    ALCOHOL:  Vital Signs Last 24 Hrs  T(C): 35.9 (10 Sep 2018 05:41), Max: 36.2 (09 Sep 2018 20:48)  T(F): 96.7 (10 Sep 2018 05:41), Max: 97.1 (09 Sep 2018 20:48)  HR: 69 (10 Sep 2018 05:41) (63 - 69)  BP: 129/61 (10 Sep 2018 05:41) (108/56 - 129/61)  BP(mean): --  RR: 18 (10 Sep 2018 05:41) (18 - 18)  SpO2: 95% (09 Sep 2018 21:59) (95% - 95%)    Physical exam   MS: awake alert, follow commands   HEENT: PEERLA  CHEST: b/l breaths sounds, clear  Cardio: systolic ejectio mur mur  abdomen: soft  extremities: No edema, pallor or cyanosis    no edema  ECG:  paced rhythm  I&O's Detail    09 Sep 2018 07:01  -  10 Sep 2018 07:00  --------------------------------------------------------  IN:    Oral Fluid: 380 mL  Total IN: 380 mL    OUT:    Voided: 950 mL  Total OUT: 950 mL    Total NET: -570 mL          LABS:                        13.8   7.70  )-----------( 259      ( 09 Sep 2018 09:28 )             43.4     09-09    140  |  95<L>  |  24<H>  ----------------------------<  249<H>  4.1   |  27  |  0.8    Ca    8.8      09 Sep 2018 09:28  Mg     2.1     09-09    TPro  5.9<L>  /  Alb  3.9  /  TBili  0.5  /  DBili  x   /  AST  12  /  ALT  21  /  AlkPhos  88  09-09            I&O's Summary    09 Sep 2018 07:01  -  10 Sep 2018 07:00  --------------------------------------------------------  IN: 380 mL / OUT: 950 mL / NET: -570 mL      BNP  RADIOLOGY & ADDITIONAL STUDIES:  mild rt pl effusion  Assesment/ Plan

## 2018-09-10 NOTE — PROGRESS NOTE ADULT - ASSESSMENT
86 year old lady with PMHx ITP, diverticulitis, PPM presents after an episode of AMS a/w SOB, RAYA, orthopnea. As per family, this has been recurring for the past 2-3 weeks where the patient has been waking in a state of delirium and screaming out in distress and the episodes usually resolve after a few minutes    1.	Ac. HFrEF/CM  2.	UTI  3.	S/P PPM  4.	H/O ITP  5.	B/L Pleural effusion  6.	PPM malfunction  7.	Critical AS/Mod Mitral regurgitation         PLAN:    ·	Cont lasix 40 mg po q 24h   ·	Card and EP F/U noted. Family is not interested in TAVR  ·	Cont metoprolol 25 mg po q 12h. BP is stable now. Will add losartan 25 mg jeffrey q 24h  ·	Cont IV Rocephin  ·	EP will replace the battery this week likely on Wed. Waiting to complete Abx course for UTI  ·	As per card pt is not a candidate for surgery or TAVR.

## 2018-09-10 NOTE — PROGRESS NOTE ADULT - ASSESSMENT
86 year old lady with PMHx ITP, diverticulitis, PPM presents after an episode of AMS a/w SOB, RAYA, orthopnea.    #SOB Likely secondary to CHF Exacerbation   -BNP 34240 on admission  -CXR: interval CHF  -cardio- lasix to 40 qd. Will take care of AS as outpt   c/w metoprolol er 25 mg po qd   If BP is stablizes will add ACE-I   -monitor strict I&O, daily weights  -1.5L/day fluid restriction  -c/w BiPAP PRN  -TTE- EF 29%, severe AS, G1DD, mod pleural effusion L lateral, mod MVR, mod TR, mild AR    #AICD battery replacement   EP- awaiting clearance ischemic heart disease valenzuela. Will do Cath, f/u with attending    #Cystitis:  -WBC 15 on admission, afebrile. WBC 7.39 now. No complaints of dysuria or increased frequency.   -c/w rocephin (9/6- ) for 5 days total    #ITP  -stable  -c/w home meds    #Abnormal LFT's   RUQ Abdominal sonogram- Liver normal, S/p cholecystectomy. Mild right hydronephrosis. Right pleural effusion.   LFT wnl now    #Hypomagnesemia  resolved      DVT ppx  GI ppx  CHG 4% daily  ambulate as tolerated  DASH/TLC with 1.5L/day fluid restriction    Dispo  -from home, lives with   -PT/rehab- SNF or home. Pt wants to go home    FULL CODE

## 2018-09-11 LAB
ANION GAP SERPL CALC-SCNC: 18 MMOL/L — HIGH (ref 7–14)
BUN SERPL-MCNC: 33 MG/DL — HIGH (ref 10–20)
CALCIUM SERPL-MCNC: 8.8 MG/DL — SIGNIFICANT CHANGE UP (ref 8.5–10.1)
CHLORIDE SERPL-SCNC: 94 MMOL/L — LOW (ref 98–110)
CO2 SERPL-SCNC: 25 MMOL/L — SIGNIFICANT CHANGE UP (ref 17–32)
CREAT SERPL-MCNC: 0.9 MG/DL — SIGNIFICANT CHANGE UP (ref 0.7–1.5)
GLUCOSE SERPL-MCNC: 218 MG/DL — HIGH (ref 70–99)
HCT VFR BLD CALC: 40.7 % — SIGNIFICANT CHANGE UP (ref 37–47)
HGB BLD-MCNC: 12.9 G/DL — SIGNIFICANT CHANGE UP (ref 12–16)
MCHC RBC-ENTMCNC: 28.2 PG — SIGNIFICANT CHANGE UP (ref 27–31)
MCHC RBC-ENTMCNC: 31.7 G/DL — LOW (ref 32–37)
MCV RBC AUTO: 89.1 FL — SIGNIFICANT CHANGE UP (ref 81–99)
NRBC # BLD: 0 /100 WBCS — SIGNIFICANT CHANGE UP (ref 0–0)
PLATELET # BLD AUTO: 246 K/UL — SIGNIFICANT CHANGE UP (ref 130–400)
POTASSIUM SERPL-MCNC: 3.9 MMOL/L — SIGNIFICANT CHANGE UP (ref 3.5–5)
POTASSIUM SERPL-SCNC: 3.9 MMOL/L — SIGNIFICANT CHANGE UP (ref 3.5–5)
RBC # BLD: 4.57 M/UL — SIGNIFICANT CHANGE UP (ref 4.2–5.4)
RBC # FLD: 12.5 % — SIGNIFICANT CHANGE UP (ref 11.5–14.5)
SODIUM SERPL-SCNC: 137 MMOL/L — SIGNIFICANT CHANGE UP (ref 135–146)
WBC # BLD: 7.49 K/UL — SIGNIFICANT CHANGE UP (ref 4.8–10.8)
WBC # FLD AUTO: 7.49 K/UL — SIGNIFICANT CHANGE UP (ref 4.8–10.8)

## 2018-09-11 RX ADMIN — Medication 100 MILLIGRAM(S): at 21:16

## 2018-09-11 RX ADMIN — Medication 25 MILLIGRAM(S): at 05:44

## 2018-09-11 RX ADMIN — Medication 1200 MILLIGRAM(S): at 05:44

## 2018-09-11 RX ADMIN — ENOXAPARIN SODIUM 40 MILLIGRAM(S): 100 INJECTION SUBCUTANEOUS at 11:05

## 2018-09-11 RX ADMIN — CHOLESTYRAMINE 4 GRAM(S): 4 POWDER, FOR SUSPENSION ORAL at 08:42

## 2018-09-11 RX ADMIN — Medication 100 MILLIGRAM(S): at 15:12

## 2018-09-11 RX ADMIN — CHLORHEXIDINE GLUCONATE 1 APPLICATION(S): 213 SOLUTION TOPICAL at 05:44

## 2018-09-11 RX ADMIN — PANTOPRAZOLE SODIUM 40 MILLIGRAM(S): 20 TABLET, DELAYED RELEASE ORAL at 08:42

## 2018-09-11 RX ADMIN — Medication 1200 MILLIGRAM(S): at 17:29

## 2018-09-11 RX ADMIN — LOSARTAN POTASSIUM 25 MILLIGRAM(S): 100 TABLET, FILM COATED ORAL at 05:44

## 2018-09-11 RX ADMIN — Medication 100 MILLIGRAM(S): at 05:43

## 2018-09-11 RX ADMIN — Medication 5 MILLIGRAM(S): at 05:44

## 2018-09-11 RX ADMIN — SENNA PLUS 2 TABLET(S): 8.6 TABLET ORAL at 21:16

## 2018-09-11 RX ADMIN — Medication 40 MILLIGRAM(S): at 05:43

## 2018-09-11 NOTE — PROGRESS NOTE ADULT - ASSESSMENT
86 year old lady with PMHx ITP, diverticulitis, PPM presents after an episode of AMS a/w SOB, RAYA, orthopnea.    #SOB Likely secondary to CHF Exacerbation   -BNP 33016 on admission  -CXR: interval CHF  -cardio- lasix to 40 qd.     #Aortic Stenosis:  Echo shows 0.62 m2 area, severe aortic stenosis  Follow-up Cardio consult on idea management  c/w metoprolol er 25 mg po qd   If BP is stablizes will add ACE-I   -monitor strict I&O, daily weights  -1.5L/day fluid restriction  -c/w BiPAP PRN  -TTE- EF 29%, severe AS, G1DD, mod pleural effusion L lateral, mod MVR, mod TR, mild AR    #AICD battery replacement   EP- awaiting clearance ischemic heart disease valenzuela. Will do Cath, f/u with attending    #Cystitis:  Completed Rocephin (9/6- ) for 7 days total    #ITP  -stable  -c/w home meds    #Abnormal LFT's   RUQ Abdominal sonogram- Liver normal, S/p cholecystectomy. Mild right hydronephrosis. Right pleural effusion.   LFT wnl now    #Hypomagnesemia  resolved      DVT ppx  GI ppx  CHG 4% daily  ambulate as tolerated  DASH/TLC with 1.5L/day fluid restriction    Dispo  -from home, lives with   -PT/rehab- SNF or home. Pt wants to go home    FULL CODE

## 2018-09-11 NOTE — PROGRESS NOTE ADULT - ATTENDING COMMENTS
Critical Aortic stenosis  Cardiomyopathy with EF 29%  Pacemaker necessitating battery change    I recommend cardiac cath  I recommend evaluation for TAVR prior to generator change  Patient may require upgrade of device to ICD vs. CRT-P vs. CRT-D depending on intervention for Ao Valve   Patient and family will discuss matter further with Dr. Shiakh
patient seen and examined independently on morning rounds, chart reviewed and discussed with the medicine resident and on interdisciplinary rounds and agree with the above resident progress note with the following addendum:    no overnight new events---still c/o weakness and difficulty ambulating --at time of exam she was oob in chair and family bedside    #Acute CHF exac (systolic) with severe AS (BASIM 0.62 m2)  -bnp 30k on admission  -cardiology following  -cont lasix oral   -f/u with cardiology re management (TAVR?)  -awaiting EPS battery replacement (were requesting ID evaluation prior to procedure however patient has completed 7 day course of IV abx (ceftriaxone)---if want to ensure resolution would repeat UA and Ucx prior to proceding with device battery change    #DVT/GI ppx    guarded prognosis
Patient seen. Had a long conversation with the patient, son, and her  regarding her options since she has multiple medical issues including critical AS and HFrEF. I discussed in detail the risks/benefits of a generator change.     We had entertained the idea of upgrading her device to a DC ICD but after obtaining further information from the family and after discussing the case further with Dr. Shaikh, it appears that the patient has multiple comorbidities and is in and out of the hospital. She is very immobile due to knee problems. Her life expectancy may be less than 1 year, so we will proceed with a generator change after ID clears her.    Please consult ID since she was just on antibiotics for a UTI.     Itzel Eller MD
Patient seen and examined independently. Agree with resident notes. Case discussed with housestaff, nursing and patient/pt decision maker.   # severe AS- lasix changed to po by cardiology  # Ac chf likely systolic-- has b/l pleural effusion-- on po lasix  # UTI- as per patient no symptoms and family too agreed with this but since she had encephalopathy at admission and urine culture shows E Coli- will restart ceftriaxone  # hx of ITP- on steroids  # transaminitis due to passive congestion in liver-- USG normal

## 2018-09-11 NOTE — DIETITIAN INITIAL EVALUATION ADULT. - PHYSICAL APPEARANCE
OOB to chair. Alert, oriented. BMI- 26.5 (150lbs/5'3"). IBW -115lbs. Pt reports stable weight pta. No GI complains. SKin: intact. No chewing/swallowing difficulties reported.

## 2018-09-11 NOTE — PROGRESS NOTE ADULT - ASSESSMENT
Critical Aortic stenosis  Cardiomyopathy with EF 29%  Pacemaker necessitating battery change    - Patient deemed not a candidate for TAVR    - Patient require upgrade of device to ICD, risk and benefits discussed with patient and patient's  and son; they would like to discuss    options with Dr Shaikh prior to making a decision  - Need ID clearance prior to procedure, consult ID  - Continue other medical therapies  - Will follow Critical Aortic stenosis  Cardiomyopathy with EF 29%  Pacemaker necessitating battery change    - Patient deemed not a candidate for TAVR by TAVR team   - Patient may require upgrade of device to ICD, risk and benefits discussed with patient and patient's  and son; they would like to discuss    options with Dr Shaikh prior to making a decision  - Need ID clearance prior to procedure, consult ID  - Continue other medical therapies  - Will follow

## 2018-09-11 NOTE — DIETITIAN INITIAL EVALUATION ADULT. - ENERGY NEEDS
Estimated energy needs: ~1420kcal/d (MSJ x 1.3)   Estimated protein needs: 68-85gm/d (1-1.25gm/kg act wt) - for elderly pt  Estimated fluid needs: 1500ml/d per LIP

## 2018-09-11 NOTE — PROGRESS NOTE ADULT - SUBJECTIVE AND OBJECTIVE BOX
INTERVAL HPI/OVERNIGHT EVENTS:    Patient seen and examined no complaint's oob to chair  Tele no events overnight     MEDICATIONS  (STANDING):  chlorhexidine 4% Liquid 1 Application(s) Topical <User Schedule>  cholestyramine Powder (Sugar-Free) 4 Gram(s) Oral daily  diphenoxylate/atropine 1 Tablet(s) Oral daily  docusate sodium 100 milliGRAM(s) Oral three times a day  enoxaparin Injectable 40 milliGRAM(s) SubCutaneous daily  furosemide    Tablet 40 milliGRAM(s) Oral daily  losartan 25 milliGRAM(s) Oral daily  mesalamine DR Capsule 1200 milliGRAM(s) Oral two times a day  metoprolol succinate ER 25 milliGRAM(s) Oral daily  pantoprazole    Tablet 40 milliGRAM(s) Oral before breakfast  predniSONE   Tablet 5 milliGRAM(s) Oral daily  senna 2 Tablet(s) Oral at bedtime    Allergies  No Known Allergies    Vital Signs Last 24 Hrs  T(C): 36.2 (11 Sep 2018 12:50), Max: 36.7 (11 Sep 2018 05:34)  T(F): 97.2 (11 Sep 2018 12:50), Max: 98 (11 Sep 2018 05:34)  HR: 80 (11 Sep 2018 12:50) (74 - 80)  BP: 104/52 (11 Sep 2018 12:50) (104/52 - 138/63)  RR: 18 (11 Sep 2018 12:50) (18 - 18)  SpO2: 96% (11 Sep 2018 06:16) (95% - 96%)    09-10-18 @ 07:01  -  09-11-18 @ 07:00  --------------------------------------------------------  IN: 80 mL / OUT: 100 mL / NET: -20 mL    09-11-18 @ 07:01  -  09-11-18 @ 18:31  --------------------------------------------------------  IN: 0 mL / OUT: 100 mL / NET: -100 mL    Physical Exam    GENERAL: In no apparent distress, well nourished, and hydrated.  HEAD:  Atraumatic, Normocephalic  EYES: EOMI, PERRLA, conjunctiva and sclera clear  ENMT: No tonsillar erythema, exudates, or enlargements; ist mucous membranes, Good dentition, No lesions  NECK: Supple and normal thyroid.  No JVD or carotid bruit.  Carotid pulse is 2+ bilaterally.  HEART: Regular rate and rhythm; No murmurs, rubs, or gallops.  PULMONARY: Clear to auscultation and perfusion.  No rales, wheezing, or rhonchi bilaterally.  ABDOMEN: Soft, Nontender, Nondistended; Bowel sounds present  EXTREMITIES:  2+ Peripheral Pulses, No clubbing, cyanosis, or edema  LYMPH: No lymphadenopathy noted  NEUROLOGICAL: Grossly nonfocal    LABS:                      12.9   7.49  )-----------( 246      ( 11 Sep 2018 09:45 )             40.7     09-11    137  |  94<L>  |  33<H>  ----------------------------<  218<H>  3.9   |  25  |  0.9    Ca    8.8      11 Sep 2018 09:45  Mg     2.0     09-10    TPro  5.7<L>  /  Alb  3.9  /  TBili  0.3  /  DBili  x   /  AST  13  /  ALT  20  /  AlkPhos  84  09-10

## 2018-09-11 NOTE — DIETITIAN INITIAL EVALUATION ADULT. - OTHER INFO
Pt admitted for an episode of AMS and SOB. SOB Likely secondary to CHF Exacerbation (now on 1.5L/day fluid restriction).  Pt needs AICD battery replacement, scheduled for tomorrow (9/12).  Cystitis - completed abx, no more complains. Reason for assessment: LOS.

## 2018-09-11 NOTE — PROGRESS NOTE ADULT - SUBJECTIVE AND OBJECTIVE BOX
Patient is doing well, still reports weakness. Not able to get out of bed without assistance. Eating, voiding and passing stools. No other complaints      PHYSICAL EXAM:T(C): 36.2 (09-11-18 @ 12:50), Max: 36.7 (09-11-18 @ 05:34)  HR: 80 (09-11-18 @ 12:50) (74 - 80)  BP: 104/52 (09-11-18 @ 12:50) (104/52 - 138/63)  RR: 18 (09-11-18 @ 12:50) (18 - 18)  SpO2: 96% (09-11-18 @ 06:16) (95% - 96%)  GENERAL: NAD, well-developed  HEAD:  Atraumatic, Normocephalic  EYES: EOMI, PERRLA, conjunctiva and sclera clear  NECK: Supple, No JVD  CHEST/LUNG: Clear to auscultation bilaterally; No wheeze  HEART: Regular rate and rhythm; Systolic murmur at upper left sternal border  ABDOMEN: Soft, Nontender, Nondistended; Bowel sounds present  EXTREMITIES:  2+ Peripheral Pulses, No clubbing, cyanosis, or edema  PSYCH: AAOx3  NEUROLOGY: non-focal  SKIN: No rashes or lesions                            12.9   7.49  )-----------( 246      ( 11 Sep 2018 09:45 )             40.7       09-11    137  |  94<L>  |  33<H>  ----------------------------<  218<H>  3.9   |  25  |  0.9    Ca    8.8      11 Sep 2018 09:45  Mg     2.0     09-10    TPro  5.7<L>  /  Alb  3.9  /  TBili  0.3  /  DBili  x   /  AST  13  /  ALT  20  /  AlkPhos  84  09-10

## 2018-09-11 NOTE — DIETITIAN INITIAL EVALUATION ADULT. - DIET TYPE
DASH/TLC (sodium and cholesterol restricted diet)/Pt reports good appetite; per PCA and CA pt eats well

## 2018-09-12 LAB
HCT VFR BLD CALC: 38.7 % — SIGNIFICANT CHANGE UP (ref 37–47)
HGB BLD-MCNC: 12.3 G/DL — SIGNIFICANT CHANGE UP (ref 12–16)
MCHC RBC-ENTMCNC: 28.1 PG — SIGNIFICANT CHANGE UP (ref 27–31)
MCHC RBC-ENTMCNC: 31.8 G/DL — LOW (ref 32–37)
MCV RBC AUTO: 88.6 FL — SIGNIFICANT CHANGE UP (ref 81–99)
NRBC # BLD: 0 /100 WBCS — SIGNIFICANT CHANGE UP (ref 0–0)
PLATELET # BLD AUTO: 225 K/UL — SIGNIFICANT CHANGE UP (ref 130–400)
RBC # BLD: 4.37 M/UL — SIGNIFICANT CHANGE UP (ref 4.2–5.4)
RBC # FLD: 12.4 % — SIGNIFICANT CHANGE UP (ref 11.5–14.5)
WBC # BLD: 7.07 K/UL — SIGNIFICANT CHANGE UP (ref 4.8–10.8)
WBC # FLD AUTO: 7.07 K/UL — SIGNIFICANT CHANGE UP (ref 4.8–10.8)

## 2018-09-12 RX ORDER — LOSARTAN POTASSIUM 100 MG/1
12.5 TABLET, FILM COATED ORAL DAILY
Qty: 0 | Refills: 0 | Status: DISCONTINUED | OUTPATIENT
Start: 2018-09-12 | End: 2018-09-14

## 2018-09-12 RX ADMIN — SENNA PLUS 2 TABLET(S): 8.6 TABLET ORAL at 21:19

## 2018-09-12 RX ADMIN — Medication 1200 MILLIGRAM(S): at 05:50

## 2018-09-12 RX ADMIN — Medication 100 MILLIGRAM(S): at 21:19

## 2018-09-12 RX ADMIN — Medication 25 MILLIGRAM(S): at 05:50

## 2018-09-12 RX ADMIN — CHOLESTYRAMINE 4 GRAM(S): 4 POWDER, FOR SUSPENSION ORAL at 08:14

## 2018-09-12 RX ADMIN — PANTOPRAZOLE SODIUM 40 MILLIGRAM(S): 20 TABLET, DELAYED RELEASE ORAL at 05:50

## 2018-09-12 RX ADMIN — ENOXAPARIN SODIUM 40 MILLIGRAM(S): 100 INJECTION SUBCUTANEOUS at 11:59

## 2018-09-12 RX ADMIN — Medication 100 MILLIGRAM(S): at 13:08

## 2018-09-12 RX ADMIN — Medication 40 MILLIGRAM(S): at 05:50

## 2018-09-12 RX ADMIN — LOSARTAN POTASSIUM 25 MILLIGRAM(S): 100 TABLET, FILM COATED ORAL at 05:50

## 2018-09-12 RX ADMIN — Medication 5 MILLIGRAM(S): at 05:50

## 2018-09-12 RX ADMIN — Medication 100 MILLIGRAM(S): at 05:50

## 2018-09-12 RX ADMIN — CHLORHEXIDINE GLUCONATE 1 APPLICATION(S): 213 SOLUTION TOPICAL at 05:51

## 2018-09-12 RX ADMIN — Medication 1200 MILLIGRAM(S): at 17:06

## 2018-09-12 NOTE — PROGRESS NOTE ADULT - SUBJECTIVE AND OBJECTIVE BOX
Pt feels fine. no chest pain no dyspnea. no palpitations.     On Exam:  sitting out of bed to chair.   Vital Signs Last 24 Hrs  T(F): 97 (12 Sep 2018 05:41), Max: 97.2 (11 Sep 2018 12:50)  HR: 76 (12 Sep 2018 05:41) (76 - 86)  BP: 146/69 (12 Sep 2018 05:41) (104/52 - 146/69)  RR: 18 (12 Sep 2018 05:41) (18 - 18)  SpO2: 96% (11 Sep 2018 20:18) (96% - 96%)    PHYSICAL EXAM:    · NECK:	 supple  ·RESPIRATORY:  Good air entry bilaterally.    CARDIOVASCULAR	regular rate and rhythm  no rub  2/6 esm lsb aa.  . ABDOMEN :  no distention; bowel sounds normal; no rebound tenderness; no guarding; no rigidity  · EXTREMITIES: No cyanosis, clubbing or edema. trace edema b/l lower extremity edema.  ·NEUROLOGICAL:   alert                          12.3   7.07  )-----------( 225      ( 12 Sep 2018 08:00 )             38.7       09-11    137  |  94<L>  |  33<H>  ----------------------------<  218<H>  3.9   |  25  |  0.9    Ca    8.8      11 Sep 2018 09:45

## 2018-09-12 NOTE — PROGRESS NOTE ADULT - ASSESSMENT
Admitted with AMS/   ? dementia   ITP,   diverticulitis,   h/o CHF,   PPM,  Severe Lv dysfunction and Critical AS. On conservative therapy. not a candidate for Surgical intervention.     Advice reduce losartan to 12.5 mg once a day. can cause dizziness due to severe AS  continue toprol xl 25 mg  continue lasix 40 mg.   aspirin 81 mg once a day if not contraindications.  with food.   ep to decide when they can do generator change for ppm  not a candidate for AICD.   dvt prophylaxis.     pls recall us prn

## 2018-09-12 NOTE — PROGRESS NOTE ADULT - ASSESSMENT
86 year old lady with PMHx ITP, diverticulitis, PPM presents after an episode of AMS a/w SOB, RAYA, orthopnea.    #SOB Likely secondary to CHF Exacerbation   -BNP 52592 on admission  -CXR: interval CHF  -cardio- lasix to 40 qd.     #Aortic Stenosis:  Echo shows 0.62 m2 area, severe aortic stenosis  Not a candidate for TAVR  c/w metoprolol er 25 mg po qd   Change Losartan 25mg dose to 12.mg daily   monitor strict I&O, daily weights  1.5L/day fluid restriction  c/w BiPAP PRN  TTE- EF 29%, severe AS, G1DD, mod pleural effusion L lateral, mod MVR, mod TR, mild AR    #AICD battery replacement   Scheduled for tomorrow  Keep NPO after midnight  Hold Lovenox for now    #Cystitis:  Completed Rocephin (9/6- ) for 7 days total  ID consulted for clearance for battery replacement procedure  #ITP  -stable  -c/w home meds    #Abnormal LFT's   RUQ Abdominal sonogram- Liver normal, S/p cholecystectomy. Mild right hydronephrosis. Right pleural effusion.   LFT wnl now    #Hypomagnesemia  resolved      DVT ppx  GI ppx  CHG 4% daily  ambulate as tolerated  DASH/TLC with 1.5L/day fluid restriction    Dispo  -from home, lives with   -PT/rehab- SNF or home. Pt wants to go home    FULL CODE

## 2018-09-12 NOTE — PROGRESS NOTE ADULT - SUBJECTIVE AND OBJECTIVE BOX
Patient is doing better, still reports generalized weakness and fatigue. No other complaints    PHYSICAL EXAM:T(C): 35.6 (09-12-18 @ 14:45), Max: 36.1 (09-12-18 @ 05:41)  HR: 67 (09-12-18 @ 14:45) (67 - 86)  BP: 108/54 (09-12-18 @ 14:45) (108/54 - 146/69)  RR: 18 (09-12-18 @ 14:45) (18 - 18)  SpO2: 96% (09-11-18 @ 20:18) (96% - 96%)  GENERAL: NAD, well-developed  HEAD:  Atraumatic, Normocephalic  EYES: EOMI, PERRLA, conjunctiva and sclera clear  NECK: Supple, No JVD  CHEST/LUNG: Clear to auscultation bilaterally; No wheeze  HEART: Regular rate and rhythm; No murmurs, rubs, or gallops  ABDOMEN: Soft, Nontender, Nondistended; Bowel sounds present  EXTREMITIES:  2+ Peripheral Pulses, No clubbing, cyanosis, or edema  PSYCH: AAOx3  NEUROLOGY: non-focal  SKIN: No rashes or lesions

## 2018-09-13 PROBLEM — D69.3 IMMUNE THROMBOCYTOPENIC PURPURA: Chronic | Status: ACTIVE | Noted: 2018-09-04

## 2018-09-13 LAB
APPEARANCE UR: ABNORMAL
BACTERIA # UR AUTO: ABNORMAL /HPF
BILIRUB UR-MCNC: NEGATIVE — SIGNIFICANT CHANGE UP
COLOR SPEC: YELLOW — SIGNIFICANT CHANGE UP
DIFF PNL FLD: NEGATIVE — SIGNIFICANT CHANGE UP
EPI CELLS # UR: ABNORMAL /HPF
GLUCOSE UR QL: NEGATIVE MG/DL — SIGNIFICANT CHANGE UP
HCT VFR BLD CALC: 39.9 % — SIGNIFICANT CHANGE UP (ref 37–47)
HGB BLD-MCNC: 12.9 G/DL — SIGNIFICANT CHANGE UP (ref 12–16)
KETONES UR-MCNC: NEGATIVE — SIGNIFICANT CHANGE UP
LEUKOCYTE ESTERASE UR-ACNC: ABNORMAL
MCHC RBC-ENTMCNC: 28.4 PG — SIGNIFICANT CHANGE UP (ref 27–31)
MCHC RBC-ENTMCNC: 32.3 G/DL — SIGNIFICANT CHANGE UP (ref 32–37)
MCV RBC AUTO: 87.7 FL — SIGNIFICANT CHANGE UP (ref 81–99)
NITRITE UR-MCNC: NEGATIVE — SIGNIFICANT CHANGE UP
NRBC # BLD: 0 /100 WBCS — SIGNIFICANT CHANGE UP (ref 0–0)
PH UR: 5 — SIGNIFICANT CHANGE UP (ref 5–8)
PLATELET # BLD AUTO: 259 K/UL — SIGNIFICANT CHANGE UP (ref 130–400)
PROT UR-MCNC: ABNORMAL MG/DL
RBC # BLD: 4.55 M/UL — SIGNIFICANT CHANGE UP (ref 4.2–5.4)
RBC # FLD: 12.4 % — SIGNIFICANT CHANGE UP (ref 11.5–14.5)
SP GR SPEC: 1.02 — SIGNIFICANT CHANGE UP (ref 1.01–1.03)
UROBILINOGEN FLD QL: 0.2 MG/DL — SIGNIFICANT CHANGE UP (ref 0.2–0.2)
WBC # BLD: 7.18 K/UL — SIGNIFICANT CHANGE UP (ref 4.8–10.8)
WBC # FLD AUTO: 7.18 K/UL — SIGNIFICANT CHANGE UP (ref 4.8–10.8)
WBC UR QL: ABNORMAL /HPF

## 2018-09-13 RX ORDER — ENOXAPARIN SODIUM 100 MG/ML
40 INJECTION SUBCUTANEOUS DAILY
Qty: 0 | Refills: 0 | Status: DISCONTINUED | OUTPATIENT
Start: 2018-09-13 | End: 2018-09-14

## 2018-09-13 RX ORDER — VANCOMYCIN HCL 1 G
1000 VIAL (EA) INTRAVENOUS ONCE
Qty: 0 | Refills: 0 | Status: COMPLETED | OUTPATIENT
Start: 2018-09-13 | End: 2018-09-13

## 2018-09-13 RX ADMIN — Medication 40 MILLIGRAM(S): at 06:01

## 2018-09-13 RX ADMIN — LOSARTAN POTASSIUM 12.5 MILLIGRAM(S): 100 TABLET, FILM COATED ORAL at 06:01

## 2018-09-13 RX ADMIN — Medication 250 MILLIGRAM(S): at 09:46

## 2018-09-13 RX ADMIN — Medication 25 MILLIGRAM(S): at 06:00

## 2018-09-13 RX ADMIN — Medication 5 MILLIGRAM(S): at 06:00

## 2018-09-13 RX ADMIN — CHLORHEXIDINE GLUCONATE 1 APPLICATION(S): 213 SOLUTION TOPICAL at 06:00

## 2018-09-13 RX ADMIN — PANTOPRAZOLE SODIUM 40 MILLIGRAM(S): 20 TABLET, DELAYED RELEASE ORAL at 06:00

## 2018-09-13 RX ADMIN — Medication 1200 MILLIGRAM(S): at 17:27

## 2018-09-13 RX ADMIN — Medication 100 MILLIGRAM(S): at 06:01

## 2018-09-13 RX ADMIN — Medication 250 MILLIGRAM(S): at 09:45

## 2018-09-13 RX ADMIN — CHOLESTYRAMINE 4 GRAM(S): 4 POWDER, FOR SUSPENSION ORAL at 08:29

## 2018-09-13 RX ADMIN — Medication 1200 MILLIGRAM(S): at 06:00

## 2018-09-13 NOTE — CONSULT NOTE ADULT - NEGATIVE GENERAL GENITOURINARY SYMPTOMS
no flank pain R/no incontinence/normal urinary frequency/no dysuria/no urinary hesitancy/no flank pain L/no hematuria/no renal colic

## 2018-09-13 NOTE — PROGRESS NOTE ADULT - SUBJECTIVE AND OBJECTIVE BOX
Patient is day 0 post battery replacement. Doing well, complains of no pain or any other complaints    PHYSICAL EXAM:T(C): 36.7 (09-13-18 @ 09:21), Max: 36.7 (09-13-18 @ 05:45)  HR: 69 (09-13-18 @ 09:21) (69 - 83)  BP: 166/73 (09-13-18 @ 09:21) (140/60 - 166/73)  RR: 18 (09-13-18 @ 09:21) (18 - 19)  SpO2: 95% (09-12-18 @ 23:20) (95% - 95%)  GENERAL: NAD, well-developed  HEAD:  Atraumatic, Normocephalic  EYES: EOMI, PERRLA, conjunctiva and sclera clear  NECK: Supple, No JVD  CHEST/LUNG: Clear to auscultation bilaterally; No wheeze  HEART: Regular rate and rhythm; No murmurs, rubs, or gallops  ABDOMEN: Soft, Nontender, Nondistended; Bowel sounds present  EXTREMITIES:  2+ Peripheral Pulses, No clubbing, cyanosis, or edema  PSYCH: AAOx3  NEUROLOGY: non-focal  SKIN: No rashes or lesions                          12.9   7.18  )-----------( 259      ( 13 Sep 2018 06:07 )             39.9

## 2018-09-13 NOTE — PROGRESS NOTE ADULT - SUBJECTIVE AND OBJECTIVE BOX
Electrophysiology Brief Post-Op Note    I have personally seen and examined the patient.  I agree with the history and physical which I have reviewed and noted any changes below.  09-13-18 @ 12:38    PRE-OP DIAGNOSIS: SSS    POST-OP DIAGNOSIS: SSS     PROCEDURE: Generator Change of DC PPM    Physician: Itzel Eller MD  Assistant: None    ESTIMATED BLOOD LOSS:     5  mL    ANESTHESIA TYPE:  [  ]General Anesthesia  [ x ] Sedation  [  ] Local/Regional    CONDITION  [  ] Critical  [  ] Serious  [  ]Fair  [ x ]Good      SPECIMENS REMOVED (IF APPLICABLE): None      IMPLANTS (IF APPLICABLE): DC PPM      PLAN OF CARE  - Vancomyocin 1g IV q12 h  - DC all heparin produces and lovenox  - No anticoagulation unless recommended by EP attending      Itzel Eller MD  Electrophysiology

## 2018-09-13 NOTE — CONSULT NOTE ADULT - ASSESSMENT
IMPRESSION: Rehab of gait dysfunction      PRECAUTIONS: [  ] Cardiac  [  ] Respiratory  [  ] Seizures [  ] Contact Isolation  [  ] Droplet Isolation  [  ] Other    Weight Bearing Status:     RECOMMENDATION:    Out of Bed to Chair     DVT/Decubiti Prophylaxis    REHAB PLAN:     [x   ] Bedside P/T 3-5 times a week   [   ]   Bedside O/T  2-3 times a week             [   ] No Rehab Therapy Indicated                   [   ]  Speech Therapy   Conditioning/ROM                                    ADL  Bed Mobility                                               Conditioning/ROM  Transfers                                                     Bed Mobility  Sitting /Standing Balance                         Transfers                                        Gait Training                                               Sitting/Standing Balance  Stair Training [   ]Applicable                    Home equipment Eval                                                                        Splinting  [   ] Only      GOALS:   ADL   [   ]   Independent                    Transfers  [x   ] Independent                          Ambulation  [ x  ] Independent     [ x   ] With device                            [   ]  CG                                                         [   ]  CG                                                                  [   ] CG                            [    ] Min A                                                   [   ] Min A                                                              [   ] Min  A          DISCHARGE PLAN:   [   ]  Good candidate for Intensive Rehabilitation/Hospital based-4A [facility]                                             Will tolerate 3hrs Intensive Rehab Daily                                       [ x   ]  Short Term Rehab in Skilled Nursing Facility                            vs           [ x   ]  Home with Outpatient or VN services                                         [    ]  Possible Candidate for Intensive Hospital based Rehab
aicd   acute shf on chronic shf   hfref   change in mental status now baseline   htn   sob   severe AS nonoperable or tavrable   ITP     pt now improved, change lasix to 40 mg po q12 today   get PT   possible d/c tomorrow after seen by me.   family aware difficulty in taking care of patients medically with AS and severe lv dysfunction
PPM EOL  - will plan generator change once UTI cleared   - continue tele monitoring    Acute on Chronic CHF exacerbation  - continue current heart failure medical therapies    UTI  - continue current antibiotics  - recommend ID consult
IMPRESSION:  No evidence of cystitis/ pyelonephritis.  Asymptomatic pyuria/ bacteruria    RECOMMENDATIONS:  No antibiotics.  Could proceed with cardiac intervention from ID standpoint.

## 2018-09-13 NOTE — PROGRESS NOTE ADULT - ASSESSMENT
86 year old lady with PMHx ITP, diverticulitis, PPM presents after an episode of AMS a/w SOB, RAYA, orthopnea.    #SOB Likely secondary to CHF Exacerbation   -BNP 58070 on admission  -CXR: interval CHF  -cardio- lasix to 40 qd.     #Aortic Stenosis:  Echo shows 0.62 m2 area, severe aortic stenosis  Not a candidate for TAVR  c/w metoprolol er 25 mg po qd   Change Losartan 25mg dose to 12.mg daily   monitor strict I&O, daily weights  1.5L/day fluid restriction  c/w BiPAP PRN  TTE- EF 29%, severe AS, G1DD, mod pleural effusion L lateral, mod MVR, mod TR, mild AR    #AICD battery replacement   Done today  CXR clear today, repeat in AM    #Cystitis:  Completed Rocephin (9/6- ) for 7 days total  ID consulted for clearance for battery replacement procedure    #ITP  -stable  -c/w home meds    #Abnormal LFT's   RUQ Abdominal sonogram- Liver normal, S/p cholecystectomy. Mild right hydronephrosis. Right pleural effusion.   LFT wnl now    #Hypomagnesemia  resolved      DVT ppx  GI ppx  CHG 4% daily  ambulate as tolerated  DASH/TLC with 1.5L/day fluid restriction    Dispo  -from home, lives with   -PT/rehab- SNF or home. Pt wants to go home    FULL CODE

## 2018-09-13 NOTE — PRE-ANESTHESIA EVALUATION ADULT - NSANTHOSAYNRD_GEN_A_CORE
No. JENY screening performed.  STOP BANG Legend: 0-2 = LOW Risk; 3-4 = INTERMEDIATE Risk; 5-8 = HIGH Risk

## 2018-09-14 VITALS
SYSTOLIC BLOOD PRESSURE: 113 MMHG | RESPIRATION RATE: 18 BRPM | DIASTOLIC BLOOD PRESSURE: 59 MMHG | TEMPERATURE: 97 F | HEART RATE: 62 BPM

## 2018-09-14 LAB
HCT VFR BLD CALC: 41.4 % — SIGNIFICANT CHANGE UP (ref 37–47)
HGB BLD-MCNC: 13.2 G/DL — SIGNIFICANT CHANGE UP (ref 12–16)
MCHC RBC-ENTMCNC: 28 PG — SIGNIFICANT CHANGE UP (ref 27–31)
MCHC RBC-ENTMCNC: 31.9 G/DL — LOW (ref 32–37)
MCV RBC AUTO: 87.9 FL — SIGNIFICANT CHANGE UP (ref 81–99)
NRBC # BLD: 0 /100 WBCS — SIGNIFICANT CHANGE UP (ref 0–0)
PLATELET # BLD AUTO: 233 K/UL — SIGNIFICANT CHANGE UP (ref 130–400)
RBC # BLD: 4.71 M/UL — SIGNIFICANT CHANGE UP (ref 4.2–5.4)
RBC # FLD: 12.2 % — SIGNIFICANT CHANGE UP (ref 11.5–14.5)
WBC # BLD: 7.52 K/UL — SIGNIFICANT CHANGE UP (ref 4.8–10.8)
WBC # FLD AUTO: 7.52 K/UL — SIGNIFICANT CHANGE UP (ref 4.8–10.8)

## 2018-09-14 RX ORDER — METOPROLOL TARTRATE 50 MG
1 TABLET ORAL
Qty: 30 | Refills: 0 | OUTPATIENT
Start: 2018-09-14

## 2018-09-14 RX ORDER — LOSARTAN POTASSIUM 100 MG/1
0.5 TABLET, FILM COATED ORAL
Qty: 15 | Refills: 0 | OUTPATIENT
Start: 2018-09-14 | End: 2018-10-13

## 2018-09-14 RX ORDER — CEPHALEXIN 500 MG
1 CAPSULE ORAL
Qty: 10 | Refills: 0 | OUTPATIENT
Start: 2018-09-14 | End: 2018-09-18

## 2018-09-14 RX ORDER — FUROSEMIDE 40 MG
1 TABLET ORAL
Qty: 0 | Refills: 0 | COMMUNITY
Start: 2018-09-14

## 2018-09-14 RX ORDER — VANCOMYCIN HCL 1 G
1000 VIAL (EA) INTRAVENOUS EVERY 12 HOURS
Qty: 0 | Refills: 0 | Status: DISCONTINUED | OUTPATIENT
Start: 2018-09-14 | End: 2018-09-14

## 2018-09-14 RX ORDER — METOPROLOL TARTRATE 50 MG
1 TABLET ORAL
Qty: 0 | Refills: 0 | DISCHARGE
Start: 2018-09-14

## 2018-09-14 RX ORDER — FUROSEMIDE 40 MG
1 TABLET ORAL
Qty: 30 | Refills: 0 | OUTPATIENT
Start: 2018-09-14 | End: 2018-10-13

## 2018-09-14 RX ORDER — LOSARTAN POTASSIUM 100 MG/1
12.5 TABLET, FILM COATED ORAL
Qty: 0 | Refills: 0 | DISCHARGE
Start: 2018-09-14

## 2018-09-14 RX ADMIN — Medication 1200 MILLIGRAM(S): at 05:32

## 2018-09-14 RX ADMIN — Medication 100 MILLIGRAM(S): at 05:31

## 2018-09-14 RX ADMIN — Medication 25 MILLIGRAM(S): at 05:32

## 2018-09-14 RX ADMIN — LOSARTAN POTASSIUM 12.5 MILLIGRAM(S): 100 TABLET, FILM COATED ORAL at 05:31

## 2018-09-14 RX ADMIN — CHLORHEXIDINE GLUCONATE 1 APPLICATION(S): 213 SOLUTION TOPICAL at 05:31

## 2018-09-14 RX ADMIN — Medication 5 MILLIGRAM(S): at 05:31

## 2018-09-14 RX ADMIN — Medication 40 MILLIGRAM(S): at 05:32

## 2018-09-14 RX ADMIN — Medication 100 MILLIGRAM(S): at 14:07

## 2018-09-14 RX ADMIN — CHOLESTYRAMINE 4 GRAM(S): 4 POWDER, FOR SUSPENSION ORAL at 11:40

## 2018-09-14 RX ADMIN — PANTOPRAZOLE SODIUM 40 MILLIGRAM(S): 20 TABLET, DELAYED RELEASE ORAL at 06:01

## 2018-09-14 NOTE — PROGRESS NOTE ADULT - ASSESSMENT
IMPRESSION:  No evidence of cystitis/ pyelonephritis.  Asymptomatic pyuria/ bacteruria    RECOMMENDATIONS:  No antibiotics.  Recall prn please.

## 2018-09-14 NOTE — PROGRESS NOTE ADULT - PROVIDER SPECIALTY LIST ADULT
Cardiology
Electrophysiology
Infectious Disease
Internal Medicine

## 2018-09-14 NOTE — PROGRESS NOTE ADULT - SUBJECTIVE AND OBJECTIVE BOX
SHELLEY PETIT  86y, Female      OVERNIGHT EVENTS:    no fevers, polyuria, no CVA pain, no dysuria    VITALS:  T(F): 97, Max: 97 (18 @ 06:24)  HR: 62  BP: 113/59  RR: 18Vital Signs Last 24 Hrs  T(C): 36.1 (14 Sep 2018 06:24), Max: 36.7 (13 Sep 2018 09:21)  T(F): 97 (14 Sep 2018 06:24), Max: 97 (14 Sep 2018 06:24)  HR: 62 (14 Sep 2018 06:24) (62 - 79)  BP: 113/59 (14 Sep 2018 06:24) (113/59 - 166/73)  BP(mean): --  RR: 18 (14 Sep 2018 06:24) (18 - 18)  SpO2: 96% (13 Sep 2018 20:07) (96% - 96%)    TESTS & MEASUREMENTS:                        12.9   7.18  )-----------( 259      ( 13 Sep 2018 06:07 )             39.9               Urinalysis Basic - ( 13 Sep 2018 06:10 )    Color: Yellow / Appearance: Cloudy / S.020 / pH: x  Gluc: x / Ketone: Negative  / Bili: Negative / Urobili: 0.2 mg/dL   Blood: x / Protein: Trace mg/dL / Nitrite: Negative   Leuk Esterase: Small / RBC: x / WBC 10-25 /HPF   Sq Epi: x / Non Sq Epi: Moderate /HPF / Bacteria: Moderate /HPF          RADIOLOGY & ADDITIONAL TESTS:    ANTIBIOTICS:

## 2018-09-14 NOTE — PROGRESS NOTE ADULT - SUBJECTIVE AND OBJECTIVE BOX
SHELLEY PETIT  86y Female    CHIEF COMPLAINT:    Patient is a 86y old  Female who presents with a chief complaint of AMS (14 Sep 2018 06:50)      INTERVAL HPI/OVERNIGHT EVENTS:    Patient seen and examined. S/P PPM. Feels good. No sob. No cp. No fever. No abdominal pain    ROS: All other systems are negative.    Vital Signs:    T(F): 97 (18 @ 06:24), Max: 97 (18 @ 06:24)  HR: 62 (18 @ 06:24) (62 - 79)  BP: 113/59 (18 @ 06:24) (113/59 - 123/55)  RR: 18 (18 @ 06:24) (18 - 18)  SpO2: 96% (18 @ 20:07) (96% - 96%)  I&O's Summary    13 Sep 2018 07:01  -  14 Sep 2018 07:00  --------------------------------------------------------  IN: 360 mL / OUT: 400 mL / NET: -40 mL      Daily     Daily Weight in k (14 Sep 2018 06:14)  CAPILLARY BLOOD GLUCOSE          PHYSICAL EXAM:    GENERAL:  NAD  SKIN: No rashes or lesions  HENT: Atrumatic. Normocephalic. PERRL. Moist membranes.  NECK: Supple, No JVD. No lymphadenopathy.  PULMONARY: BS are decreased B/L. No wheezing. No rales  CVS: Normal S1, S2. Rate and Rythm are regular. No murmurs.  ABDOMEN/GI: Soft, Nontender, Nondistended; BS present  EXTREMITIES: Peripheral pulses intact. No edema B/L LE.  NEUROLOGIC:  No motor or sensory deficit.  PSYCH: Alert & oriented x 0    Consultant(s) Notes Reviewed:  [x ] YES  [ ] NO  Care Discussed with Consultants/Other Providers [ x] YES  [ ] NO    EKG reviewed  Telemetry reviewed    LABS:                        13.2   7.52  )-----------( 233      ( 14 Sep 2018 08:38 )             41.4                     RADIOLOGY & ADDITIONAL TESTS:      Imaging or report Personally Reviewed:  [ ] YES  [ ] NO    Medications:  Standing  chlorhexidine 4% Liquid 1 Application(s) Topical <User Schedule>  cholestyramine Powder (Sugar-Free) 4 Gram(s) Oral daily  diphenoxylate/atropine 1 Tablet(s) Oral daily  docusate sodium 100 milliGRAM(s) Oral three times a day  furosemide    Tablet 40 milliGRAM(s) Oral daily  losartan 12.5 milliGRAM(s) Oral daily  mesalamine DR Capsule 1200 milliGRAM(s) Oral two times a day  metoprolol succinate ER 25 milliGRAM(s) Oral daily  pantoprazole    Tablet 40 milliGRAM(s) Oral before breakfast  predniSONE   Tablet 5 milliGRAM(s) Oral daily  senna 2 Tablet(s) Oral at bedtime  vancomycin  IVPB 1000 milliGRAM(s) IV Intermittent every 12 hours    PRN Meds      Case discussed with resident    Care discussed with pt/family

## 2018-09-14 NOTE — PROGRESS NOTE ADULT - ASSESSMENT
86 year old lady with PMHx ITP, diverticulitis, PPM presents after an episode of AMS a/w SOB, RAYA, orthopnea. As per family, this has been recurring for the past 2-3 weeks where the patient has been waking in a state of delirium and screaming out in distress and the episodes usually resolve after a few minutes    1.	Ac. HFrEF/CM  2.	S/P PPM battery change  3.	H/O ITP  4.	B/L Pleural effusion  5.	Critical AS/Mod Mitral regurgitation         PLAN:    ·	Cont lasix 40 mg po q 24h   ·	Card and EP F/U noted. Family is not interested in TAVR  ·	Cont metoprolol 25 mg po q 12h. and Losartan 12.5 mg po q 24h  ·	EP F/U. PPM interrogation. Check CXR PA and Lat view prior to D/C.  ·	As per card pt is not a candidate for surgery or TAVR.     * Med rec done by me. Plan of care D/W the pt's family in detail. Time spent 36 minutes.

## 2018-09-14 NOTE — CHART NOTE - NSCHARTNOTEFT_GEN_A_CORE
Cardiac Electrophysiology  Procedure Report    Date of procedure	2018  Name	Brooke Earl  Medical Record Number	[id number]  Date of Birth	1932  Electrophysiology Attending	Itzel Eller MD  Procedure	Generator change of a Dual Chamber Pacemaker (Medtronic)    Indication: Sick Sinus Syndrome, Generator at EOL    EQUIPMENT AND BASELINE PARAMETERS    Pulse Generator Explanted   :	Medtronic  Model Number	[id number]  Serial Number	[id number] (implanted 2006)    Pulse Generator Implanted   :	Medtronic  Model Number	[id number]  Serial Number	[id number]    Right Atrial Lead  : 	Medtronic  Model Number:	5594-45   Serial Number:	[id number]  Date of implant: 	2006	  Location:	              RA appendage  Threshold:	0.75 V at 0.4 msec  Sensing:		3.1 mV  Impedance:	437 Ohms      Right Ventricular Lead  : 	Medtronic  Model Number:	5076-52  Serial Number:	[id number]   Date of implant: 	2006	  Location:   	RV apex  Threshold:	1.0 V at 0.4 msec  Sensin.9  Impedance:	 513 Ohms      The left shoulder and pectoral regions were prepped and draped in the usual fashion. The left infraclavicular region was anesthetized with local anesthesia. An incision was made below the left clavicle and the pocket opened. The device was delivered and the leads detached. Bleeding points were cauterized. The visible portions of the leads had a normal appearance.  The pocket was checked for hemostasis and then copiously irrigated with antibiotic solution. A new defibrillator was attached to the leads and appropriate function of the leads/device was verified through the device. The device and leads were placed into the pocket. The patient was deeply sedated by anesthesia. The pocket was closed in 2 layers with absorbable suture and then Dermabond was applied over the incision. Steristrips were placed across the incision and a sterile dressing was applied. The patient tolerated the procedure well and no complications were observed.    COMPLICATIONS:  The patient tolerated the procedure well. There were no immediate complications.    CONCLUSIONS:  Successful generator change of a dual chamber pacemaker (Medtronic)      _______________________________  Itzel Eller MD  Cardiac Electrophysiology

## 2018-09-14 NOTE — PROGRESS NOTE ADULT - REASON FOR ADMISSION
AMS
CHF,CRITICAL AORTIC STENOSIS, PPM,

## 2018-09-16 LAB

## 2018-09-17 PROBLEM — I42.9 CARDIOMYOPATHY, UNSPECIFIED: Chronic | Status: ACTIVE | Noted: 2018-09-13

## 2018-09-17 PROBLEM — F03.90 UNSPECIFIED DEMENTIA WITHOUT BEHAVIORAL DISTURBANCE: Chronic | Status: ACTIVE | Noted: 2018-09-13

## 2018-09-18 ENCOUNTER — APPOINTMENT (OUTPATIENT)
Age: 83
End: 2018-09-18

## 2018-09-18 RX ORDER — IRON/IRON ASP GLY/FA/MV-MIN 38 125-25-1MG
TABLET ORAL
Refills: 0 | Status: DISCONTINUED | COMMUNITY
End: 2018-09-18

## 2018-09-18 RX ORDER — UBIDECARENONE/VIT E ACET 100MG-5
50 MCG CAPSULE ORAL
Refills: 0 | Status: DISCONTINUED | COMMUNITY
End: 2018-09-18

## 2018-09-18 RX ORDER — CHLORHEXIDINE GLUCONATE 4 %
1000 LIQUID (ML) TOPICAL
Refills: 0 | Status: DISCONTINUED | COMMUNITY
End: 2018-09-18

## 2018-09-24 DIAGNOSIS — R91.1 SOLITARY PULMONARY NODULE: ICD-10-CM

## 2018-09-24 DIAGNOSIS — I49.5 SICK SINUS SYNDROME: ICD-10-CM

## 2018-09-24 DIAGNOSIS — B96.20 UNSPECIFIED ESCHERICHIA COLI [E. COLI] AS THE CAUSE OF DISEASES CLASSIFIED ELSEWHERE: ICD-10-CM

## 2018-09-24 DIAGNOSIS — I42.9 CARDIOMYOPATHY, UNSPECIFIED: ICD-10-CM

## 2018-09-24 DIAGNOSIS — I08.0 RHEUMATIC DISORDERS OF BOTH MITRAL AND AORTIC VALVES: ICD-10-CM

## 2018-09-24 DIAGNOSIS — E83.42 HYPOMAGNESEMIA: ICD-10-CM

## 2018-09-24 DIAGNOSIS — I50.9 HEART FAILURE, UNSPECIFIED: ICD-10-CM

## 2018-09-24 DIAGNOSIS — F03.90 UNSPECIFIED DEMENTIA WITHOUT BEHAVIORAL DISTURBANCE: ICD-10-CM

## 2018-09-24 DIAGNOSIS — D69.3 IMMUNE THROMBOCYTOPENIC PURPURA: ICD-10-CM

## 2018-09-24 DIAGNOSIS — R74.0 NONSPECIFIC ELEVATION OF LEVELS OF TRANSAMINASE AND LACTIC ACID DEHYDROGENASE [LDH]: ICD-10-CM

## 2018-09-24 DIAGNOSIS — Z45.010 ENCOUNTER FOR CHECKING AND TESTING OF CARDIAC PACEMAKER PULSE GENERATOR [BATTERY]: ICD-10-CM

## 2018-09-24 DIAGNOSIS — I95.9 HYPOTENSION, UNSPECIFIED: ICD-10-CM

## 2018-09-24 DIAGNOSIS — G92 TOXIC ENCEPHALOPATHY: ICD-10-CM

## 2018-09-24 DIAGNOSIS — I50.43 ACUTE ON CHRONIC COMBINED SYSTOLIC (CONGESTIVE) AND DIASTOLIC (CONGESTIVE) HEART FAILURE: ICD-10-CM

## 2018-09-24 DIAGNOSIS — I35.0 NONRHEUMATIC AORTIC (VALVE) STENOSIS: ICD-10-CM

## 2018-09-24 DIAGNOSIS — I11.0 HYPERTENSIVE HEART DISEASE WITH HEART FAILURE: ICD-10-CM

## 2018-09-24 DIAGNOSIS — R82.71 BACTERIURIA: ICD-10-CM

## 2018-10-03 ENCOUNTER — APPOINTMENT (OUTPATIENT)
Dept: CARDIOLOGY | Facility: CLINIC | Age: 83
End: 2018-10-03

## 2018-10-03 VITALS — SYSTOLIC BLOOD PRESSURE: 120 MMHG | DIASTOLIC BLOOD PRESSURE: 62 MMHG

## 2018-10-03 VITALS — BODY MASS INDEX: 24.69 KG/M2 | WEIGHT: 135 LBS

## 2018-10-03 DIAGNOSIS — Z87.19 PERSONAL HISTORY OF OTHER DISEASES OF THE DIGESTIVE SYSTEM: ICD-10-CM

## 2018-10-03 DIAGNOSIS — Z00.00 ENCOUNTER FOR GENERAL ADULT MEDICAL EXAMINATION W/OUT ABNORMAL FINDINGS: ICD-10-CM

## 2018-10-03 DIAGNOSIS — D69.3 IMMUNE THROMBOCYTOPENIC PURPURA: ICD-10-CM

## 2018-10-03 DIAGNOSIS — Z87.448 PERSONAL HISTORY OF OTHER DISEASES OF URINARY SYSTEM: ICD-10-CM

## 2018-11-13 ENCOUNTER — LABORATORY RESULT (OUTPATIENT)
Age: 83
End: 2018-11-13

## 2018-11-13 ENCOUNTER — APPOINTMENT (OUTPATIENT)
Dept: HEMATOLOGY ONCOLOGY | Facility: CLINIC | Age: 83
End: 2018-11-13

## 2018-11-13 ENCOUNTER — OUTPATIENT (OUTPATIENT)
Dept: OUTPATIENT SERVICES | Facility: HOSPITAL | Age: 83
LOS: 1 days | Discharge: HOME | End: 2018-11-13

## 2018-11-13 VITALS
TEMPERATURE: 96.8 F | WEIGHT: 135 LBS | DIASTOLIC BLOOD PRESSURE: 73 MMHG | HEART RATE: 60 BPM | HEIGHT: 62 IN | BODY MASS INDEX: 24.84 KG/M2 | SYSTOLIC BLOOD PRESSURE: 181 MMHG

## 2018-11-13 DIAGNOSIS — Z95.0 PRESENCE OF CARDIAC PACEMAKER: Chronic | ICD-10-CM

## 2018-11-13 LAB
HCT VFR BLD CALC: 36.9 %
HGB BLD-MCNC: 12 G/DL
MCHC RBC-ENTMCNC: 28.5 PG
MCHC RBC-ENTMCNC: 32.5 G/DL
MCV RBC AUTO: 87.6 FL
PLATELET # BLD AUTO: 237 K/UL
PMV BLD: 9.8 FL
RBC # BLD: 4.21 M/UL
RBC # FLD: 14 %
WBC # FLD AUTO: 11.48 K/UL

## 2018-11-25 NOTE — HISTORY OF PRESENT ILLNESS
[de-identified] : This is an 86yearold white female, accompanied by her , here for a follow up visit.  She has a history of chronic anemia and ITP.  She is taking Prednisone 5mg every other day for ulcerative colitis.  She is taking iron daily as well.  She does not report any bruising, petechiae, or bleeding.  She complains of chronic knee pain for which local injections has been given with minimal relief. Medical marijuana was prescribed by Dr. Flood but it did not work for the month she took it.  The rest of review of systems is otherwise unremarkable.  She is followed by her PCP.\par \par  [de-identified] : pt still on prednisone.\par No new complaints. \par No bleeding

## 2018-11-25 NOTE — ASSESSMENT
[FreeTextEntry1] : In summary, 85 year old female with H/O chronic ITP, platelets are normal today\par \par PLAN:\par continue close follow up.\par RTC in 4 M

## 2018-11-27 DIAGNOSIS — D69.3 IMMUNE THROMBOCYTOPENIC PURPURA: ICD-10-CM

## 2019-04-10 ENCOUNTER — APPOINTMENT (OUTPATIENT)
Dept: CARDIOLOGY | Facility: CLINIC | Age: 84
End: 2019-04-10
Payer: MEDICARE

## 2019-04-10 VITALS — HEART RATE: 60 BPM | HEIGHT: 62 IN | DIASTOLIC BLOOD PRESSURE: 60 MMHG | SYSTOLIC BLOOD PRESSURE: 130 MMHG

## 2019-04-10 DIAGNOSIS — I35.0 NONRHEUMATIC AORTIC (VALVE) STENOSIS: ICD-10-CM

## 2019-04-10 DIAGNOSIS — I50.20 UNSPECIFIED SYSTOLIC (CONGESTIVE) HEART FAILURE: ICD-10-CM

## 2019-04-10 PROCEDURE — 93280 PM DEVICE PROGR EVAL DUAL: CPT

## 2019-04-10 PROCEDURE — 93000 ELECTROCARDIOGRAM COMPLETE: CPT | Mod: 59

## 2019-04-10 PROCEDURE — 99213 OFFICE O/P EST LOW 20 MIN: CPT

## 2019-04-10 NOTE — END OF VISIT
[FreeTextEntry3] : I was present with the nurse practitioner during the history and exam of the patient. I discussed patient's management with the NP in detail. I reviewed the NP’s note and agree with the documented findings and plan of care. I would like to take this opportunity to thank you for involving me in this patient’s care. Please do not hesitate to contact me if you have any further questions at [phone].\par

## 2019-04-10 NOTE — PHYSICAL EXAM
[General Appearance - Well Developed] : well developed [General Appearance - Well Nourished] : well nourished [Heart Rate And Rhythm] : heart rate and rhythm were normal [Heart Sounds] : normal S1 and S2 [] : no respiratory distress [Respiration, Rhythm And Depth] : normal respiratory rhythm and effort [Exaggerated Use Of Accessory Muscles For Inspiration] : no accessory muscle use [Auscultation Breath Sounds / Voice Sounds] : lungs were clear to auscultation bilaterally [Left Infraclavicular] : left infraclavicular area [Clean] : clean [Dry] : dry [Healing Well] : healing well [Bowel Sounds] : normal bowel sounds [Abdomen Soft] : soft [Nail Clubbing] : no clubbing of the fingernails [Abdomen Tenderness] : non-tender [Cyanosis, Localized] : no localized cyanosis [General Appearance - In No Acute Distress] : no acute distress [Bleeding] : no active bleeding [Foul Odor] : no foul smell [Purulent Drainage] : no purulent drainage [Erythema] : not erythematous [Warm] : not warm [Tender] : not tender [Fluctuant] : not fluctuant [Indurated] : not indurated [FreeTextEntry1] : 2+ pitting edema in b/l LE (chronic)

## 2019-04-10 NOTE — PROCEDURE
[Sinus Bradycardia] : sinus bradycardia [Voltage: ___ volts] : Voltage was [unfilled] volts [Pacemaker] : pacemaker [Longevity: ___ months] : The estimated remaining battery life is [unfilled] months [Lead Imp:  ___ohms] : lead impedance was [unfilled] ohms [Sensing Amplitude ___mv] : sensing amplitude was [unfilled] mv [___ ms] : [unfilled] ms [___V @] : [unfilled] V [None] : none [Programmed for Longevity] : output reprogrammed for improved battery longevity [Asense-Vsense ___ %] : Asense-Vsense [unfilled]% [Apace-Vsense ___ %] : Apace-Vsense [unfilled]% [Asense-Vpace ___ %] : Asense-Vpace [unfilled]% [Apace-Vpace ___ %] : Apace-Vpace [unfilled]% [de-identified] : Atrial rate <30 bpm [de-identified] : Elda TONG [de-identified] : Medtronic [de-identified] : [id number] [de-identified] : 09- [de-identified] : AAI=>DDD [de-identified] : No events [de-identified] : 60/120

## 2019-04-10 NOTE — ASSESSMENT
[FreeTextEntry1] : 88 yo F with history of sinus node dysfunction s/p PPM (2006) s/p gen change for EOL device on 9/13/2018. \par \par PPM interrogation today shows Normal Dual Chamber PPM function, all parameters stable . AP 93.6%,  0.1% . Atrial dependent , no arrhythmias recorded . \par \par She reports c/w prescribed meds. BP well controlled today. \par  \par Patient was seen and examined with Dr. Eller today: We recommended to continue  her current medication regimen, and return for a routine device interrogation in 6 months.    \par \par I have also advised the patient to go to the nearest emergency room if she experiences any chest pain, dyspnea, syncope, or has any other compelling symptoms.\par

## 2019-04-15 ENCOUNTER — INPATIENT (INPATIENT)
Facility: HOSPITAL | Age: 84
LOS: 6 days | Discharge: HOME HEALTH | End: 2019-04-22
Attending: INTERNAL MEDICINE | Admitting: INTERNAL MEDICINE
Payer: MEDICARE

## 2019-04-15 VITALS
RESPIRATION RATE: 18 BRPM | DIASTOLIC BLOOD PRESSURE: 76 MMHG | HEART RATE: 85 BPM | OXYGEN SATURATION: 98 % | TEMPERATURE: 98 F | SYSTOLIC BLOOD PRESSURE: 130 MMHG

## 2019-04-15 DIAGNOSIS — Z95.0 PRESENCE OF CARDIAC PACEMAKER: Chronic | ICD-10-CM

## 2019-04-15 PROCEDURE — 93288 INTERROG EVL PM/LDLS PM IP: CPT | Mod: 26

## 2019-04-15 PROCEDURE — 99285 EMERGENCY DEPT VISIT HI MDM: CPT

## 2019-04-16 DIAGNOSIS — Z90.49 ACQUIRED ABSENCE OF OTHER SPECIFIED PARTS OF DIGESTIVE TRACT: Chronic | ICD-10-CM

## 2019-04-16 LAB
ALBUMIN SERPL ELPH-MCNC: 3.9 G/DL — SIGNIFICANT CHANGE UP (ref 3.5–5.2)
ALP SERPL-CCNC: 104 U/L — SIGNIFICANT CHANGE UP (ref 30–115)
ALT FLD-CCNC: 15 U/L — SIGNIFICANT CHANGE UP (ref 0–41)
ANION GAP SERPL CALC-SCNC: 13 MMOL/L — SIGNIFICANT CHANGE UP (ref 7–14)
APPEARANCE UR: ABNORMAL
AST SERPL-CCNC: 14 U/L — SIGNIFICANT CHANGE UP (ref 0–41)
BACTERIA # UR AUTO: ABNORMAL /HPF
BASOPHILS # BLD AUTO: 0.04 K/UL — SIGNIFICANT CHANGE UP (ref 0–0.2)
BASOPHILS NFR BLD AUTO: 0.4 % — SIGNIFICANT CHANGE UP (ref 0–1)
BILIRUB SERPL-MCNC: 0.4 MG/DL — SIGNIFICANT CHANGE UP (ref 0.2–1.2)
BILIRUB UR-MCNC: NEGATIVE — SIGNIFICANT CHANGE UP
BLD GP AB SCN SERPL QL: SIGNIFICANT CHANGE UP
BUN SERPL-MCNC: 33 MG/DL — HIGH (ref 10–20)
CALCIUM SERPL-MCNC: 8.9 MG/DL — SIGNIFICANT CHANGE UP (ref 8.5–10.1)
CHLORIDE SERPL-SCNC: 107 MMOL/L — SIGNIFICANT CHANGE UP (ref 98–110)
CO2 SERPL-SCNC: 23 MMOL/L — SIGNIFICANT CHANGE UP (ref 17–32)
COLOR SPEC: YELLOW — SIGNIFICANT CHANGE UP
CREAT SERPL-MCNC: 1 MG/DL — SIGNIFICANT CHANGE UP (ref 0.7–1.5)
DIFF PNL FLD: NEGATIVE — SIGNIFICANT CHANGE UP
EOSINOPHIL # BLD AUTO: 0.15 K/UL — SIGNIFICANT CHANGE UP (ref 0–0.7)
EOSINOPHIL NFR BLD AUTO: 1.6 % — SIGNIFICANT CHANGE UP (ref 0–8)
EPI CELLS # UR: ABNORMAL /HPF
GLUCOSE SERPL-MCNC: 121 MG/DL — HIGH (ref 70–99)
GLUCOSE UR QL: NEGATIVE MG/DL — SIGNIFICANT CHANGE UP
HCT VFR BLD CALC: 34.9 % — LOW (ref 37–47)
HGB BLD-MCNC: 11.4 G/DL — LOW (ref 12–16)
IMM GRANULOCYTES NFR BLD AUTO: 0.7 % — HIGH (ref 0.1–0.3)
KETONES UR-MCNC: NEGATIVE — SIGNIFICANT CHANGE UP
LEUKOCYTE ESTERASE UR-ACNC: ABNORMAL
LYMPHOCYTES # BLD AUTO: 2.24 K/UL — SIGNIFICANT CHANGE UP (ref 1.2–3.4)
LYMPHOCYTES # BLD AUTO: 24.6 % — SIGNIFICANT CHANGE UP (ref 20.5–51.1)
MCHC RBC-ENTMCNC: 28.4 PG — SIGNIFICANT CHANGE UP (ref 27–31)
MCHC RBC-ENTMCNC: 32.7 G/DL — SIGNIFICANT CHANGE UP (ref 32–37)
MCV RBC AUTO: 86.8 FL — SIGNIFICANT CHANGE UP (ref 81–99)
MONOCYTES # BLD AUTO: 0.97 K/UL — HIGH (ref 0.1–0.6)
MONOCYTES NFR BLD AUTO: 10.7 % — HIGH (ref 1.7–9.3)
NEUTROPHILS # BLD AUTO: 5.64 K/UL — SIGNIFICANT CHANGE UP (ref 1.4–6.5)
NEUTROPHILS NFR BLD AUTO: 62 % — SIGNIFICANT CHANGE UP (ref 42.2–75.2)
NITRITE UR-MCNC: NEGATIVE — SIGNIFICANT CHANGE UP
NRBC # BLD: 0 /100 WBCS — SIGNIFICANT CHANGE UP (ref 0–0)
NT-PROBNP SERPL-SCNC: 4129 PG/ML — HIGH (ref 0–300)
PH UR: 6 — SIGNIFICANT CHANGE UP (ref 5–8)
PLATELET # BLD AUTO: 231 K/UL — SIGNIFICANT CHANGE UP (ref 130–400)
POTASSIUM SERPL-MCNC: 4.4 MMOL/L — SIGNIFICANT CHANGE UP (ref 3.5–5)
POTASSIUM SERPL-SCNC: 4.4 MMOL/L — SIGNIFICANT CHANGE UP (ref 3.5–5)
PROT SERPL-MCNC: 5.7 G/DL — LOW (ref 6–8)
PROT UR-MCNC: 100 MG/DL
RBC # BLD: 4.02 M/UL — LOW (ref 4.2–5.4)
RBC # FLD: 13.2 % — SIGNIFICANT CHANGE UP (ref 11.5–14.5)
SODIUM SERPL-SCNC: 143 MMOL/L — SIGNIFICANT CHANGE UP (ref 135–146)
SP GR SPEC: 1.01 — SIGNIFICANT CHANGE UP (ref 1.01–1.03)
TROPONIN T SERPL-MCNC: <0.01 NG/ML — SIGNIFICANT CHANGE UP
TYPE + AB SCN PNL BLD: SIGNIFICANT CHANGE UP
UROBILINOGEN FLD QL: 0.2 MG/DL — SIGNIFICANT CHANGE UP (ref 0.2–0.2)
WBC # BLD: 9.1 K/UL — SIGNIFICANT CHANGE UP (ref 4.8–10.8)
WBC # FLD AUTO: 9.1 K/UL — SIGNIFICANT CHANGE UP (ref 4.8–10.8)
WBC UR QL: ABNORMAL /HPF

## 2019-04-16 PROCEDURE — 73502 X-RAY EXAM HIP UNI 2-3 VIEWS: CPT | Mod: 26,RT

## 2019-04-16 PROCEDURE — 73552 X-RAY EXAM OF FEMUR 2/>: CPT | Mod: 26,RT

## 2019-04-16 PROCEDURE — 70450 CT HEAD/BRAIN W/O DYE: CPT | Mod: 26

## 2019-04-16 PROCEDURE — 71045 X-RAY EXAM CHEST 1 VIEW: CPT | Mod: 26

## 2019-04-16 PROCEDURE — 93010 ELECTROCARDIOGRAM REPORT: CPT

## 2019-04-16 PROCEDURE — 93970 EXTREMITY STUDY: CPT | Mod: 26

## 2019-04-16 PROCEDURE — 72110 X-RAY EXAM L-2 SPINE 4/>VWS: CPT | Mod: 26

## 2019-04-16 RX ORDER — LIDOCAINE 4 G/100G
2 CREAM TOPICAL EVERY 24 HOURS
Qty: 0 | Refills: 0 | Status: DISCONTINUED | OUTPATIENT
Start: 2019-04-16 | End: 2019-04-22

## 2019-04-16 RX ORDER — DIPHENOXYLATE HCL/ATROPINE 2.5-.025MG
2 TABLET ORAL
Qty: 0 | Refills: 0 | COMMUNITY

## 2019-04-16 RX ORDER — METOPROLOL TARTRATE 50 MG
25 TABLET ORAL DAILY
Qty: 0 | Refills: 0 | Status: DISCONTINUED | OUTPATIENT
Start: 2019-04-16 | End: 2019-04-22

## 2019-04-16 RX ORDER — FUROSEMIDE 40 MG
40 TABLET ORAL
Qty: 0 | Refills: 0 | Status: DISCONTINUED | OUTPATIENT
Start: 2019-04-16 | End: 2019-04-17

## 2019-04-16 RX ORDER — DIPHENOXYLATE HCL/ATROPINE 2.5-.025MG
1 TABLET ORAL DAILY
Qty: 0 | Refills: 0 | Status: DISCONTINUED | OUTPATIENT
Start: 2019-04-16 | End: 2019-04-17

## 2019-04-16 RX ORDER — MORPHINE SULFATE 50 MG/1
1 CAPSULE, EXTENDED RELEASE ORAL ONCE
Qty: 0 | Refills: 0 | Status: DISCONTINUED | OUTPATIENT
Start: 2019-04-16 | End: 2019-04-16

## 2019-04-16 RX ORDER — LOSARTAN POTASSIUM 100 MG/1
12.5 TABLET, FILM COATED ORAL DAILY
Qty: 0 | Refills: 0 | Status: DISCONTINUED | OUTPATIENT
Start: 2019-04-16 | End: 2019-04-17

## 2019-04-16 RX ORDER — LINEZOLID 600 MG/300ML
600 INJECTION, SOLUTION INTRAVENOUS EVERY 12 HOURS
Qty: 0 | Refills: 0 | Status: DISCONTINUED | OUTPATIENT
Start: 2019-04-16 | End: 2019-04-18

## 2019-04-16 RX ORDER — QUETIAPINE FUMARATE 200 MG/1
12.5 TABLET, FILM COATED ORAL AT BEDTIME
Qty: 0 | Refills: 0 | Status: DISCONTINUED | OUTPATIENT
Start: 2019-04-16 | End: 2019-04-17

## 2019-04-16 RX ORDER — CHLORHEXIDINE GLUCONATE 213 G/1000ML
1 SOLUTION TOPICAL
Qty: 0 | Refills: 0 | Status: DISCONTINUED | OUTPATIENT
Start: 2019-04-16 | End: 2019-04-22

## 2019-04-16 RX ORDER — MESALAMINE 400 MG
1200 TABLET, DELAYED RELEASE (ENTERIC COATED) ORAL
Qty: 0 | Refills: 0 | Status: DISCONTINUED | OUTPATIENT
Start: 2019-04-16 | End: 2019-04-22

## 2019-04-16 RX ORDER — ACETAMINOPHEN 500 MG
650 TABLET ORAL EVERY 6 HOURS
Qty: 0 | Refills: 0 | Status: DISCONTINUED | OUTPATIENT
Start: 2019-04-16 | End: 2019-04-22

## 2019-04-16 RX ORDER — CHOLESTYRAMINE 4 G/9G
4 POWDER, FOR SUSPENSION ORAL DAILY
Qty: 0 | Refills: 0 | Status: DISCONTINUED | OUTPATIENT
Start: 2019-04-16 | End: 2019-04-22

## 2019-04-16 RX ORDER — ENOXAPARIN SODIUM 100 MG/ML
40 INJECTION SUBCUTANEOUS EVERY 24 HOURS
Qty: 0 | Refills: 0 | Status: DISCONTINUED | OUTPATIENT
Start: 2019-04-16 | End: 2019-04-22

## 2019-04-16 RX ADMIN — Medication 650 MILLIGRAM(S): at 06:41

## 2019-04-16 RX ADMIN — QUETIAPINE FUMARATE 12.5 MILLIGRAM(S): 200 TABLET, FILM COATED ORAL at 21:31

## 2019-04-16 RX ADMIN — Medication 1200 MILLIGRAM(S): at 18:00

## 2019-04-16 RX ADMIN — Medication 1 TABLET(S): at 13:12

## 2019-04-16 RX ADMIN — Medication 40 MILLIGRAM(S): at 18:00

## 2019-04-16 RX ADMIN — LINEZOLID 600 MILLIGRAM(S): 600 INJECTION, SOLUTION INTRAVENOUS at 18:00

## 2019-04-16 RX ADMIN — MORPHINE SULFATE 1 MILLIGRAM(S): 50 CAPSULE, EXTENDED RELEASE ORAL at 02:55

## 2019-04-16 RX ADMIN — LIDOCAINE 2 PATCH: 4 CREAM TOPICAL at 15:20

## 2019-04-16 RX ADMIN — ENOXAPARIN SODIUM 40 MILLIGRAM(S): 100 INJECTION SUBCUTANEOUS at 13:12

## 2019-04-16 RX ADMIN — CHLORHEXIDINE GLUCONATE 1 APPLICATION(S): 213 SOLUTION TOPICAL at 09:56

## 2019-04-16 NOTE — ED PROVIDER NOTE - ATTENDING CONTRIBUTION TO CARE
88 yo female with PMH CHF, dementia, ITP, chronic knee pain and OA BIB  c/o increased LE edema and confusion.  states pt has been acting more confused at night x several month but has worsened over last week. States she does not sleep and is up shouting all night and "cannot be reasoned with". Denies any falls or head trauma, weakness, fevers, V/D or abdominal pain. Pt c/o right thigh pain.     VITAL SIGNS: noted  CONSTITUTIONAL: Well-developed; well-nourished; in no acute distress  HEAD: Normocephalic; atraumatic  EYES: PERRL, EOM intact; conjunctiva and sclera clear  ENT: No nasal discharge; airway clear. MMM  NECK: Supple; non tender. No JVD  CARD: S1, S2 normal; +NISHANT. Regular rate and rhythm  RESP: CTAB/L, no wheezes, rales or rhonchi  ABD: Normal bowel sounds; soft; non-distended; non-tender   EXT: Normal ROM. + bilateral LE edema, no erythema or increased warmth, distal pulses intact  NEURO: Alert, oriented x 2. Grossly unremarkable. No focal deficits  SKIN: Skin exam is warm and dry

## 2019-04-16 NOTE — H&P ADULT - NSHPREVIEWOFSYSTEMS_GEN_ALL_CORE
REVIEW OF SYSTEMS:    CONSTITUTIONAL: No weakness or fevers  EYES/ENT: No visual changes  NECK: No pain or stiffness  RESPIRATORY: No cough, wheezing, hemoptysis; No shortness of breath  CARDIOVASCULAR: leg swelling bilateral  GASTROINTESTINAL: No abdominal pain. No nausea or vomiting; No diarrhea or constipation. No bloody or black colored stool  GENITOURINARY: No pain with urination, no increased urinary frequency, no dark o r bloody urine  NEUROLOGICAL: agitation  SKIN: No itching, rashes

## 2019-04-16 NOTE — ED PROVIDER NOTE - PHYSICAL EXAMINATION
GENERAL: Well-nourished, Well-developed. NAD.  HEAD: No visible or palpable bumps or hematomas. No ecchymosis behind ears B/L.  Eyes: PERRLA, EOMI. No asymmetry. No nystagmus. No conjunctival injection. Non-icteric sclera.  ENMT: MMM. No pharyngeal erythema or exudates. Uvula midline. No oral lesions.   Neck: Supple. No FROM  CVS: + pacemaker in place. No reproducible chest wall tenderness. Normal S1, S2. + swooshing sound ausculted suggesting systolic ejection murmur.  RESP: No use of accessory muscles. Chest rise symmetrical with good expansion. Lungs clear to auscultation B/L. No wheezing, rales, or rhonchi auscultated.  GI: Normal auscultation of bowel sounds in all 4 quadrants. Soft, Nontender, Nondistended.   MSK: No visible signs of trauma such as erythema, ecchymosis, or swelling. No bony deformity. FROM of upper and lower extremities B/L. Pelvis stable.  Skin: Warm, Dry. No rashes or lesions.   EXT: + B/L lower extremity edema with no erythema, ecchymosis, or signs of venous stasis. No changes in temperature. Radial and pedal pulses present B/L.   Neuro: AA&O x 3. CNs II-XII grossly intact. Speaking in full sentences. No slurring of speech. No facial droop. No tremors. Sensation grossly intact. Strength 5/5 B/L.   Psych:  Cooperative. Anxious.

## 2019-04-16 NOTE — ED PROVIDER NOTE - OBJECTIVE STATEMENT
86 yo female with PMH of CHF, OA knees B/L, pacemaker, low EF, diverticulitis, immune thrombocytopenia (under Dr. Haskins's care) brought to the ED by  and son stating the patient has become more forgetful and agitated lately and noticed increased swelling to lower extremities B/L x 1 month, but worse over the past week. .  Patient c/o right knee pain.  Patient has chronic swelling to lower extremities B/L due to diagnosis of CHF, but family noticed her legs to appear more swollen then normal. Son spoke to patient's cardiologist, Dr. Jacinto, who increased patient's dose to two tabs and made an appt to see patient in his office next week.  Patient lives at home with , who takes care of the patient, but patient has become agitated and more aggressive at night causing  to have difficulty taking care of the patient. Patient is up all night screaming and  is unable to calm the patient. Patient ambulates with a walker at baseline.  Family denies patient having fever, chills, chest pain, SOB, N/V/D, recent traumatic event or fall, syncope, recent travel, or use of blood thinners.

## 2019-04-16 NOTE — H&P ADULT - HISTORY OF PRESENT ILLNESS
87/F, from home with , uses walker, has hx of HFrEF, PPM, b/l knee OA, diverticulitis, immune thrombocytopenia (Onc - Dr. Haskins) presented to ED c/o being more forgetful and agitated.    More forgetful and agitated. At night pt is more aggressive causing her  to have difficulty taking care of her. Patient is up all night screaming and  is unable to calm the patient    Increased swelling to lower extremities bilateral x 1 month, but worse over the past week. Recently had lasix dose increased to 2 tabs by cardiologist and was planning to see next week in his office.    Patient c/o right knee pain.      Family denies patient having fever, chills, chest pain, SOB, N/V/D, recent traumatic event or fall, syncope, recent travel, or use of blood thinners. 87/F, from home with , uses walker, has hx of HFrEF, PPM, b/l knee OA, diverticulitis, immune thrombocytopenia (Onc - Dr. Haskins) presented to ED c/o being more forgetful and agitated.    More forgetful and agitated. At night pt is more aggressive causing her  to have difficulty taking care of her. Patient is up all night screaming and  is unable to calm the patient    Increased swelling to lower extremities bilateral x 1 month, but worse over the past week. Recently had lasix dose increased to 2 tabs by cardiologist and was planning to see next week in his office.    Patient c/o right knee pain.  Denies any fall or LOC. 87/F, from home with , uses walker/Cane, has hx of HFrEF, PPM, b/l knee OA, diverticulitis, immune thrombocytopenia (Onc - Dr. Haskins), dementia (A+O x1 - person) presented to ED c/o being more forgetful and agitated.    Family reports at night time only pt becomes very agitated, screams in pain - she is unable to be calmed. This has occurred in the past, and now has been ongoing for 2.5 wks. Last night was worse - pt was unable to be consoled so  called daughter & brought pt to ED. She has underlying dementia with worsening memory the last 5 yrs - now at baseline she is A&Ox1 (person). During the day she is calm, and relaxed, though at night she becomes aggitated and aggressiver - screaming in pain in her legs; though no pain during the day.    2 days PTP family noticed increased leg swelling bilaterally, though she has chronic leg swelling, but worse over the past week. They called her cardiologist - and had lasix dose increased to 2 tabs per day and was planning to see next week in his office. No report of SOB or orthopnea.    No reports of bleeding, fevers, rash, chest pain, diarrhea, or recent falls. Pt has freq urination - family attributes to lasix, no pain with urination.    hx from  + patient + children

## 2019-04-16 NOTE — ED PROVIDER NOTE - PMH
Cardiomyopathy    Dementia    Immune thrombocytopenia Cardiomyopathy    CHF (congestive heart failure)    Dementia    Diverticulitis    Ejection fraction < 50%    Immune thrombocytopenia    Pacemaker

## 2019-04-16 NOTE — H&P ADULT - ASSESSMENT
87/F, from home with , uses walker, has hx of HFrEF (29%) + Severe AS, PPM, b/l knee OA, diverticulitis, immune thrombocytopenia (Onc - Dr. Haskins), dementia, presented to ED c/o being more forgetful and agitated.    CXR - no opacities, no effusions (f/u official read)    AMS with +UA  - Ur Cx 9/2018 - >100,000 CFU/ml Enterococcus species + >100,000 CFU/ml Enterococcus faecium (vancomycin resistant)  - VRE sensitive to Daptomycin + Linezolid  - F/ur Ur cx    Dementia    HFrEF (29%) + Severe AS  - BNP 4K; in 9/2018 BNP 23K    Normocytic anemia (Baseline 12-13)  - Iron studies + B12/Folate + Hemoclytic panel + Fecal immune testing  - T/S    ITP - Platelet count at baseline    B/L Knee OA    CHG bath  OOB with assistance  Fall precautions 87/F, from home with , uses walker, has hx of HFrEF (29%) + Severe AS, PPM, b/l knee OA, diverticulitis, immune thrombocytopenia (Onc - Dr. Haskins), dementia, presented to ED c/o being more forgetful and agitated.    CXR - no opacities, no effusions (f/u official read)    AMS + Agitation in pt with underlying dementia with +UA  - [facility] admission 9/2018 - found to have UTI  - Ur Cx 9/2018 - >100,000 CFU/ml Enterococcus species + >100,000 CFU/ml Enterococcus faecium (vancomycin resistant)  - VRE sensitive to Daptomycin + Linezolid  - F/ur Ur cx  - Start Linezolid until Ur Cx returns  - Check B12, Folate, TSH, RPR    Dementia    Chronic Lower ext swelling  - Likely due to chronic venous insufficency - elevate legs + SHY stockings  - No signs of cellulitis  - Check venous duplex    HFrEF (29%) + Severe AS - not in acute exacerbation  - Prior notes reviewed - pt not a candidate for TAVR / CT surgery  - BNP 4K; in 9/2018 BNP 23K  - CXR clear  - Daily wt & I+Os  - C/w metoprolol + ACEi + PO lasix    Normocytic anemia (Baseline 12-13)  - Iron studies + B12/Folate + Hemoclytic panel + Fecal immune testing  - T/S    ITP - Platelet count at baseline    B/L Knee OA    CHG bath  OOB with assistance  Fall precautions  DVT ppx 87/F, from home with , uses walker, has hx of HFrEF (29%) + Severe AS, PPM, b/l knee OA, diverticulitis, immune thrombocytopenia (Onc - Dr. Haskins), dementia, presented to ED c/o being more forgetful and agitated.    CXR - no opacities, no effusions (f/u official read)    Agitation at night in pt with +UA & underlying dementia - UTI vs Sundowning    - Similar admission 9/2018 - found to have UTI  - Ur Cx 9/2018 - >100,000 CFU/ml Enterococcus species + >100,000 CFU/ml Enterococcus faecium (vancomycin resistant)  - VRE sensitive to Daptomycin + Linezolid  - F/ur Ur cx  - Start Linezolid until Ur Cx returns  - Check B12, Folate, TSH, RPR    - Trial of low dose seroquel; repeat EKG in AM      Chronic Lower ext swelling  - Likely due to chronic venous insufficency - elevate legs + SHY stockings  - No signs of cellulitis  - Check venous duplex    HFrEF (29%) + Severe AS - not in acute exacerbation  - Prior notes reviewed - pt not a candidate for TAVR / CT surgery  - BNP 4K; in 9/2018 BNP 23K  - CXR clear  - Daily wt & I+Os  - C/w metoprolol + ACEi + PO lasix (BiD) - change to daily lasix if pt starts to have rise in BUN/Cr or Na+ s/o prerenal ROMIE    B/l Hip + diffuse Lower ext pain - OA? Neuropathic?  - Has degenerative changes and osteopenia on XR of b/l hip + knee; though pt on chronic steroids - will get XR L-S spine  - No complaints now, complaints mostly at night    Hx of Colitis - no active diarrhea now  - Pt follows with GI (Dr. Agustin) - c/w daily prednisone + Lomotil + Apriso  - Check Vit D level  - Start Vit D + Calcium in view of chronic steroid therapy    Normocytic anemia (Baseline 12-13)  - Iron studies + B12/Folate + Hemoclytic panel + Fecal immune testing  - T/S    ITP - Platelet count at baseline    B/L Knee OA - Lidoderm patch    CHG bath  OOB with assistance  Fall precautions  DVT ppx 87/F, from home with , uses walker, has hx of HFrEF (29%) + Severe AS, PPM, b/l knee OA, diverticulitis, immune thrombocytopenia (Onc - Dr. Haskins), dementia, presented to ED c/o being more forgetful and agitated.    CXR - no opacities, no effusions (f/u official read)    Agitation at night in pt with +UA & underlying dementia - UTI vs Sundowning    - Similar admission 9/2018 - found to have UTI  - Ur Cx 9/2018 - >100,000 CFU/ml Enterococcus species + >100,000 CFU/ml Enterococcus faecium (vancomycin resistant)  - VRE sensitive to Daptomycin + Linezolid  - F/ur Ur cx  - Start Linezolid until Ur Cx returns  - Check B12, Folate, TSH, RPR    - Trial of low dose seroquel qHS; repeat EKG in AM    Chronic Lower ext swelling  - Likely due to chronic venous insufficency - elevate legs + SHY stockings  - No signs of cellulitis  - Check venous duplex    HFrEF (29%) + Severe AS - not in acute exacerbation  - Prior notes reviewed - pt not a candidate for TAVR / CT surgery  - BNP 4K; in 9/2018 BNP 23K  - CXR clear  - Daily wt & I+Os  - C/w metoprolol + ACEi + PO lasix (BiD) - change to daily lasix if pt starts to have rise in BUN/Cr or Na+ s/o prerenal ROMIE  - EP eval for PPM interogation - [employer]    B/l Hip + diffuse Lower ext pain - OA? Neuropathic?  - Has degenerative changes and osteopenia on XR of b/l hip + knee; though pt on chronic steroids - will get XR L-S spine  - No complaints now, complaints mostly at night  - Hold off on starting gabapentin - see how pt tolerates seroquel     Hx of Colitis - no active diarrhea now  - Pt follows with GI (Dr. Agustin) - c/w daily prednisone + Lomotil + Apriso  - Check Vit D level  - Start Vit D + Calcium in view of chronic steroid therapy    Normocytic anemia (Baseline 12-13)  - Iron studies + B12/Folate + Hemoclytic panel + Fecal immune testing  - T/S    ITP - Platelet count at baseline    B/L Knee OA - Lidoderm patch    CHG bath  OOB with assistance  Fall precautions  DVT ppx 87/F, from home with , uses walker, has hx of HFrEF (29%) + Severe AS, PPM, b/l knee OA, diverticulitis, immune thrombocytopenia (Onc - Dr. Haskins), dementia, presented to ED c/o being more forgetful and agitated.    CXR - no opacities, no effusions (f/u official read)    Agitation at night in pt with +UA & underlying dementia - uncomplicated cystitis vs sundowning    - Similar admission 9/2018 - found to have UTI  - Ur Cx 9/2018 - >100,000 CFU/ml Enterococcus species + >100,000 CFU/ml Enterococcus faecium (vancomycin resistant)  - VRE sensitive to Daptomycin + Linezolid  - F/ur Ur cx  - Start Linezolid until Ur Cx returns  - Check B12, Folate, TSH, RPR    - Trial of low dose seroquel qHS; repeat EKG in AM    Chronic Lower ext swelling  - Likely due to chronic venous insufficency - elevate legs + SHY stockings  - No signs of cellulitis  - Check venous duplex    HFrEF (29%) + Severe AS - not in acute exacerbation  - Prior notes reviewed - pt not a candidate for TAVR / CT surgery  - BNP 4K; in 9/2018 BNP 23K  - CXR clear  - Daily wt & I+Os  - C/w metoprolol + ACEi + PO lasix (BiD) - change to daily lasix if pt starts to have rise in BUN/Cr or Na+ s/o prerenal ROMIE  - EP eval for PPM interogation - [employer]    B/l Hip + diffuse Lower ext pain - OA? Neuropathic?  - Has degenerative changes and osteopenia on XR of b/l hip + knee; though pt on chronic steroids - will get XR L-S spine  - No complaints now, complaints mostly at night  - Hold off on starting gabapentin - see how pt tolerates seroquel     Hx of Colitis - no active symptoms  - Pt follows with GI (Dr. Agustin) - c/w daily prednisone + Lomotil + Apriso  - Check Vit D level  - Start Vit D + Calcium in view of chronic steroid therapy    Normocytic anemia (Baseline 12-13)  - Iron studies + B12/Folate + Hemoclytic panel + Fecal immune testing  - T/S    ITP - Platelet count at baseline    B/L Knee OA - Lidoderm patch    CHG bath  OOB with assistance  Fall precautions  DVT ppx

## 2019-04-16 NOTE — H&P ADULT - NSHPLABSRESULTS_GEN_ALL_CORE
11.4   9.10  )-----------( 231      ( 2019 01:45 )             34.9       Hemoglobin: 11.4 g/dL ( @ 01:45)          143  |  107  |  33<H>  ----------------------------<  121<H>  4.4   |  23  |  1.0    Ca    8.9      2019 01:45    TPro  5.7<L>  /  Alb  3.9  /  TBili  0.4  /  DBili  x   /  AST  14  /  ALT  15  /  AlkPhos  104          Creatinine Trend: 1.0<--  eGFR if Non African American: 51 mL/min/1.73M2 (19 @ 01:45)  eGFR if : 59 mL/min/1.73M2 (19 @ 01:45)    Urinalysis Basic - ( 2019 01:01 )    Color: Yellow / Appearance: Cloudy / S.015 / pH: x  Gluc: x / Ketone: Negative  / Bili: Negative / Urobili: 0.2 mg/dL   Blood: x / Protein: 100 mg/dL / Nitrite: Negative   Leuk Esterase: Large / RBC: x / WBC 10-25 /HPF   Sq Epi: x / Non Sq Epi: Moderate /HPF / Bacteria: Few /HPF                Hemoglobin A1C         Lactate Trend      CARDIAC MARKERS ( 2019 01:45 )  x     / <0.01 ng/mL / x     / x     / x          Serum Pro-Brain Natriuretic Peptide: 4129 pg/mL (19 @ 01:45)    TTE 2018 -  1. Severely decreased global left ventricular systolic function.   2. Moderate to severe left atrial enlargement.   3. LV Ejection Fraction by Simmons's Method with a biplane EF of 29 %.   4. Spectral Doppler shows impaired relaxation pattern of left   ventricular myocardial filling (Grade I diastolic dysfunction).   5. Moderate pleural effusion in the left lateral region.   6. Moderate mitral valve regurgitation.   7. Mild-moderate tricuspid regurgitation.   8. Mild aortic regurgitation.   9. Peak transaortic gradient is 66.2 mmHg, mean transaortic gradient   equals 30.8 mmHg, the calculated aortic valve area equals 0.62 cm² by the   continuity equation consistent with severe aortic stenosis. 11.4   9.10  )-----------( 231      ( 2019 01:45 )             34.9       Hemoglobin: 11.4 g/dL ( @ 01:45)          143  |  107  |  33<H>  ----------------------------<  121<H>  4.4   |  23  |  1.0    Ca    8.9      2019 01:45    TPro  5.7<L>  /  Alb  3.9  /  TBili  0.4  /  DBili  x   /  AST  14  /  ALT  15  /  AlkPhos  104          Creatinine Trend: 1.0<--  eGFR if Non African American: 51 mL/min/1.73M2 (19 @ 01:45)  eGFR if : 59 mL/min/1.73M2 (19 @ 01:45)    Urinalysis Basic - ( 2019 01:01 )    Color: Yellow / Appearance: Cloudy / S.015 / pH: x  Gluc: x / Ketone: Negative  / Bili: Negative / Urobili: 0.2 mg/dL   Blood: x / Protein: 100 mg/dL / Nitrite: Negative   Leuk Esterase: Large / RBC: x / WBC 10-25 /HPF   Sq Epi: x / Non Sq Epi: Moderate /HPF / Bacteria: Few /HPF                Hemoglobin A1C         Lactate Trend      CARDIAC MARKERS ( 2019 01:45 )  x     / <0.01 ng/mL / x     / x     / x          Serum Pro-Brain Natriuretic Peptide: 4129 pg/mL (19 @ 01:45)    TTE 2018 -  1. Severely decreased global left ventricular systolic function.   2. Moderate to severe left atrial enlargement.   3. LV Ejection Fraction by Simmons's Method with a biplane EF of 29 %.   4. Spectral Doppler shows impaired relaxation pattern of left   ventricular myocardial filling (Grade I diastolic dysfunction).   5. Moderate pleural effusion in the left lateral region.   6. Moderate mitral valve regurgitation.   7. Mild-moderate tricuspid regurgitation.   8. Mild aortic regurgitation.   9. Peak transaortic gradient is 66.2 mmHg, mean transaortic gradient   equals 30.8 mmHg, the calculated aortic valve area equals 0.62 cm² by the   continuity equation consistent with severe aortic stenosis.    CTH - 1.  No CT evidence of acute intracranial pathology.  2.  Chronic microvascular ischemic changes.  XR Femur - Diffuse osteopenia. No acute fracture or dislocation. Extensive degenerative changes of the right knee and hip joints.  XR hip - No evidence of acute fracture or dislocation. Degenerative changes of the bilateral hips and lower lumbar spine. Stable surgical clips overlying the sacrum.  Vascular calcifications are noted

## 2019-04-16 NOTE — ED PROVIDER NOTE - PSH
Artificial cardiac pacemaker Artificial cardiac pacemaker    History of cholecystectomy    History of intestine removal

## 2019-04-16 NOTE — H&P ADULT - NSICDXPASTSURGICALHX_GEN_ALL_CORE_FT
PAST SURGICAL HISTORY:  Artificial cardiac pacemaker     History of cholecystectomy     History of intestine removal

## 2019-04-16 NOTE — H&P ADULT - NSHPOUTPATIENTPROVIDERS_GEN_ALL_CORE
Onc - Dr. Jozef Souza - Dr. Jacinto Onc - Dr. Jozef Souza - Dr. Jacinto  PMD - Dr. Robertson  EP - Dr. Nath

## 2019-04-16 NOTE — H&P ADULT - NSHPPHYSICALEXAM_GEN_ALL_CORE
Vital Signs Last 24 Hrs  T(C): 36.7 (15 Apr 2019 23:34), Max: 36.7 (15 Apr 2019 23:34)  T(F): 98 (15 Apr 2019 23:34), Max: 98 (15 Apr 2019 23:34)  HR: 85 (15 Apr 2019 23:34) (85 - 85)  BP: 130/76 (15 Apr 2019 23:34) (130/76 - 130/76)  RR: 18 (15 Apr 2019 23:34) (18 - 18)  SpO2: 98% (15 Apr 2019 23:34) (98% - 98%) Vital Signs Last 24 Hrs  T(C): 36.7 (15 Apr 2019 23:34), Max: 36.7 (15 Apr 2019 23:34)  T(F): 98 (15 Apr 2019 23:34), Max: 98 (15 Apr 2019 23:34)  HR: 85 (15 Apr 2019 23:34) (85 - 85)  BP: 130/76 (15 Apr 2019 23:34) (130/76 - 130/76)  RR: 18 (15 Apr 2019 23:34) (18 - 18)  SpO2: 98% (15 Apr 2019 23:34) (98% - 98%)    PHYSICAL EXAM:  GENERAL: NAD, speaks in full sentences, no signs of respiratory distress  HEAD:  Atraumatic, Normocephalic  EYES: EOMI, PERRLA, non-icteric, no injected sclera  NECK: Supple  CHEST/LUNG: Clear to auscultation bilaterally; No wheeze; No crackles; No accessory muscles used  HEART: Regular rate and rhythm; + murmur, left chest wall scar from PPM  ABDOMEN: Soft, Nontender, Nondistended; Bowel sounds present; No guarding  EXTREMITIES:  b/l pitting edema to b/l knees  PSYCH: AAOx1 (person)  NEUROLOGY: non-focal, follows all commands  SKIN: No rashes or lesions Vital Signs Last 24 Hrs  T(C): 36.7 (15 Apr 2019 23:34), Max: 36.7 (15 Apr 2019 23:34)  T(F): 98 (15 Apr 2019 23:34), Max: 98 (15 Apr 2019 23:34)  HR: 85 (15 Apr 2019 23:34) (85 - 85)  BP: 130/76 (15 Apr 2019 23:34) (130/76 - 130/76)  RR: 18 (15 Apr 2019 23:34) (18 - 18)  SpO2: 98% (15 Apr 2019 23:34) (98% - 98%)    PHYSICAL EXAM:  GENERAL: NAD, speaks in full sentences, no signs of respiratory distress  HEAD:  Atraumatic, Normocephalic  EYES: EOMI, PERRLA, non-icteric, no injected sclera  NECK: Supple  CHEST/LUNG: Clear to auscultation bilaterally; No wheeze; No crackles; No accessory muscles used  HEART/CVS: Regular rate and rhythm; A IV/VI syst murmur over aortic and mitral areas, left chest wall scar from PPM  ABDOMEN/GI: Soft, Nontender, Nondistended; Bowel sounds present; No guarding  EXTREMITIES:  b/l pitting edema to b/l knees  PSYCH: AAOx1 (person)  NEUROLOGY: non-focal, follows all commands  SKIN: No rashes or lesions

## 2019-04-16 NOTE — ED PROVIDER NOTE - NS ED ROS FT
Constitutional: (-) fever (-) malaise (-) diaphoresis (-) chills (-) wt. loss (-) bodyaches (-) night sweats  Eyes: (-) visual changes  ENMT: (-) nasal or chest congestion (-) runny nose (-) sore throat (-) hoarseness (-) neck pain (-) neck stiffness  Cardiac: (-) chest pain (-) syncope  Respiratory: (-) cough (-) SOB  GI: (-) nausea (-) vomiting (-) diarrhea (-) abdominal pain  : (-) dysuria (-) hematuria   MS: (+) knee pain (-) back pain   Neuro: (+) AMS (-) headache (-) dizziness (-) numbness/tingling to extremities B/L  Skin: (+) B/L leg swelling (-) rash   Psychiatric: (+) agitation   Except as documented in the HPI, all other systems are negative.

## 2019-04-16 NOTE — H&P ADULT - NSICDXPASTMEDICALHX_GEN_ALL_CORE_FT
PAST MEDICAL HISTORY:  Cardiomyopathy     CHF (congestive heart failure) HFrEF    Dementia     Diverticulitis     Ejection fraction < 50%     Immune thrombocytopenia     Intestinal perforation     Pacemaker

## 2019-04-16 NOTE — ED ADULT NURSE NOTE - NSIMPLEMENTINTERV_GEN_ALL_ED
Implemented All Fall with Harm Risk Interventions:  [place] to call system. Call bell, personal items and telephone within reach. Instruct patient to call for assistance. Room bathroom lighting operational. Non-slip footwear when patient is off stretcher. Physically safe environment: no spills, clutter or unnecessary equipment. Stretcher in lowest position, wheels locked, appropriate side rails in place. Provide visual cue, wrist band, yellow gown, etc. Monitor gait and stability. Monitor for mental status changes and reorient to person, place, and time. Review medications for side effects contributing to fall risk. Reinforce activity limits and safety measures with patient and family. Provide visual clues: red socks.

## 2019-04-16 NOTE — ED PROVIDER NOTE - CLINICAL SUMMARY MEDICAL DECISION MAKING FREE TEXT BOX
86 yo female BIB family for increased swelling and agitation worsening over several months. Pt with elevated BNP, negative head CT, admitted for further treatment likely worsening dementia as family unable to manage sx at home.

## 2019-04-16 NOTE — H&P ADULT - ATTENDING COMMENTS
Patient seen and examined independently. Resident's H & P reviewed. Agree with the findings and plan of care except,    An 87 years old female was brought in for worsening forgetfulness and agitation.    WBC: 9.10  BUN/Cr. : 33/1.0  CXR: NAPD  U/A: Leukocyte esterase: Large. Blood large  CT head: Ch. microvascular changes  EKG: NSR @ 73/min. NS ST, T changes. (Interpreted by me)    ASSESSMENT:    1. Encephalopathy  2. UTI  3. Ch. HFrEF  4. H/O Immune Thrombocytopenia  5. Senile Dementia    PLAN:    . H/O VRE UTI during last admission on 9/2018. Will emperically start her on Linezolid and follow the cxs  . ID eval. Check urine cxs  . Venous doppler LE  . Cont her home meds. Patient seen and examined independently. Resident's H & P reviewed. Agree with the findings and plan of care except,    An 87 years old female was brought in for worsening forgetfulness and agitation.    WBC: 9.10  BUN/Cr. : 33/1.0  CXR: NAPD  U/A: Leukocyte esterase: Large. Blood large  CT head: Ch. microvascular changes  EKG: NSR @ 73/min. NS ST, T changes. (Interpreted by me)    ASSESSMENT:    1. Encephalopathy  2. UTI  3. Ch. HFrEF  4. H/O Immune Thrombocytopenia  5. Senile Dementia    PLAN:    . H/O VRE UTI during last admission on 9/2018. Will empirically start her on Linezolid and follow the cxs  . ID eval. Check urine cxs  . Venous doppler LE negative for DVT  . Cont her home meds.  . Plan of care D/W the daughter on bedside.

## 2019-04-16 NOTE — ED ADULT NURSE NOTE - OBJECTIVE STATEMENT
pt is an 88 yo F pmHx of cardiomyopathy, immune thrombocytopenia, and dementia presents w/ bilat leg swelling and pain. pt stated she has been waking up with pain in her legs. pt family stated the pts legs have "looked more swollen then usual."

## 2019-04-17 DIAGNOSIS — G20 PARKINSON'S DISEASE: ICD-10-CM

## 2019-04-17 PROBLEM — I50.9 HEART FAILURE, UNSPECIFIED: Chronic | Status: ACTIVE | Noted: 2019-04-16

## 2019-04-17 LAB
24R-OH-CALCIDIOL SERPL-MCNC: 19 NG/ML — LOW (ref 30–80)
ALBUMIN SERPL ELPH-MCNC: 3.9 G/DL — SIGNIFICANT CHANGE UP (ref 3.5–5.2)
ALP SERPL-CCNC: 97 U/L — SIGNIFICANT CHANGE UP (ref 30–115)
ALT FLD-CCNC: 15 U/L — SIGNIFICANT CHANGE UP (ref 0–41)
ANION GAP SERPL CALC-SCNC: 17 MMOL/L — HIGH (ref 7–14)
AST SERPL-CCNC: 18 U/L — SIGNIFICANT CHANGE UP (ref 0–41)
BILIRUB SERPL-MCNC: 0.5 MG/DL — SIGNIFICANT CHANGE UP (ref 0.2–1.2)
BUN SERPL-MCNC: 31 MG/DL — HIGH (ref 10–20)
CALCIUM SERPL-MCNC: 9.3 MG/DL — SIGNIFICANT CHANGE UP (ref 8.5–10.1)
CHLORIDE SERPL-SCNC: 105 MMOL/L — SIGNIFICANT CHANGE UP (ref 98–110)
CHOLEST SERPL-MCNC: 170 MG/DL — SIGNIFICANT CHANGE UP (ref 100–200)
CO2 SERPL-SCNC: 21 MMOL/L — SIGNIFICANT CHANGE UP (ref 17–32)
CREAT SERPL-MCNC: 1.2 MG/DL — SIGNIFICANT CHANGE UP (ref 0.7–1.5)
CULTURE RESULTS: SIGNIFICANT CHANGE UP
ESTIMATED AVERAGE GLUCOSE: 126 MG/DL — HIGH (ref 68–114)
FERRITIN SERPL-MCNC: 677 NG/ML — HIGH (ref 15–150)
FOLATE SERPL-MCNC: >20 NG/ML — SIGNIFICANT CHANGE UP
GLUCOSE SERPL-MCNC: 158 MG/DL — HIGH (ref 70–99)
HAPTOGLOB SERPL-MCNC: 314 MG/DL — HIGH (ref 34–200)
HBA1C BLD-MCNC: 6 % — HIGH (ref 4–5.6)
HCT VFR BLD CALC: 39.6 % — SIGNIFICANT CHANGE UP (ref 37–47)
HDLC SERPL-MCNC: 78 MG/DL — SIGNIFICANT CHANGE UP
HGB BLD-MCNC: 12.8 G/DL — SIGNIFICANT CHANGE UP (ref 12–16)
IRON SATN MFR SERPL: 13 % — LOW (ref 15–50)
IRON SATN MFR SERPL: 31 UG/DL — LOW (ref 35–150)
LDH SERPL L TO P-CCNC: 337 — HIGH (ref 50–242)
LIPID PNL WITH DIRECT LDL SERPL: 82 MG/DL — SIGNIFICANT CHANGE UP (ref 4–129)
MAGNESIUM SERPL-MCNC: 2 MG/DL — SIGNIFICANT CHANGE UP (ref 1.8–2.4)
MCHC RBC-ENTMCNC: 28.5 PG — SIGNIFICANT CHANGE UP (ref 27–31)
MCHC RBC-ENTMCNC: 32.3 G/DL — SIGNIFICANT CHANGE UP (ref 32–37)
MCV RBC AUTO: 88.2 FL — SIGNIFICANT CHANGE UP (ref 81–99)
NRBC # BLD: 0 /100 WBCS — SIGNIFICANT CHANGE UP (ref 0–0)
PHOSPHATE SERPL-MCNC: 4.9 MG/DL — SIGNIFICANT CHANGE UP (ref 2.1–4.9)
PLATELET # BLD AUTO: 267 K/UL — SIGNIFICANT CHANGE UP (ref 130–400)
POTASSIUM SERPL-MCNC: 4.7 MMOL/L — SIGNIFICANT CHANGE UP (ref 3.5–5)
POTASSIUM SERPL-SCNC: 4.7 MMOL/L — SIGNIFICANT CHANGE UP (ref 3.5–5)
PROT SERPL-MCNC: 5.7 G/DL — LOW (ref 6–8)
RBC # BLD: 4.49 M/UL — SIGNIFICANT CHANGE UP (ref 4.2–5.4)
RBC # BLD: 4.56 M/UL — SIGNIFICANT CHANGE UP (ref 4.2–5.4)
RBC # FLD: 13.4 % — SIGNIFICANT CHANGE UP (ref 11.5–14.5)
RETICS #: 61.6 K/UL — SIGNIFICANT CHANGE UP (ref 25–125)
RETICS/RBC NFR: 1.4 % — SIGNIFICANT CHANGE UP (ref 0.5–1.5)
SODIUM SERPL-SCNC: 143 MMOL/L — SIGNIFICANT CHANGE UP (ref 135–146)
SPECIMEN SOURCE: SIGNIFICANT CHANGE UP
TIBC SERPL-MCNC: 243 UG/DL — SIGNIFICANT CHANGE UP (ref 220–430)
TOTAL CHOLESTEROL/HDL RATIO MEASUREMENT: 2.2 RATIO — LOW (ref 4–5.5)
TRIGL SERPL-MCNC: 118 MG/DL — SIGNIFICANT CHANGE UP (ref 10–149)
TSH SERPL-MCNC: 2.48 UIU/ML — SIGNIFICANT CHANGE UP (ref 0.27–4.2)
TYPE + AB SCN PNL BLD: SIGNIFICANT CHANGE UP
UIBC SERPL-MCNC: 212 UG/DL — SIGNIFICANT CHANGE UP (ref 110–370)
VIT B12 SERPL-MCNC: 383 PG/ML — SIGNIFICANT CHANGE UP (ref 232–1245)
WBC # BLD: 8.93 K/UL — SIGNIFICANT CHANGE UP (ref 4.8–10.8)
WBC # FLD AUTO: 8.93 K/UL — SIGNIFICANT CHANGE UP (ref 4.8–10.8)

## 2019-04-17 PROCEDURE — 93010 ELECTROCARDIOGRAM REPORT: CPT

## 2019-04-17 RX ORDER — QUETIAPINE FUMARATE 200 MG/1
25 TABLET, FILM COATED ORAL
Qty: 0 | Refills: 0 | Status: DISCONTINUED | OUTPATIENT
Start: 2019-04-17 | End: 2019-04-17

## 2019-04-17 RX ORDER — LOSARTAN POTASSIUM 100 MG/1
12.5 TABLET, FILM COATED ORAL DAILY
Qty: 0 | Refills: 0 | Status: DISCONTINUED | OUTPATIENT
Start: 2019-04-17 | End: 2019-04-22

## 2019-04-17 RX ORDER — QUETIAPINE FUMARATE 200 MG/1
12.5 TABLET, FILM COATED ORAL ONCE
Qty: 0 | Refills: 0 | Status: COMPLETED | OUTPATIENT
Start: 2019-04-17 | End: 2019-04-17

## 2019-04-17 RX ORDER — FUROSEMIDE 40 MG
40 TABLET ORAL DAILY
Qty: 0 | Refills: 0 | Status: DISCONTINUED | OUTPATIENT
Start: 2019-04-17 | End: 2019-04-18

## 2019-04-17 RX ORDER — QUETIAPINE FUMARATE 200 MG/1
25 TABLET, FILM COATED ORAL ONCE
Qty: 0 | Refills: 0 | Status: COMPLETED | OUTPATIENT
Start: 2019-04-17 | End: 2019-04-17

## 2019-04-17 RX ADMIN — CHOLESTYRAMINE 4 GRAM(S): 4 POWDER, FOR SUSPENSION ORAL at 08:46

## 2019-04-17 RX ADMIN — Medication 25 MILLIGRAM(S): at 05:30

## 2019-04-17 RX ADMIN — Medication 40 MILLIGRAM(S): at 05:22

## 2019-04-17 RX ADMIN — Medication 1200 MILLIGRAM(S): at 17:33

## 2019-04-17 RX ADMIN — LINEZOLID 600 MILLIGRAM(S): 600 INJECTION, SOLUTION INTRAVENOUS at 05:29

## 2019-04-17 RX ADMIN — ENOXAPARIN SODIUM 40 MILLIGRAM(S): 100 INJECTION SUBCUTANEOUS at 12:42

## 2019-04-17 RX ADMIN — Medication 1 TABLET(S): at 12:41

## 2019-04-17 RX ADMIN — LINEZOLID 600 MILLIGRAM(S): 600 INJECTION, SOLUTION INTRAVENOUS at 17:33

## 2019-04-17 RX ADMIN — Medication 5 MILLIGRAM(S): at 05:30

## 2019-04-17 RX ADMIN — QUETIAPINE FUMARATE 25 MILLIGRAM(S): 200 TABLET, FILM COATED ORAL at 05:21

## 2019-04-17 RX ADMIN — LOSARTAN POTASSIUM 12.5 MILLIGRAM(S): 100 TABLET, FILM COATED ORAL at 08:46

## 2019-04-17 RX ADMIN — Medication 1200 MILLIGRAM(S): at 05:22

## 2019-04-17 NOTE — CONSULT NOTE ADULT - ASSESSMENT
86YO F with PMHx of HFrEF (29%) + Severe AS, PPM, b/l knee OA, diverticulitis, immune thrombocytopenia, dementia, presented to ED w/agitation and worsening forgetfulness.    IMPRESSION:     #Acute cystitis w/infectious encephalopathy:     #PLAN:  - F/U Urine culture  - Continue Linezolid until   - Neuro consult 86YO F with PMHx of HFrEF (29%) + Severe AS, PPM, b/l knee OA, diverticulitis, immune thrombocytopenia, dementia, presented to ED w/agitation and worsening forgetfulness.    IMPRESSION:     #Low suspicion for UTI    #PLAN:  - F/U Urine culture  - Continue Linezolid 86YO F with PMHx of HFrEF (29%) + Severe AS, PPM, b/l knee OA, diverticulitis, immune thrombocytopenia, dementia, presented to ED w/agitation and worsening forgetfulness.    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

## 2019-04-17 NOTE — PROGRESS NOTE ADULT - SUBJECTIVE AND OBJECTIVE BOX
SHELLEY PETIT  87y  Female      Patient is a 87y old  Female who presents with a chief complaint of agitation, AMS at night, uncomplicated cystitis (16 Apr 2019 09:37)      INTERVAL HPI/ OVERNIGHT EVENTS:  87/F, from home with , uses walker/Cane, has hx of HFrEF, PPM, b/l knee OA, diverticulitis, immune thrombocytopenia , dementia (A+O x1 - person) presented to ED c/o being more forgetful and agitated.  overnight patient was agitated again was given Seroquel remains afebrile and hemodynamically stable.    T(C): 36.6 (04-17-19 @ 07:25), Max: 36.6 (04-17-19 @ 07:25)  HR: 85 (04-17-19 @ 07:25) (60 - 85)  BP: 150/98 (04-17-19 @ 07:25) (137/60 - 159/66)  RR: 18 (04-17-19 @ 07:25) (18 - 19)  SpO2: 100% (04-17-19 @ 07:25) (98% - 100%)      PHYSICAL EXAM  not oriented to time and place  chest clear bilaterally  cvs s1 and s2 no added sound  abdomen soft lax no organomegaly  no pedal edema  +peripheral pulses      LABS:  04-17    143  |  105  |  31<H>  ----------------------------<  158<H>  4.7   |  21  |  1.2    Ca    9.3      17 Apr 2019 07:41  Phos  4.9     04-17  Mg     2.0     04-17    TPro  5.7<L>  /  Alb  3.9  /  TBili  0.5  /  DBili  x   /  AST  18  /  ALT  15  /  AlkPhos  97  04-17                          12.8   8.93  )-----------( 267      ( 17 Apr 2019 07:41 )             39.6       RADIOLOGY & ADDITIONAL TESTS:   VA Duplex Lower Ext Vein Scan, Bilat (04.16.19 @ 11:33) >  Impression:    No evidence of deep venous thrombosis or superficial thrombophlebitis in   the bilateral lower extremities.    Left Baker's cyst as measured above    < from: Xray Chest 1 View-PORTABLE IMMEDIATE (04.16.19 @ 02:15) >  Impression:      No radiographic evidence of acute cardiopulmonary disease.    < end of copied text >      Imaging Personally Reviewed:  [ ] YES  [ ] NO  acetaminophen    Suspension .. 650 milliGRAM(s) Oral every 6 hours PRN  calcium carbonate 1250 mG  + Vitamin D (OsCal 500 + D) 1 Tablet(s) Oral daily  chlorhexidine 4% Liquid 1 Application(s) Topical <User Schedule>  cholestyramine Powder (Sugar-Free) 4 Gram(s) Oral daily  diphenoxylate/atropine 1 Tablet(s) Oral daily  enoxaparin Injectable 40 milliGRAM(s) SubCutaneous every 24 hours  furosemide    Tablet 40 milliGRAM(s) Oral two times a day  lidocaine   Patch 2 Patch Transdermal every 24 hours PRN  linezolid    Tablet 600 milliGRAM(s) Oral every 12 hours  losartan 12.5 milliGRAM(s) Oral daily  mesalamine DR Capsule 1200 milliGRAM(s) Oral two times a day  metoprolol succinate ER 25 milliGRAM(s) Oral daily  predniSONE   Tablet 5 milliGRAM(s) Oral daily  QUEtiapine 25 milliGRAM(s) Oral two times a day

## 2019-04-17 NOTE — BEHAVIORAL HEALTH ASSESSMENT NOTE - NSBHCHARTREVIEWLAB_PSY_A_CORE FT
143  |  105  |  31<H>  ----------------------------<  158<H>  4.7   |  21  |  1.2    Ca    9.3      2019 07:41  Phos  4.9     04-17  Mg     2.0     -17    TPro  5.7<L>  /  Alb  3.9  /  TBili  0.5  /  DBili  x   /  AST  18  /  ALT  15  /  AlkPhos  97  04    143  |  105  |  31<H>  ----------------------------<  158<H>  4.7   |  21  |  1.2    Ca    9.3      2019 07:41  Phos  4.9     04-17  Mg     2.0     -17    TPro  5.7<L>  /  Alb  3.9  /  TBili  0.5  /  DBili  x   /  AST  18  /  ALT  15  /  AlkPhos  97        Urinalysis Basic - ( 2019 01:01 )    Color: Yellow / Appearance: Cloudy / S.015 / pH: x  Gluc: x / Ketone: Negative  / Bili: Negative / Urobili: 0.2 mg/dL   Blood: x / Protein: 100 mg/dL / Nitrite: Negative   Leuk Esterase: Large / RBC: x / WBC 10-25 /HPF   Sq Epi: x / Non Sq Epi: Moderate /HPF / Bacteria: Few /HPF

## 2019-04-17 NOTE — BEHAVIORAL HEALTH ASSESSMENT NOTE - HPI (INCLUDE ILLNESS QUALITY, SEVERITY, DURATION, TIMING, CONTEXT, MODIFYING FACTORS, ASSOCIATED SIGNS AND SYMPTOMS)
87 female with history of undiagnosed dementia, no other prior psychiatric history, admitted to medicine after presenting in the ED with agitation. She was has been diagnosed with a UTI.    On interview, patient is alert and oriented to name, year (after looking at board). She states that she is in her parent's house. She is upset, because "I would like to use the bathroom, but the bells keep going off" Nursing staff aware. She is unable to recall context of her admission. When asked questions about her mood she states, "my parents were just here" She denies symptoms of psychosis.    Spoke to patient's  and son. They state that the patient started showing issues with memory impairment over the past year. At baseline, she is alert and oriented to name, location (if she is at home), but not date. They state that the patient has had episodes of agitation marked by waking up in the middle of the night and screaming in 87 female with history of undiagnosed dementia, no other prior psychiatric history, admitted to medicine after presenting in the ED with agitation. She was has been diagnosed with a UTI.    On interview, patient is alert and oriented to name, year (after looking at board). She states that she is in her parent's house. She is upset, because "I would like to use the bathroom, but the bells keep going off" Nursing staff aware. She is unable to recall context of her admission. When asked questions about her mood she states, "my parents were just here" She denies symptoms of psychosis.    Spoke to patient's  and son. They state that the patient started showing issues with memory impairment over the past year. At baseline, she is alert and oriented to name, location (if she is at home), but not date. They state that the patient has had episodes of agitation marked by waking up in the middle of the night and screaming in pain in her legs. She has had recent leg swelling and has a history of osteoarthritis in her knees. This past week her agitation was ongoing for several days and the most intense which is what resulted in hospital presentation. Agitation occurred primarily at night.  They deny aggressive or self injurious behavior in the patient.    Patient was started on Seroquel 25mg BID to target her agitation by primary team on admission.

## 2019-04-17 NOTE — CONSULT NOTE ADULT - SUBJECTIVE AND OBJECTIVE BOX
SHELLEY PETIT  87y, Female  Allergy: No Known Allergies      HPI:  88 YO F from home with , uses walker/Cane with PMHx of HFrEF, PPM, b/l knee OA, diverticulitis, immune thrombocytopenia (Onc - Dr. Haskins), dementia (A+O x1 - person) presented to ED with worsening forgetfulness and agitation.    Family reports at night time only pt becomes very agitated, screams in pain, she is unable to be calmed. This has occurred in the past, and now has been ongoing for 2.5 wks. Prior to presentation, pt was unable to be consoled so  called daughter & brought pt to ED. She has underlying dementia with worsening memory for the last 5 yrs with a baseline of A&Ox1 (person). During the day she is calm and relaxed, but becomes aggressive and agitated at night, screaming in pain in her legs; although she has no pain during the day.    2 days prior to presentation, the family noticed worsening of chronic B/L LE edema. They called her cardiologist, who increased the dose of her lasix and scheduled an appointment for next week in his office. ROS was positive for frequent. urination likely due to increased lasix dose. She denied dysuria, SOB, orthopnea, bleeding, fevers, rash, chest pain, diarrhea, or recent falls.   Hx was obtained from the patient, her  and children (2019 09:37)    FAMILY HISTORY:  No pertinent family history in first degree relatives    PAST MEDICAL & SURGICAL HISTORY:  Intestinal perforation  Diverticulitis  Ejection fraction < 50%  Pacemaker  CHF (congestive heart failure): HFrEF  Dementia  Cardiomyopathy  Immune thrombocytopenia  History of intestine removal  History of cholecystectomy  Artificial cardiac pacemaker      ROS negative except as per HPI    VITALS:  T(F): 97.9, Max: 97.9 (19 @ 07:25)  HR: 85  BP: 150/98  RR: 18Vital Signs Last 24 Hrs  T(C): 36.6 (2019 07:25), Max: 36.6 (2019 07:25)  T(F): 97.9 (2019 07:25), Max: 97.9 (2019 07:25)  HR: 85 (2019 07:25) (60 - 85)  BP: 150/98 (2019 07:25) (137/60 - 159/66)  BP(mean): --  RR: 18 (2019 07:25) (18 - 19)  SpO2: 100% (2019 07:25) (98% - 100%)    PHYSICAL EXAM:  General:  Head/Neck:  Heart:  Lungs:  Abd:  Extremities:  Skin:    TESTS & MEASUREMENTS:                        12.8   8.93  )-----------( 267      ( 2019 07:41 )             39.6         143  |  105  |  31<H>  ----------------------------<  158<H>  4.7   |  21  |  1.2    Ca    9.3      2019 07:41  Phos  4.9       Mg     2.0         TPro  5.7<L>  /  Alb  3.9  /  TBili  0.5  /  DBili  x   /  AST  18  /  ALT  15  /  AlkPhos  97      LIVER FUNCTIONS - ( 2019 07:41 )  Alb: 3.9 g/dL / Pro: 5.7 g/dL / ALK PHOS: 97 U/L / ALT: 15 U/L / AST: 18 U/L / GGT: x             Urinalysis Basic - ( 2019 01:01 )    Color: Yellow / Appearance: Cloudy / S.015 / pH: x  Gluc: x / Ketone: Negative  / Bili: Negative / Urobili: 0.2 mg/dL   Blood: x / Protein: 100 mg/dL / Nitrite: Negative   Leuk Esterase: Large / RBC: x / WBC 10-25 /HPF   Sq Epi: x / Non Sq Epi: Moderate /HPF / Bacteria: Few /HPF          RADIOLOGY & ADDITIONAL TESTS:    1.  No CT evidence of acute intracranial pathology.      2.  Chronic microvascular ischemic changes.    ANTIBIOTICS:  linezolid    Tablet   600 milliGRAM(s) Oral (19 @ 05:29)   600 milliGRAM(s) Oral (19 @ 18:00) SHELLEY PETIT  87y, Female  Allergy: No Known Allergies      HPI:  88 YO F from home with , uses walker/Cane with PMHx of HFrEF, PPM, b/l knee OA, diverticulitis, immune thrombocytopenia (Onc - Dr. Haskins), dementia (A+O x1 - person) presented to ED with worsening forgetfulness and agitation.    Family reports at night time only pt becomes very agitated, screams in pain, she is unable to be calmed. This has occurred in the past, and now has been ongoing for 2.5 wks. Prior to presentation, pt was unable to be consoled so  called daughter & brought pt to ED. She has underlying dementia with worsening memory for the last 5 yrs with a baseline of A&Ox1 (person). During the day she is calm and relaxed, but becomes aggressive and agitated at night, screaming in pain in her legs; although she has no pain during the day.    2 days prior to presentation, the family noticed worsening of chronic B/L LE edema. They called her cardiologist, who increased the dose of her lasix and scheduled an appointment for next week in his office. ROS was positive for frequent. urination likely due to increased lasix dose. She denied dysuria, SOB, orthopnea, bleeding, fevers, rash, chest pain, diarrhea, or recent falls.   Hx was obtained from the patient, her  and children (2019 09:37)    FAMILY HISTORY:  No pertinent family history in first degree relatives    PAST MEDICAL & SURGICAL HISTORY:  Intestinal perforation  Diverticulitis  Ejection fraction < 50%  Pacemaker  CHF (congestive heart failure): HFrEF  Dementia  Cardiomyopathy  Immune thrombocytopenia  History of intestine removal  History of cholecystectomy  Artificial cardiac pacemaker      ROS negative except as per HPI    VITALS:  T(F): 97.9, Max: 97.9 (19 @ 07:25)  HR: 85  BP: 150/98  RR: 18Vital Signs Last 24 Hrs  T(C): 36.6 (2019 07:25), Max: 36.6 (2019 07:25)  T(F): 97.9 (2019 07:25), Max: 97.9 (2019 07:25)  HR: 85 (2019 07:25) (60 - 85)  BP: 150/98 (2019 07:25) (137/60 - 159/66)  BP(mean): --  RR: 18 (2019 07:25) (18 - 19)  SpO2: 100% (2019 07:25) (98% - 100%)    PHYSICAL EXAM:  General: Pt in NAD, lying comfortably in bed, A&Ox3  Head/Neck: NC/AT, supple, no LAD  Heart: Normal S1,S2, systolic murmur  Lungs: CTA B/L, no rales or rhonchi  Abd: ND/NT, +BS, negative llyoid's sign  Extremities: B/L LE 3+ edema  Skin: intact    TESTS & MEASUREMENTS:                        12.8   8.93  )-----------( 267      ( 2019 07:41 )             39.6         143  |  105  |  31<H>  ----------------------------<  158<H>  4.7   |  21  |  1.2    Ca    9.3      2019 07:41  Phos  4.9       Mg     2.0         TPro  5.7<L>  /  Alb  3.9  /  TBili  0.5  /  DBili  x   /  AST  18  /  ALT  15  /  AlkPhos  97      LIVER FUNCTIONS - ( 2019 07:41 )  Alb: 3.9 g/dL / Pro: 5.7 g/dL / ALK PHOS: 97 U/L / ALT: 15 U/L / AST: 18 U/L / GGT: x             Urinalysis Basic - ( 2019 01:01 )    Color: Yellow / Appearance: Cloudy / S.015 / pH: x  Gluc: x / Ketone: Negative  / Bili: Negative / Urobili: 0.2 mg/dL   Blood: x / Protein: 100 mg/dL / Nitrite: Negative   Leuk Esterase: Large / RBC: x / WBC 10-25 /HPF   Sq Epi: x / Non Sq Epi: Moderate /HPF / Bacteria: Few /HPF          RADIOLOGY & ADDITIONAL TESTS:    1.  No CT evidence of acute intracranial pathology.      2.  Chronic microvascular ischemic changes.    ANTIBIOTICS:  linezolid    Tablet   600 milliGRAM(s) Oral (19 @ 05:29)   600 milliGRAM(s) Oral (19 @ 18:00) SHELLEY PETIT  87y, Female  Allergy: No Known Allergies      HPI:  86 YO F from home with , uses walker/Cane with PMHx of HFrEF, PPM, b/l knee OA, diverticulitis, immune thrombocytopenia (Onc - Dr. Haskins), dementia (A+O x1 - person) presented to ED with worsening forgetfulness and agitation.    Family reports at night time only pt becomes very agitated, screams in pain, she is unable to be calmed. This has occurred in the past, and now has been ongoing for 2.5 wks. Prior to presentation, pt was unable to be consoled so  called daughter & brought pt to ED. She has underlying dementia with worsening memory for the last 5 yrs with a baseline of A&Ox1 (person). During the day she is calm and relaxed, but becomes aggressive and agitated at night, screaming in pain in her legs; although she has no pain during the day.    2 days prior to presentation, the family noticed worsening of chronic B/L LE edema. They called her cardiologist, who increased the dose of her lasix and scheduled an appointment for next week in his office. ROS was positive for frequent. urination likely due to increased lasix dose. She denied dysuria, SOB, orthopnea, bleeding, fevers, rash, chest pain, diarrhea, or recent falls.   Hx was obtained from the patient, her  and children (2019 09:37)    FAMILY HISTORY:  No pertinent family history in first degree relatives    PAST MEDICAL & SURGICAL HISTORY:  Intestinal perforation  Diverticulitis  Ejection fraction < 50%  Pacemaker  CHF (congestive heart failure): HFrEF  Dementia  Cardiomyopathy  Immune thrombocytopenia  History of intestine removal  History of cholecystectomy  Artificial cardiac pacemaker      ROS negative except as per HPI    VITALS:  T(F): 97.9, Max: 97.9 (19 @ 07:25)  HR: 85  BP: 150/98  RR: 18Vital Signs Last 24 Hrs  T(C): 36.6 (2019 07:25), Max: 36.6 (2019 07:25)  T(F): 97.9 (2019 07:25), Max: 97.9 (2019 07:25)  HR: 85 (2019 07:25) (60 - 85)  BP: 150/98 (2019 07:25) (137/60 - 159/66)  BP(mean): --  RR: 18 (2019 07:25) (18 - 19)  SpO2: 100% (2019 07:25) (98% - 100%)    PHYSICAL EXAM:  General: Pt in NAD, lying comfortably in bed, A&Ox3  Head/Neck: NC/AT, supple, no LAD  Heart: Normal S1,S2, systolic murmur  Lungs: CTA B/L, no rales or rhonchi  Abd: ND/NT, +BS, no suprapubic tenderness, no CVAT   Extremities: B/L LE 3+ edema  Skin: intact    TESTS & MEASUREMENTS:                        12.8   8.93  )-----------( 267      ( 2019 07:41 )             39.6         143  |  105  |  31<H>  ----------------------------<  158<H>  4.7   |  21  |  1.2    Ca    9.3      2019 07:41  Phos  4.9       Mg     2.0         TPro  5.7<L>  /  Alb  3.9  /  TBili  0.5  /  DBili  x   /  AST  18  /  ALT  15  /  AlkPhos  97      LIVER FUNCTIONS - ( 2019 07:41 )  Alb: 3.9 g/dL / Pro: 5.7 g/dL / ALK PHOS: 97 U/L / ALT: 15 U/L / AST: 18 U/L / GGT: x             Urinalysis Basic - ( 2019 01:01 )    Color: Yellow / Appearance: Cloudy / S.015 / pH: x  Gluc: x / Ketone: Negative  / Bili: Negative / Urobili: 0.2 mg/dL   Blood: x / Protein: 100 mg/dL / Nitrite: Negative   Leuk Esterase: Large / RBC: x / WBC 10-25 /HPF   Sq Epi: x / Non Sq Epi: Moderate /HPF / Bacteria: Few /HPF          RADIOLOGY & ADDITIONAL TESTS:    1.  No CT evidence of acute intracranial pathology.      2.  Chronic microvascular ischemic changes.    ANTIBIOTICS:  linezolid    Tablet   600 milliGRAM(s) Oral (19 @ 05:29)   600 milliGRAM(s) Oral (19 @ 18:00)

## 2019-04-17 NOTE — CONSULT NOTE ADULT - ASSESSMENT
IMPRESSION: Rehab of gait dysfunction      PRECAUTIONS: [  ] Cardiac  [  ] Respiratory  [  ] Seizures [  ] Contact Isolation  [  ] Droplet Isolation  [  ] Other    Weight Bearing Status:     RECOMMENDATION:    Out of Bed to Chair     DVT/Decubiti Prophylaxis    REHAB PLAN:     [ x  ] Bedside P/T 3-5 times a week   [   ]   Bedside O/T  2-3 times a week             [   ] No Rehab Therapy Indicated                   [   ]  Speech Therapy   Conditioning/ROM                                    ADL  Bed Mobility                                               Conditioning/ROM  Transfers                                                     Bed Mobility  Sitting /Standing Balance                         Transfers                                        Gait Training                                               Sitting/Standing Balance  Stair Training [   ]Applicable                    Home equipment Eval                                                                        Splinting  [   ] Only      GOALS:   ADL   [   ]   Independent                    Transfers  [ x  ] Independent                          Ambulation  [ x  ] Independent     [  x  ] With device                            [   ]  CG                                                         [   ]  CG                                                                  [   ] CG                            [    ] Min A                                                   [   ] Min A                                                              [   ] Min  A          DISCHARGE PLAN:   [   ]  Good candidate for Intensive Rehabilitation/Hospital based                                             Will tolerate 3hrs Intensive Rehab Daily                                       [  x  ]  Short Term Rehab in Skilled Nursing Facility / snf                           vs            [  x  ]  Home with Outpatient or VN services                                         [    ]  Possible Candidate for Intensive Hospital based Rehab

## 2019-04-17 NOTE — PROGRESS NOTE ADULT - ASSESSMENT
87/F, from home with , uses walker/Cane, has hx of HFrEF, PPM, b/l knee OA, diverticulitis, immune thrombocytopenia , dementia (A+O x1 - person) presented to ED c/o being more forgetful and agitated.    # acute delirium likely secondary to cystitis     last cultures positive for 87/F, from home with , uses walker/Cane, has hx of HFrEF, PPM, b/l knee OA, diverticulitis, immune thrombocytopenia , dementia (A+O x1 - person) presented to ED c/o being more forgetful and agitated.    # acute delirium likely secondary to cystitis     last cultures positive for E feacium     awaiting for urine culture     on linezolid bid'         # HFr EF     on lasix 40 mg BID     will decrease lasix 40 mg daily     daily weight     CKD stage 3     creatinine is increasing    # immune thrombocytopenia     platelets stable    # HTN     on losartan , metoprolol    # diverticulitis     on mesalamine    # DVT prophylaxis on enoxaparin    # GI prophylaxis on pantoprazole 40 mg daily 87/F, from home with , uses walker/Cane, has hx of HFrEF, PPM, b/l knee OA, diverticulitis, immune thrombocytopenia , dementia (A+O x1 - person) presented to ED c/o being more forgetful and agitated.    # acute delirium likely secondary to cystitis/? steroid psychosis     last cultures positive for E feacium     awaiting for urine culture     on linezolid bid'         # HFr EF     on lasix 40 mg BID     will decrease lasix 40 mg daily     daily weight     CKD stage 3     creatinine is increasing    # immune thrombocytopenia     platelets stable    # HTN     on losartan , metoprolol    # diverticulitis/ colitis     on mesalamine and chronic steroids 5 mg daily     started on calcium and vitamin d    # DVT prophylaxis on enoxaparin    # GI prophylaxis on pantoprazole 40 mg daily

## 2019-04-17 NOTE — PROGRESS NOTE ADULT - SUBJECTIVE AND OBJECTIVE BOX
SHELLEY PETIT  87y Female    CHIEF COMPLAINT:    Patient is a 87y old  Female who presents with a chief complaint of agitation, AMS at night, uncomplicated cystitis (17 Apr 2019 10:14)      INTERVAL HPI/OVERNIGHT EVENTS:    Patient seen and examined.    ROS: All other systems are negative.    Vital Signs:    T(F): 97.9 (04-17-19 @ 07:25), Max: 97.9 (04-17-19 @ 07:25)  HR: 85 (04-17-19 @ 07:25) (60 - 85)  BP: 150/98 (04-17-19 @ 07:25) (137/60 - 159/66)  RR: 18 (04-17-19 @ 07:25) (18 - 19)  SpO2: 96% (04-17-19 @ 11:35) (96% - 100%)  I&O's Summary    Daily     Daily   CAPILLARY BLOOD GLUCOSE          PHYSICAL EXAM:    GENERAL:  NAD  SKIN: No rashes or lesions  HENT: Atrumatic. Normocephalic. PERRL. Moist membranes.  NECK: Supple, No JVD. No lymphadenopathy.  PULMONARY: CTA B/L. No wheezing. No rales  CVS: Normal S1, S2. Rate and Rythm are regular. No murmurs.  ABDOMEN/GI: Soft, Nontender, Nondistended; BS present  EXTREMITIES: Peripheral pulses intact. No edema B/L LE.  NEUROLOGIC:  No motor or sensory deficit.  PSYCH: Alert & oriented x 3    Consultant(s) Notes Reviewed:  [x ] YES  [ ] NO  Care Discussed with Consultants/Other Providers [ x] YES  [ ] NO    EKG reviewed  Telemetry reviewed    LABS:                        12.8   8.93  )-----------( 267      ( 17 Apr 2019 07:41 )             39.6     04-17    143  |  105  |  31<H>  ----------------------------<  158<H>  4.7   |  21  |  1.2    Ca    9.3      17 Apr 2019 07:41  Phos  4.9     04-17  Mg     2.0     04-17    TPro  5.7<L>  /  Alb  3.9  /  TBili  0.5  /  DBili  x   /  AST  18  /  ALT  15  /  AlkPhos  97  04-17      Serum Pro-Brain Natriuretic Peptide: 4129 pg/mL (04-16-19 @ 01:45)    Trop <0.01, CKMB --, CK --, 04-16-19 @ 01:45        RADIOLOGY & ADDITIONAL TESTS:      Imaging or report Personally Reviewed:  [ ] YES  [ ] NO    Medications:  Standing  calcium carbonate 1250 mG  + Vitamin D (OsCal 500 + D) 1 Tablet(s) Oral daily  chlorhexidine 4% Liquid 1 Application(s) Topical <User Schedule>  cholestyramine Powder (Sugar-Free) 4 Gram(s) Oral daily  enoxaparin Injectable 40 milliGRAM(s) SubCutaneous every 24 hours  furosemide    Tablet 40 milliGRAM(s) Oral daily  linezolid    Tablet 600 milliGRAM(s) Oral every 12 hours  losartan 12.5 milliGRAM(s) Oral daily  mesalamine DR Capsule 1200 milliGRAM(s) Oral two times a day  metoprolol succinate ER 25 milliGRAM(s) Oral daily  predniSONE   Tablet 5 milliGRAM(s) Oral daily  QUEtiapine 25 milliGRAM(s) Oral two times a day    PRN Meds  acetaminophen    Suspension .. 650 milliGRAM(s) Oral every 6 hours PRN  lidocaine   Patch 2 Patch Transdermal every 24 hours PRN      Case discussed with resident    Care discussed with pt/family SHELLEY PETIT  87y Female    CHIEF COMPLAINT:    Patient is a 87y old  Female who presents with a chief complaint of agitation, AMS at night, uncomplicated cystitis (17 Apr 2019 10:14)      INTERVAL HPI/OVERNIGHT EVENTS:    Patient seen and examined. Looks comfortable now. Becomes more confused at night. No fever.    ROS: All other systems are negative.    Vital Signs:    T(F): 97.9 (04-17-19 @ 07:25), Max: 97.9 (04-17-19 @ 07:25)  HR: 85 (04-17-19 @ 07:25) (60 - 85)  BP: 150/98 (04-17-19 @ 07:25) (137/60 - 159/66)  RR: 18 (04-17-19 @ 07:25) (18 - 19)  SpO2: 96% (04-17-19 @ 11:35) (96% - 100%)  I&O's Summary    Daily     Daily   CAPILLARY BLOOD GLUCOSE          PHYSICAL EXAM:    GENERAL:  NAD  SKIN: No rashes or lesions  HENT: Atraumatic Normocephalic. PERRL. Moist membranes.  NECK: Supple, No JVD. No lymphadenopathy.  PULMONARY: CTA B/L. No wheezing. No rales  CVS: Normal S1, S2. Rate and Rhythm are regular. No murmurs.  ABDOMEN/GI: Soft, Nontender, Nondistended; BS present  EXTREMITIES: Peripheral pulses intact. No edema B/L LE.  NEUROLOGIC:  No motor or sensory deficit.  PSYCH: Alert & oriented x 0    Consultant(s) Notes Reviewed:  [x ] YES  [ ] NO  Care Discussed with Consultants/Other Providers [ x] YES  [ ] NO    EKG reviewed  Telemetry reviewed    LABS:                        12.8   8.93  )-----------( 267      ( 17 Apr 2019 07:41 )             39.6     04-17    143  |  105  |  31<H>  ----------------------------<  158<H>  4.7   |  21  |  1.2    Ca    9.3      17 Apr 2019 07:41  Phos  4.9     04-17  Mg     2.0     04-17    TPro  5.7<L>  /  Alb  3.9  /  TBili  0.5  /  DBili  x   /  AST  18  /  ALT  15  /  AlkPhos  97  04-17      Serum Pro-Brain Natriuretic Peptide: 4129 pg/mL (04-16-19 @ 01:45)    Trop <0.01, CKMB --, CK --, 04-16-19 @ 01:45        RADIOLOGY & ADDITIONAL TESTS:      Imaging or report Personally Reviewed:  [ ] YES  [ ] NO    Medications:  Standing  calcium carbonate 1250 mG  + Vitamin D (OsCal 500 + D) 1 Tablet(s) Oral daily  chlorhexidine 4% Liquid 1 Application(s) Topical <User Schedule>  cholestyramine Powder (Sugar-Free) 4 Gram(s) Oral daily  enoxaparin Injectable 40 milliGRAM(s) SubCutaneous every 24 hours  furosemide    Tablet 40 milliGRAM(s) Oral daily  linezolid    Tablet 600 milliGRAM(s) Oral every 12 hours  losartan 12.5 milliGRAM(s) Oral daily  mesalamine DR Capsule 1200 milliGRAM(s) Oral two times a day  metoprolol succinate ER 25 milliGRAM(s) Oral daily  predniSONE   Tablet 5 milliGRAM(s) Oral daily  QUEtiapine 25 milliGRAM(s) Oral two times a day    PRN Meds  acetaminophen    Suspension .. 650 milliGRAM(s) Oral every 6 hours PRN  lidocaine   Patch 2 Patch Transdermal every 24 hours PRN      Case discussed with resident    Care discussed with pt/family

## 2019-04-17 NOTE — BEHAVIORAL HEALTH ASSESSMENT NOTE - SUMMARY
87 female with history of undiagnosed dementia, no other prior psychiatric history, admitted to medicine after presenting in the ED with agitation. She was has been diagnosed with a UTI.    Patient's mental status and disorientation is secondary to dementia. Her current mental status is her baseline based on information obtained from her family. Her episodes of agitation currently are in the context of needing to frequently urinate and prior to admission were in the context of leg pain from swelling/osteoarthritis.     1) Dementia with agitation: Treat underlying medical issues including pain. Redirect patient frequently. Stop standing Seroquel due to patient's age and history of CHF, which increases her risk for arrythmia. If patient is agitated, first redirect, if not amenable to redirection and agitation poses a risk to her or other can given Seroqeul 12.5-25mg PO prn. Check EKG after giving dose. Consider cholinersterase inhibitor. Check B12, Folate, TSH.

## 2019-04-17 NOTE — BEHAVIORAL HEALTH ASSESSMENT NOTE - RISK ASSESSMENT
Patient demonstrates cognitive impairment and is currently upset in the context of needing to urinate which makes assessment of suicidal thoughts difficult, however no safety concerns elicited from family or primary team.

## 2019-04-17 NOTE — CONSULT NOTE ADULT - SUBJECTIVE AND OBJECTIVE BOX
HPI:  87/F, from home with , uses walker/Cane, has hx of HFrEF, PPM, b/l knee OA, diverticulitis, immune thrombocytopenia (Onc - Dr. Haskins), dementia (A+O x1 - person) presented to ED c/o being more forgetful and agitated.    Family reports at night time only pt becomes very agitated, screams in pain - she is unable to be calmed. This has occurred in the past, and now has been ongoing for 2.5 wks. Last night was worse - pt was unable to be consoled so  called daughter & brought pt to ED. She has underlying dementia with worsening memory the last 5 yrs - now at baseline she is A&Ox1 (person). During the day she is calm, and relaxed, though at night she becomes aggitated and aggressiver - screaming in pain in her legs; though no pain during the day.    2 days PTP family noticed increased leg swelling bilaterally, though she has chronic leg swelling, but worse over the past week. They called her cardiologist - and had lasix dose increased to 2 tabs per day and was planning to see next week in his office. No report of SOB or orthopnea.    No reports of bleeding, fevers, rash, chest pain, diarrhea, or recent falls. Pt has freq urination - family attributes to lasix, no pain with urination.    hx from  + patient + children (2019 09:37)      PAST MEDICAL & SURGICAL HISTORY:  Intestinal perforation  Diverticulitis  Ejection fraction < 50%  Pacemaker  CHF (congestive heart failure): HFrEF  Dementia  Cardiomyopathy  Immune thrombocytopenia  History of intestine removal  History of cholecystectomy  Artificial cardiac pacemaker      Hospital Course:    TODAY'S SUBJECTIVE & REVIEW OF SYMPTOMS:  Cognitive confused      MEDICATIONS  (STANDING):  calcium carbonate 1250 mG  + Vitamin D (OsCal 500 + D) 1 Tablet(s) Oral daily  chlorhexidine 4% Liquid 1 Application(s) Topical <User Schedule>  cholestyramine Powder (Sugar-Free) 4 Gram(s) Oral daily  enoxaparin Injectable 40 milliGRAM(s) SubCutaneous every 24 hours  furosemide    Tablet 40 milliGRAM(s) Oral daily  linezolid    Tablet 600 milliGRAM(s) Oral every 12 hours  losartan 12.5 milliGRAM(s) Oral daily  mesalamine DR Capsule 1200 milliGRAM(s) Oral two times a day  metoprolol succinate ER 25 milliGRAM(s) Oral daily  predniSONE   Tablet 5 milliGRAM(s) Oral daily  QUEtiapine 25 milliGRAM(s) Oral two times a day    MEDICATIONS  (PRN):  acetaminophen    Suspension .. 650 milliGRAM(s) Oral every 6 hours PRN Mild Pain (1 - 3)  lidocaine   Patch 2 Patch Transdermal every 24 hours PRN Knee pain bilateral      FAMILY HISTORY:  No pertinent family history in first degree relatives      Allergies    No Known Allergies    Intolerances        SOCIAL HISTORY:    [  ] Etoh  [  ] Smoking  [  ] Substance abuse     Home Environment:  [  ] Home Alone  [x  ] Lives with Family  [  ] Home Health Aid    Dwelling:  [  ] Apartment  [x  ] Private House  [  ] Adult Home  [  ] Skilled Nursing Facility      [  ] Short Term  [  ] Long Term  [  ] Stairs       Elevator [  ]    FUNCTIONAL STATUS PTA: (Check all that apply)  Ambulation: [   ]Independent    [  ] Dependent     [  ] Non-Ambulatory  Assistive Device: [  ] SA Cane  [  ]  Q Cane  [  ] Walker  [  ]  Wheelchair  ADL : [  ] Independent  [x  ]  Dependent       Vital Signs Last 24 Hrs  T(C): 36.3 (2019 12:23), Max: 36.6 (2019 07:25)  T(F): 97.3 (2019 12:23), Max: 97.9 (2019 07:25)  HR: 80 (2019 12:23) (60 - 85)  BP: 129/61 (2019 12:23) (129/61 - 159/66)  BP(mean): --  RR: 18 (2019 12:23) (18 - 19)  SpO2: 96% (2019 11:35) (96% - 100%)      PHYSICAL EXAM: confused  GENERAL: NAD  HEAD:  Atraumatic, Normocephalic  CHEST/LUNG: Clear   HEART: S1S2+  ABDOMEN: Soft, Nontender  EXTREMITIES:  no calf tenderness    NERVOUS SYSTEM:  Cranial Nerves 2-12 intact [  ] Abnormal  [  ]  ROM: WFL all extremities [  ]  Abnormal [  ]able to move all ext  Motor Strength: WFL all extremities  [  ]  Abnormal [  ]  Sensation: intact to light touch [  ] Abnormal [  ]  Reflexes: Symmetric [  ]  Abnormal [  ]    FUNCTIONAL STATUS:  Bed Mobility: Independent [  ]  Supervision [  ]  Needs Assistance [x  ]  N/A [  ]  Transfers: Independent [  ]  Supervision [  ]  Needs Assistance [x  ]  N/A [  ]   Ambulation: Independent [  ]  Supervision [  ]  Needs Assistance [  ]  N/A [  ]  ADL: Independent [  ] Requires Assistance [  ] N/A [  ]      LABS:                        12.8   8.93  )-----------( 267      ( 2019 07:41 )             39.6     -17    143  |  105  |  31<H>  ----------------------------<  158<H>  4.7   |  21  |  1.2    Ca    9.3      2019 07:41  Phos  4.9     -  Mg     2.0         TPro  5.7<L>  /  Alb  3.9  /  TBili  0.5  /  DBili  x   /  AST  18  /  ALT  15  /  AlkPhos  97        Urinalysis Basic - ( 2019 01:01 )    Color: Yellow / Appearance: Cloudy / S.015 / pH: x  Gluc: x / Ketone: Negative  / Bili: Negative / Urobili: 0.2 mg/dL   Blood: x / Protein: 100 mg/dL / Nitrite: Negative   Leuk Esterase: Large / RBC: x / WBC 10-25 /HPF   Sq Epi: x / Non Sq Epi: Moderate /HPF / Bacteria: Few /HPF        RADIOLOGY & ADDITIONAL STUDIES:    Assesment:

## 2019-04-17 NOTE — BEHAVIORAL HEALTH ASSESSMENT NOTE - NSBHCHARTREVIEWVS_PSY_A_CORE FT
Vital Signs Last 24 Hrs  T(C): 36.3 (17 Apr 2019 12:23), Max: 36.6 (17 Apr 2019 07:25)  T(F): 97.3 (17 Apr 2019 12:23), Max: 97.9 (17 Apr 2019 07:25)  HR: 80 (17 Apr 2019 12:23) (60 - 85)  BP: 129/61 (17 Apr 2019 12:23) (129/61 - 159/66)  BP(mean): --  RR: 18 (17 Apr 2019 12:23) (18 - 18)  SpO2: 96% (17 Apr 2019 11:35) (96% - 100%)  04-17

## 2019-04-17 NOTE — PROGRESS NOTE ADULT - ASSESSMENT
An 87 years old female was brought in for worsening forgetfulness and agitation.    WBC: 9.10  BUN/Cr. : 33/1.0  CXR: NAPD  U/A: Leukocyte esterase: Large. Blood large  CT head: Ch. microvascular changes  EKG: NSR @ 73/min. NS ST, T changes. (Interpreted by me)    ASSESSMENT:    1. Encephalopathy  2. UTI  3. Ch. HFrEF  4. H/O Immune Thrombocytopenia  5. Senile Dementia    PLAN:    . H/O VRE UTI during last admission on 9/2018. Will empirically start her on Linezolid and follow the cxs  . ID eval. Check urine cxs  . Venous doppler LE negative for DVT  . Cont her home meds. An 87 years old female was brought in for worsening forgetfulness and agitation.    WBC: 9.10  BUN/Cr. : 33/1.0  CXR: NAPD  U/A: Leukocyte esterase: Large. Blood large  CT head: Ch. microvascular changes  EKG: NSR @ 73/min. NS ST, T changes. (Interpreted by me)      1. Encephalopathy  2. UTI  3. Ch. HFrEF  4. H/O Immune Thrombocytopenia  5. Senile Dementia         PLAN:    . H/O VRE UTI during last admission on 9/2018. As per ID it was colonization. pt was not treated with Abx  . Care D/W the ID. Check cxs. Likely no need of Abx as pt has no fever and WBC count is normal  . Pt symptoms could be likely due to worsening of dementia  . Psych eval  . Cont Seroquel  . Venous doppler LE negative for DVT  . Cont her home meds.  . Rehab eval  . Plan of care D/W the family on bedside.

## 2019-04-18 ENCOUNTER — TRANSCRIPTION ENCOUNTER (OUTPATIENT)
Age: 84
End: 2019-04-18

## 2019-04-18 LAB
ANION GAP SERPL CALC-SCNC: 14 MMOL/L — SIGNIFICANT CHANGE UP (ref 7–14)
BUN SERPL-MCNC: 39 MG/DL — HIGH (ref 10–20)
CALCIUM SERPL-MCNC: 8.3 MG/DL — LOW (ref 8.5–10.1)
CHLORIDE SERPL-SCNC: 106 MMOL/L — SIGNIFICANT CHANGE UP (ref 98–110)
CO2 SERPL-SCNC: 23 MMOL/L — SIGNIFICANT CHANGE UP (ref 17–32)
CREAT SERPL-MCNC: 1.2 MG/DL — SIGNIFICANT CHANGE UP (ref 0.7–1.5)
GLUCOSE SERPL-MCNC: 101 MG/DL — HIGH (ref 70–99)
HCT VFR BLD CALC: 34.2 % — LOW (ref 37–47)
HGB BLD-MCNC: 10.8 G/DL — LOW (ref 12–16)
MAGNESIUM SERPL-MCNC: 2.1 MG/DL — SIGNIFICANT CHANGE UP (ref 1.8–2.4)
MCHC RBC-ENTMCNC: 28.1 PG — SIGNIFICANT CHANGE UP (ref 27–31)
MCHC RBC-ENTMCNC: 31.6 G/DL — LOW (ref 32–37)
MCV RBC AUTO: 88.8 FL — SIGNIFICANT CHANGE UP (ref 81–99)
NRBC # BLD: 0 /100 WBCS — SIGNIFICANT CHANGE UP (ref 0–0)
PLATELET # BLD AUTO: 213 K/UL — SIGNIFICANT CHANGE UP (ref 130–400)
POTASSIUM SERPL-MCNC: 4 MMOL/L — SIGNIFICANT CHANGE UP (ref 3.5–5)
POTASSIUM SERPL-SCNC: 4 MMOL/L — SIGNIFICANT CHANGE UP (ref 3.5–5)
RBC # BLD: 3.85 M/UL — LOW (ref 4.2–5.4)
RBC # FLD: 13.4 % — SIGNIFICANT CHANGE UP (ref 11.5–14.5)
SODIUM SERPL-SCNC: 143 MMOL/L — SIGNIFICANT CHANGE UP (ref 135–146)
TSH SERPL-MCNC: 1.55 UIU/ML — SIGNIFICANT CHANGE UP (ref 0.27–4.2)
WBC # BLD: 6.36 K/UL — SIGNIFICANT CHANGE UP (ref 4.8–10.8)
WBC # FLD AUTO: 6.36 K/UL — SIGNIFICANT CHANGE UP (ref 4.8–10.8)

## 2019-04-18 RX ORDER — FUROSEMIDE 40 MG
20 TABLET ORAL
Qty: 0 | Refills: 0 | Status: DISCONTINUED | OUTPATIENT
Start: 2019-04-20 | End: 2019-04-22

## 2019-04-18 RX ORDER — FUROSEMIDE 40 MG
40 TABLET ORAL
Qty: 0 | Refills: 0 | Status: DISCONTINUED | OUTPATIENT
Start: 2019-04-18 | End: 2019-04-22

## 2019-04-18 RX ORDER — FUROSEMIDE 40 MG
1 TABLET ORAL
Qty: 0 | Refills: 0 | DISCHARGE
Start: 2019-04-18

## 2019-04-18 RX ORDER — DIPHENOXYLATE HCL/ATROPINE 2.5-.025MG
1 TABLET ORAL
Qty: 0 | Refills: 0 | COMMUNITY

## 2019-04-18 RX ADMIN — LINEZOLID 600 MILLIGRAM(S): 600 INJECTION, SOLUTION INTRAVENOUS at 05:12

## 2019-04-18 RX ADMIN — ENOXAPARIN SODIUM 40 MILLIGRAM(S): 100 INJECTION SUBCUTANEOUS at 13:47

## 2019-04-18 RX ADMIN — LOSARTAN POTASSIUM 12.5 MILLIGRAM(S): 100 TABLET, FILM COATED ORAL at 05:11

## 2019-04-18 RX ADMIN — Medication 1200 MILLIGRAM(S): at 17:04

## 2019-04-18 RX ADMIN — Medication 25 MILLIGRAM(S): at 05:12

## 2019-04-18 RX ADMIN — CHOLESTYRAMINE 4 GRAM(S): 4 POWDER, FOR SUSPENSION ORAL at 08:06

## 2019-04-18 RX ADMIN — CHLORHEXIDINE GLUCONATE 1 APPLICATION(S): 213 SOLUTION TOPICAL at 05:11

## 2019-04-18 RX ADMIN — Medication 40 MILLIGRAM(S): at 05:11

## 2019-04-18 RX ADMIN — Medication 1200 MILLIGRAM(S): at 05:11

## 2019-04-18 RX ADMIN — Medication 5 MILLIGRAM(S): at 05:12

## 2019-04-18 RX ADMIN — Medication 1 TABLET(S): at 11:18

## 2019-04-18 NOTE — PHYSICAL THERAPY INITIAL EVALUATION ADULT - CRITERIA FOR SKILLED THERAPEUTIC INTERVENTIONS
risk reduction/prevention/impairments found/functional limitations in following categories/therapy frequency/rehab potential/predicted duration of therapy intervention

## 2019-04-18 NOTE — DISCHARGE NOTE NURSING/CASE MANAGEMENT/SOCIAL WORK - NSDCPEPT PROEDHF_GEN_ALL_CORE
Activities as tolerated/Call primary care provider for follow up after discharge/Low salt diet/Monitor weight daily/Report signs and symptoms to primary care provider

## 2019-04-18 NOTE — DISCHARGE NOTE NURSING/CASE MANAGEMENT/SOCIAL WORK - NSDCDPATPORTLINK_GEN_ALL_CORE
You can access the [facility] Portal, offered by [facility], by registering with the following website: [email or web address]

## 2019-04-18 NOTE — DISCHARGE NOTE PROVIDER - CARE PROVIDER_API CALL
Bernabe Robertson)  Internal Medicine  [address] [place] [address]  Phone: [phone]  Fax: [phone]  Follow Up Time: 1 week Bernabe Robertson)  Internal Medicine  [address] [place] [address]  Phone: [phone]  Fax: [phone]  Follow Up Time: 1 week    Marco A Carias)  Urology  [address] [facility], [place] [address]  Phone: [phone]  Fax: [phone]  Follow Up Time: 1 week    Roc Brown)  Cardiology; Internal Medicine  [address] [place] [address], [place] [address]  Phone: [phone]  Fax: [phone]  Follow Up Time: 1 week

## 2019-04-18 NOTE — PHYSICAL THERAPY INITIAL EVALUATION ADULT - IMPAIRMENTS FOUND, PT EVAL
aerobic capacity/endurance/cognitive impairment/gait, locomotion, and balance/integumentary integrity/ROM/posture/gross motor/poor safety awareness/ergonomics and body mechanics/joint integrity and mobility/muscle strength

## 2019-04-18 NOTE — PROGRESS NOTE ADULT - SUBJECTIVE AND OBJECTIVE BOX
SHELLEY PETIT  87y, Female      OVERNIGHT EVENTS:  no acute events overnight  ucx NG    ROS negative except as per above    VITALS:  T(F): 97.1, Max: 97.3 (04-17-19 @ 12:23)  HR: 60  BP: 162/73  RR: 17Vital Signs Last 24 Hrs  T(C): 36.2 (18 Apr 2019 05:38), Max: 36.3 (17 Apr 2019 12:23)  T(F): 97.1 (18 Apr 2019 05:38), Max: 97.3 (17 Apr 2019 12:23)  HR: 60 (18 Apr 2019 05:38) (60 - 80)  BP: 162/73 (18 Apr 2019 05:38) (129/61 - 162/73)  BP(mean): --  RR: 17 (18 Apr 2019 05:38) (17 - 18)  SpO2: 98% (18 Apr 2019 00:06) (96% - 98%)    PHYSICAL EXAM:  General: Pt in NAD, lying comfortably in bed, A&Ox3  Head/Neck: NC/AT, supple, no LAD  Heart: Normal S1,S2, systolic murmur  Lungs: CTA B/L, no rales or rhonchi  Abd: ND/NT, +BS, no suprapubic tenderness, no CVAT   Extremities: B/L LE 3+ edema  Skin: intact    TESTS & MEASUREMENTS:                        10.8   6.36  )-----------( 213      ( 18 Apr 2019 05:30 )             34.2     04-18    143  |  106  |  39<H>  ----------------------------<  101<H>  4.0   |  23  |  1.2    Ca    8.3<L>      18 Apr 2019 05:30  Phos  4.9     04-17  Mg     2.1     04-18    TPro  5.7<L>  /  Alb  3.9  /  TBili  0.5  /  DBili  x   /  AST  18  /  ALT  15  /  AlkPhos  97  04-17    LIVER FUNCTIONS - ( 17 Apr 2019 07:41 )  Alb: 3.9 g/dL / Pro: 5.7 g/dL / ALK PHOS: 97 U/L / ALT: 15 U/L / AST: 18 U/L / GGT: x             Culture - Urine (collected 04-16-19 @ 09:55)  Source: .Urine Clean Catch (Midstream)  Final Report (04-17-19 @ 19:20):    <10,000 CFU/mL Normal Urogenital Hali          RADIOLOGY & ADDITIONAL TESTS:    ANTIBIOTICS:  linezolid    Tablet   600 milliGRAM(s) Oral (04-18-19 @ 05:12)   600 milliGRAM(s) Oral (04-17-19 @ 17:33)   600 milliGRAM(s) Oral (04-17-19 @ 05:29)   600 milliGRAM(s) Oral (04-16-19 @ 18:00)        linezolid    Tablet 600 milliGRAM(s) Oral every 12 hours

## 2019-04-18 NOTE — PHYSICAL THERAPY INITIAL EVALUATION ADULT - GENERAL OBSERVATIONS, REHAB EVAL
Pt seen [phone] for a total of 45 minutes. Pt encountered semifowlers in bed, no apparent distress, agreeable to physical therapy with encouragement, + primafit.

## 2019-04-18 NOTE — DISCHARGE NOTE PROVIDER - INSTRUCTIONS
Low salt and cholesterol, Increase consumption of fruits vegetables and fibers and avoid excessive alcohol intake

## 2019-04-18 NOTE — PHYSICAL THERAPY INITIAL EVALUATION ADULT - RANGE OF MOTION EXAMINATION, REHAB EVAL
grossly assessed/bilateral upper extremity ROM was WFL (within functional limits)/bilateral lower extremity ROM was WFL (within functional limits)

## 2019-04-18 NOTE — PHYSICAL THERAPY INITIAL EVALUATION ADULT - ADDITIONAL COMMENTS
Pt reports she has 4 steps to enter and must go home to wash her floors. Pt's  and dtg present at end of session and report that pt has 1 steps to enter then a chairlift for the patient.

## 2019-04-18 NOTE — PROGRESS NOTE ADULT - SUBJECTIVE AND OBJECTIVE BOX
SHELLEY PETIT  87y  [facility] T1-3A [address] A      Patient is a 87y old  Female who presents with a chief complaint of agitation, AMS at night, uncomplicated cystitis (18 Apr 2019 07:46)      INTERVAL HPI/OVERNIGHT EVENTS:    no acute events overnight     REVIEW OF SYSTEMS:  CONSTITUTIONAL: No fever, weight loss, or fatigue  EYES: No eye pain, visual disturbances, or discharge  ENMT:  No difficulty hearing, tinnitus, vertigo; No sinus or throat pain  NECK: No pain or stiffness  BREASTS: No pain, masses, or nipple discharge  RESPIRATORY: No cough, wheezing, chills or hemoptysis; No shortness of breath  CARDIOVASCULAR: No chest pain, palpitations, dizziness, or leg swelling  GASTROINTESTINAL: No abdominal or epigastric pain. No nausea, vomiting, or hematemesis; No diarrhea or constipation. No melena or hematochezia.  GENITOURINARY: No dysuria, frequency, hematuria, or incontinence  NEUROLOGICAL: +Memory diffculty  SKIN: No itching, burning, rashes, or lesions   LYMPH NODES: No enlarged glands  ENDOCRINE: No heat or cold intolerance; No hair loss  MUSCULOSKELETAL: No joint pain or swelling; No muscle, back, or extremity pain  PSYCHIATRIC: + Agitation  HEME/LYMPH: No easy bruising, or bleeding gums  ALLERY AND IMMUNOLOGIC: No hives or eczema  FAMILY HISTORY:  No pertinent family history in first degree relatives    T(C): 36.2 (04-18-19 @ 05:38), Max: 36.3 (04-17-19 @ 12:23)  HR: 60 (04-18-19 @ 05:38) (60 - 80)  BP: 162/73 (04-18-19 @ 05:38) (129/61 - 162/73)  RR: 17 (04-18-19 @ 05:38) (17 - 18)  SpO2: 98% (04-18-19 @ 00:06) (96% - 98%)  Wt(kg): --Vital Signs Last 24 Hrs  T(C): 36.2 (18 Apr 2019 05:38), Max: 36.3 (17 Apr 2019 12:23)  T(F): 97.1 (18 Apr 2019 05:38), Max: 97.3 (17 Apr 2019 12:23)  HR: 60 (18 Apr 2019 05:38) (60 - 80)  BP: 162/73 (18 Apr 2019 05:38) (129/61 - 162/73)  BP(mean): --  RR: 17 (18 Apr 2019 05:38) (17 - 18)  SpO2: 98% (18 Apr 2019 00:06) (96% - 98%)    PHYSICAL EXAM:GEN Lying in no acute distress  HEENT Pupils equal and reactive to light and accommodationSupple Neck  PULM Clear to auscultation bilaterally  CV s1s2 regular rate and rhythm  GI + bowel sounds nontnender  EXT  trace lower extremity edema ,   PSYCH awake alert and oriented x 1 (only oriented to place)  INTEG No Lesions  NEURO GALVEZ    LABS:                            10.8   6.36  )-----------( 213      ( 18 Apr 2019 05:30 )             34.2   04-18    143  |  106  |  39<H>  ----------------------------<  101<H>  4.0   |  23  |  1.2    Ca    8.3<L>      18 Apr 2019 05:30  Phos  4.9     04-17  Mg     2.1     04-18    TPro  5.7<L>  /  Alb  3.9  /  TBili  0.5  /  DBili  x   /  AST  18  /  ALT  15  /  AlkPhos  97  04-17            Culture - Urine (collected 16 Apr 2019 09:55)  Source: .Urine Clean Catch (Midstream)  Final Report (17 Apr 2019 19:20):    <10,000 CFU/mL Normal Urogenital Hali      acetaminophen    Suspension .. 650 milliGRAM(s) Oral every 6 hours PRN  calcium carbonate 1250 mG  + Vitamin D (OsCal 500 + D) 1 Tablet(s) Oral daily  chlorhexidine 4% Liquid 1 Application(s) Topical <User Schedule>  cholestyramine Powder (Sugar-Free) 4 Gram(s) Oral daily  enoxaparin Injectable 40 milliGRAM(s) SubCutaneous every 24 hours  furosemide    Tablet 40 milliGRAM(s) Oral daily  lidocaine   Patch 2 Patch Transdermal every 24 hours PRN  losartan 12.5 milliGRAM(s) Oral daily  mesalamine DR Capsule 1200 milliGRAM(s) Oral two times a day  metoprolol succinate ER 25 milliGRAM(s) Oral daily  predniSONE   Tablet 5 milliGRAM(s) Oral daily      HEALTH ISSUES - PROBLEM Dx:  Dementia due to Parkinson's disease with behavioral disturbance          Case Discussed with House Staff   45 minutes spent on total encounter; more than 50% of the visit was spent counseling and/or coordinating care by the attending physician.   Spectra [phone]

## 2019-04-18 NOTE — DISCHARGE NOTE PROVIDER - NSDCCPCAREPLAN_GEN_ALL_CORE_FT
PRINCIPAL DISCHARGE DIAGNOSIS  Diagnosis: Dementia due to Parkinson's disease with behavioral disturbance  Assessment and Plan of Treatment: Please take medications as prescribed. Please follow up with your primary medical doctor within one week of discharge. Grant has evaluated patient. When patient is agitated it is best to try redirection and relaxation techniques. If this fails medal therapy can be attempted with Seroquel 12.mg orally as needed. Would suggest EKG after eac dosage. If symptoms worsen or reoccur, please call 911 or report to the nearest Emergency Medicine Department.      SECONDARY DISCHARGE DIAGNOSES  Diagnosis: Vitamin D deficiency  Assessment and Plan of Treatment: Please take medications as prescribed. Please follow up with primary medical doctor within one week of discharge. Continue oral supplementation.    Diagnosis: Aortic stenosis  Assessment and Plan of Treatment: Please take medications as prescribed. Please follow up with primary care doctor and cardiologist within one week of discharge.    Diagnosis: ROMIE (acute kidney injury)  Assessment and Plan of Treatment: Please follow up with primary medical doctor within one week of discharge. Please have a repeat Basic Metabolic Panel completed within one week of discharge to monitor BUN and Creatinine. PRINCIPAL DISCHARGE DIAGNOSIS  Diagnosis: Dementia due to Parkinson's disease with behavioral disturbance  Assessment and Plan of Treatment: Please take medications as prescribed. Please follow up with your primary medical doctor within one week of discharge. Pyshciatry has evaluated patient. When patient is agitated it is best to try redirection and relaxation techniques.  If symptoms worsen or reoccur, please call 911 or report to the nearest Emergency Medicine Department.      SECONDARY DISCHARGE DIAGNOSES  Diagnosis: Vitamin D deficiency  Assessment and Plan of Treatment: Please take medications as prescribed. Please follow up with primary medical doctor within one week of discharge. Continue oral supplementation.    Diagnosis: Aortic stenosis  Assessment and Plan of Treatment: Please take medications as prescribed. Please follow up with primary care doctor and cardiologist within one week of discharge.    Diagnosis: ROMIE (acute kidney injury)  Assessment and Plan of Treatment: Please follow up with primary medical doctor within one week of discharge. Please have a repeat Basic Metabolic Panel completed within one week of discharge to monitor BUN and Creatinine. PRINCIPAL DISCHARGE DIAGNOSIS  Diagnosis: Dementia due to Parkinson's disease with behavioral disturbance  Assessment and Plan of Treatment: Please take medications as prescribed. Please follow up with your primary medical doctor within one week of discharge.You were evaluated by psychiatry. When agitation is noted it is best to try redirection and relaxation techniques.  If symptoms worsen or reoccur, please call 911 or report to the nearest Emergency Medicine Department.      SECONDARY DISCHARGE DIAGNOSES  Diagnosis: Lower extremity edema  Assessment and Plan of Treatment: Continue taking the Lasix as prescribed : 40 mg every other day alternating with 20 mg the rest of the days   Follow up with the primary care provider as outpatient   A venous duplex of the lower extremities was done in the hospital twice and didn't show any clots in the legs    Diagnosis: Urinary retention  Assessment and Plan of Treatment: You have urinary retention and you will be discharged home with a Tapia catheter. Home care is set up for you for proper handling of the Tapia catheter. Follow up with a urologist within 1 week of discharge for urodynamic studies   Call your primary care doctor if you notice changes in the urine (color changes, changes in quantity, belly pain, fever / chills) as this may indicate an infection    Diagnosis: Vitamin D deficiency  Assessment and Plan of Treatment: Please take medications as prescribed. Please follow up with primary medical doctor within one week of discharge. Continue oral supplementation.    Diagnosis: Aortic stenosis  Assessment and Plan of Treatment: Please take medications as prescribed. Please follow up with primary care doctor and cardiologist within one week of discharge.  Call 911 or report to the nearest emergency room if you have chest pain, worsening shortness of breath or if you're unable to sleep on your back because of severe shortness of breath    Diagnosis: ROMIE (acute kidney injury)  Assessment and Plan of Treatment: Please follow up with primary medical doctor within one week of discharge. Please have a repeat Basic Metabolic Panel completed within one week of discharge to monitor BUN and Creatinine.  Oral water intake is encouraged to prevent dehydration and worsening kidney function

## 2019-04-18 NOTE — PHYSICAL THERAPY INITIAL EVALUATION ADULT - TRANSFER SAFETY CONCERNS NOTED: SIT/STAND, REHAB EVAL
decreased balance during turns/decreased proprioception/decreased safety awareness/decreased weight-shifting ability

## 2019-04-18 NOTE — PHYSICAL THERAPY INITIAL EVALUATION ADULT - PLANNED THERAPY INTERVENTIONS, PT EVAL
gait training/stair negotiation/ROM/transfer training/strengthening/bed mobility training/stretching/balance training

## 2019-04-18 NOTE — DISCHARGE NOTE PROVIDER - HOSPITAL COURSE
88 yo F    PMH: HFrEF s/p PPM, b/l knee OA, Diverticulitis, Immune Thrombocytopenia, Dementia (A+O x1 - person)     CC: Presented to ED  with a complaint of being more forgetful and agitated.    Disposition: From home with , uses walker/Cane    Patient was treated for the following conditions:    1. Encephalopathy vs progressive dementia: Likely progressive dementia with additional behaviors disturbances. Psychiatry recommendations noted and appreciated. Seroquel as a standing medication has been discontinued. As first line therapy would recommend reorientation. If this is no successful can proceed with Seroquel 12.5 mg PO prn with an EKG check each dosage.     2. Mild to Moderate tricuspid regurgitation with Severe Aortic Stenosis: Recommend outpatient Cardiology follow up.     3. ROMIE on CKD stage 2: Repeat Basic Metabolic Panel within one week of discharge. Lasix should be given as follows: Lasix 40mg PO Q48H and Lasix 20mg PO Q48H. This is an alternating dosage schedule.    4. Vitamin D Deficiency: Continue supplementation as outpatient. Patient was admitted for encephalopathy. On admission UA showed 10 - 25 WBCs and urine cultures were negative. CT Head no contrast was done and showed no evidence of acute pathology along with chronic microvascular ischemic changes. The patient was started on linezolid on admission as she has a past history of VRE UTI. A venous duplex of the lower extremities was done and was negative for DVT. The patient was seen by psychiatry : recommended treating underlying medical conition including pain, stopping standing Seroquel because of age and CHF, frequent redirection, Seroquel PRN if redirection fails. Linezolid was subsequently discontinued per ID as urine cultures were negative. ROMIE was noted after admission and Lasix dose was readjusted. Urinary retention was subsequently noted and a Tapia catheter was placed and the patient was started on Flomax qhs. Worsening mentation was noted on April 21, repeat UA was unremarkable and repeat LE duplex performed was negative for DVT. Tapia catheter was removed on April 20 and placed back on April 21st as patient was retaining urine again.     The patient was discharged home with a Tapia with recommendations to follow up as outpatient with a urologist for urodynamic studies

## 2019-04-18 NOTE — PROGRESS NOTE ADULT - ASSESSMENT
#Encephalopathy vs progressive dementia   likely progressive demetnia with episode of behaviour disturbance   appreciate psych evalaution     #Chronic systolic and diastolic congestive heart failure   controlled     #Osteoarthritis - continue with pain management , and pt rehab    #. Moderate mitral valve regurgitation.     #. Mild-moderate tricuspid regurgitatio    #Severe aortic stenosis - op cardio eval     #ROMIE on CKD 2 - monitor creatinine baseline  approx 0.8, will decrease to lasix 40 mg q 48 , and lasix 20 mg q48 (alternate 20 and 40 daily ) since  bun is elevated , September 2018 noted BUN to be 20 , will ontinue to monitor   #Vitamin d deficiency oscal d \    #Immune throbocytopenia   platelet count stable      Progress Note Handoff    Pending:  romie improvement , pt rehab     Family discussion: left message with letty at 1134 am     Disposition: SNF #Encephalopathy vs progressive dementia   likely progressive demetnia with episode of behaviour disturbance   appreciate psych evalaution     #Chronic systolic and diastolic congestive heart failure   controlled     #Osteoarthritis - continue with pain management , and pt rehab    #. Moderate mitral valve regurgitation.     #. Mild-moderate tricuspid regurgitatio    #Severe aortic stenosis - op cardio eval     #ROMIE on CKD 2 - monitor creatinine baseline  approx 0.8, will decrease to lasix 40 mg q 48 , and lasix 20 mg q48 (alternate 20 and 40 daily ) since  bun is elevated , September 2018 noted BUN to be 20 , will ontinue to monitor   would consider BUn to be elevated to prednisone however patient was on prednisone in september as well   #Vitamin d deficiency oscal d \    #Immune throbocytopenia   platelet count stable      Progress Note Handoff    Pending:  romie improvement , pt rehab     Family discussion: left message with letty at 1134 am     Disposition: SNF

## 2019-04-18 NOTE — DISCHARGE NOTE PROVIDER - PROVIDER TOKENS
PROVIDER:[TOKEN:[[id number]],FOLLOWUP:[1 week]] PROVIDER:[TOKEN:[[id number]],FOLLOWUP:[1 week]],PROVIDER:[TOKEN:[[id number]],FOLLOWUP:[1 week]],PROVIDER:[TOKEN:[[id number]],FOLLOWUP:[1 week]]

## 2019-04-18 NOTE — PROGRESS NOTE ADULT - ASSESSMENT
SHELLEY PETIT 87y Female  MRN#: [id number]   CODE STATUS: FULL      SUBJECTIVE  Patient is a 87y old Female who presents with a chief complaint of agitation, AMS at night, uncomplicated cystitis (18 Apr 2019 13:48)    Currently admitted to medicine with the primary diagnosis of Alteration of mental status.    Today is hospital day 2d, and this morning she is laying in bed comfortably and reports no overnight events.   Denies chest pain, shortness of breath, nausea, vomiting or changes in bowel habits.   has no complaints today.     Present Today:           Tapia Catheter (x)No/ ()Yes?   Indication:             Central Line (x)No/ ()Yes?   Indication:          IV Fluids (x)No/ ()Yes? Type:  Rate:  Indication:    OBJECTIVE  PAST MEDICAL & SURGICAL HISTORY  Intestinal perforation  Diverticulitis  Ejection fraction < 50%  Pacemaker  CHF (congestive heart failure): HFrEF  Dementia  Cardiomyopathy  Immune thrombocytopenia  History of intestine removal  History of cholecystectomy  Artificial cardiac pacemaker    ALLERGIES:  No Known Allergies    HOME MEDICATIONS:  Home Medications:  Apriso: 1.2 gram(s) orally 2 times a day (04 Sep 2018 21:29)  calcium-vitamin D 500 mg-200 intl units oral tablet: 1 tab(s) orally once a day (18 Apr 2019 14:16)  cholestyramine 4 g/5 g oral powder for reconstitution: 1 dose(s) orally once a day (04 Sep 2018 21:29)  furosemide 40 mg oral tablet: Take Furosemide 40mg PO Q48H (i.e. Monday, Wednesday, Friday, Sunday) and Furosemide 20mg PO Q48H (i.e Tuesday, Thursday, Saturday) (18 Apr 2019 14:16)  losartan: 12.5 milligram(s) orally once a day (14 Sep 2018 11:02)  metoprolol succinate 25 mg oral tablet, extended release: 1 tab(s) orally once a day (14 Sep 2018 11:02)  predniSONE 5 mg oral tablet: 1 tab(s) orally once a day (04 Sep 2018 21:29)    MEDICATIONS:  STANDING MEDICATIONS  calcium carbonate 1250 mG  + Vitamin D (OsCal 500 + D) 1 Tablet(s) Oral daily  chlorhexidine 4% Liquid 1 Application(s) Topical <User Schedule>  cholestyramine Powder (Sugar-Free) 4 Gram(s) Oral daily  enoxaparin Injectable 40 milliGRAM(s) SubCutaneous every 24 hours  furosemide    Tablet 40 milliGRAM(s) Oral <User Schedule>  losartan 12.5 milliGRAM(s) Oral daily  mesalamine DR Capsule 1200 milliGRAM(s) Oral two times a day  metoprolol succinate ER 25 milliGRAM(s) Oral daily  predniSONE   Tablet 5 milliGRAM(s) Oral daily    PRN MEDICATIONS  acetaminophen    Suspension .. 650 milliGRAM(s) Oral every 6 hours PRN  lidocaine   Patch 2 Patch Transdermal every 24 hours PRN      VITAL SIGNS: Last 24 Hours  T(C): 36.1 (18 Apr 2019 14:03), Max: 36.2 (18 Apr 2019 05:38)  T(F): 97 (18 Apr 2019 14:03), Max: 97.1 (18 Apr 2019 05:38)  HR: 60 (18 Apr 2019 14:03) (60 - 60)  BP: 127/58 (18 Apr 2019 14:03) (127/58 - 162/73)  BP(mean): --  RR: 18 (18 Apr 2019 14:03) (17 - 18)  SpO2: 100% (18 Apr 2019 15:32) (98% - 100%)    LABS:                        10.8   6.36  )-----------( 213      ( 18 Apr 2019 05:30 )             34.2     04-18    143  |  106  |  39<H>  ----------------------------<  101<H>  4.0   |  23  |  1.2    Ca    8.3<L>      18 Apr 2019 05:30  Phos  4.9     04-17  Mg     2.1     04-18    TPro  5.7<L>  /  Alb  3.9  /  TBili  0.5  /  DBili  x   /  AST  18  /  ALT  15  /  AlkPhos  97  04-17        Culture - Urine (collected 16 Apr 2019 09:55)  Source: .Urine Clean Catch (Midstream)  Final Report (17 Apr 2019 19:20):    <10,000 CFU/mL Normal Urogenital Hali      RADIOLOGY:    No new imaging     PHYSICAL EXAM:    GENERAL: NAD, mildly cachectic, AAOx2-3 (improved)  HEENT:  Atraumatic, Normocephalic. EOMI, PERRLA, conjunctiva and sclera clear, No JVD  PULMONARY: Clear to auscultation bilaterally; No wheeze  CARDIOVASCULAR: Regular rate and rhythm; No murmurs, rubs, or gallops  GASTROINTESTINAL: Soft, Nontender, Nondistended; Bowel sounds present  MUSCULOSKELETAL:  2+ Peripheral Pulses, No clubbing, cyanosis, or edema  NEUROLOGY: non-focal  SKIN: No rashes or lesions    ASSESSMENT & PLAN    87/F, from home with , uses walker/Cane, has hx of HFrEF, PPM, b/l knee OA, diverticulitis, immune thrombocytopenia , dementia (A+O x1 - person) presented to ED c/o being more forgetful and agitated.    #) Acute Delirium with underlying Dementia   - not likely secondary to infection, possibly from chronic steroids   -UCx- negative  -Abx D/C'd       #) HFr EF  -lasix 40 mg daily  - daily weight    #) Acute Anemia  -Not hypotensive, unlikely active bleed  -hemoglobin 12.8->10.8  -will re-check CBC    #) Acute Kidney Injury on CKDIII  -Likely secondary to to Lasix, reduced to 40 daily previously   -Monitor Cr    #)  Immune thrombocytopenia     platelets stable    #) HTN  -on losartan , metoprolol    #) diverticulitis/ colitis  -on mesalamine and chronic steroids 5 mg daily  -cont calcium and vitamin d    DVT prophylaxis-enoxaparin    GI prophylaxis-pantoprazole 40 mg daily    Diet-DASH    Dispo- home in 24 hours

## 2019-04-19 LAB
ANION GAP SERPL CALC-SCNC: 16 MMOL/L — HIGH (ref 7–14)
APPEARANCE UR: CLEAR — SIGNIFICANT CHANGE UP
BASOPHILS # BLD AUTO: 0.02 K/UL — SIGNIFICANT CHANGE UP (ref 0–0.2)
BASOPHILS NFR BLD AUTO: 0.3 % — SIGNIFICANT CHANGE UP (ref 0–1)
BILIRUB UR-MCNC: NEGATIVE — SIGNIFICANT CHANGE UP
BUN SERPL-MCNC: 36 MG/DL — HIGH (ref 10–20)
CALCIUM SERPL-MCNC: 8.5 MG/DL — SIGNIFICANT CHANGE UP (ref 8.5–10.1)
CHLORIDE SERPL-SCNC: 104 MMOL/L — SIGNIFICANT CHANGE UP (ref 98–110)
CO2 SERPL-SCNC: 23 MMOL/L — SIGNIFICANT CHANGE UP (ref 17–32)
COLOR SPEC: YELLOW — SIGNIFICANT CHANGE UP
CREAT SERPL-MCNC: 1.2 MG/DL — SIGNIFICANT CHANGE UP (ref 0.7–1.5)
CULTURE RESULTS: SIGNIFICANT CHANGE UP
DIFF PNL FLD: ABNORMAL
EOSINOPHIL # BLD AUTO: 0.14 K/UL — SIGNIFICANT CHANGE UP (ref 0–0.7)
EOSINOPHIL NFR BLD AUTO: 1.9 % — SIGNIFICANT CHANGE UP (ref 0–8)
EPI CELLS # UR: ABNORMAL /HPF
FOLATE SERPL-MCNC: >20 NG/ML — SIGNIFICANT CHANGE UP
GLUCOSE SERPL-MCNC: 118 MG/DL — HIGH (ref 70–99)
GLUCOSE UR QL: NEGATIVE MG/DL — SIGNIFICANT CHANGE UP
HCT VFR BLD CALC: 36.3 % — LOW (ref 37–47)
HGB BLD-MCNC: 11.7 G/DL — LOW (ref 12–16)
IMM GRANULOCYTES NFR BLD AUTO: 0.7 % — HIGH (ref 0.1–0.3)
KETONES UR-MCNC: NEGATIVE — SIGNIFICANT CHANGE UP
LEUKOCYTE ESTERASE UR-ACNC: NEGATIVE — SIGNIFICANT CHANGE UP
LYMPHOCYTES # BLD AUTO: 1.49 K/UL — SIGNIFICANT CHANGE UP (ref 1.2–3.4)
LYMPHOCYTES # BLD AUTO: 19.9 % — LOW (ref 20.5–51.1)
MCHC RBC-ENTMCNC: 28.3 PG — SIGNIFICANT CHANGE UP (ref 27–31)
MCHC RBC-ENTMCNC: 32.2 G/DL — SIGNIFICANT CHANGE UP (ref 32–37)
MCV RBC AUTO: 87.9 FL — SIGNIFICANT CHANGE UP (ref 81–99)
MONOCYTES # BLD AUTO: 0.69 K/UL — HIGH (ref 0.1–0.6)
MONOCYTES NFR BLD AUTO: 9.2 % — SIGNIFICANT CHANGE UP (ref 1.7–9.3)
NEUTROPHILS # BLD AUTO: 5.09 K/UL — SIGNIFICANT CHANGE UP (ref 1.4–6.5)
NEUTROPHILS NFR BLD AUTO: 68 % — SIGNIFICANT CHANGE UP (ref 42.2–75.2)
NITRITE UR-MCNC: NEGATIVE — SIGNIFICANT CHANGE UP
NRBC # BLD: 0 /100 WBCS — SIGNIFICANT CHANGE UP (ref 0–0)
PH UR: 6 — SIGNIFICANT CHANGE UP (ref 5–8)
PLATELET # BLD AUTO: 248 K/UL — SIGNIFICANT CHANGE UP (ref 130–400)
POTASSIUM SERPL-MCNC: 4.3 MMOL/L — SIGNIFICANT CHANGE UP (ref 3.5–5)
POTASSIUM SERPL-SCNC: 4.3 MMOL/L — SIGNIFICANT CHANGE UP (ref 3.5–5)
PROT UR-MCNC: NEGATIVE MG/DL — SIGNIFICANT CHANGE UP
RBC # BLD: 4.13 M/UL — LOW (ref 4.2–5.4)
RBC # FLD: 13.1 % — SIGNIFICANT CHANGE UP (ref 11.5–14.5)
RBC CASTS # UR COMP ASSIST: SIGNIFICANT CHANGE UP /HPF
SODIUM SERPL-SCNC: 143 MMOL/L — SIGNIFICANT CHANGE UP (ref 135–146)
SP GR SPEC: 1.01 — SIGNIFICANT CHANGE UP (ref 1.01–1.03)
SPECIMEN SOURCE: SIGNIFICANT CHANGE UP
TSH SERPL-MCNC: 1.47 UIU/ML — SIGNIFICANT CHANGE UP (ref 0.27–4.2)
UROBILINOGEN FLD QL: 0.2 MG/DL — SIGNIFICANT CHANGE UP (ref 0.2–0.2)
VIT B12 SERPL-MCNC: 278 PG/ML — SIGNIFICANT CHANGE UP (ref 232–1245)
WBC # BLD: 7.48 K/UL — SIGNIFICANT CHANGE UP (ref 4.8–10.8)
WBC # FLD AUTO: 7.48 K/UL — SIGNIFICANT CHANGE UP (ref 4.8–10.8)

## 2019-04-19 RX ORDER — TAMSULOSIN HYDROCHLORIDE 0.4 MG/1
0.4 CAPSULE ORAL AT BEDTIME
Qty: 0 | Refills: 0 | Status: DISCONTINUED | OUTPATIENT
Start: 2019-04-19 | End: 2019-04-22

## 2019-04-19 RX ORDER — DOCUSATE SODIUM 100 MG
100 CAPSULE ORAL THREE TIMES A DAY
Qty: 0 | Refills: 0 | Status: DISCONTINUED | OUTPATIENT
Start: 2019-04-19 | End: 2019-04-22

## 2019-04-19 RX ORDER — TAMSULOSIN HYDROCHLORIDE 0.4 MG/1
0.8 CAPSULE ORAL AT BEDTIME
Qty: 0 | Refills: 0 | Status: DISCONTINUED | OUTPATIENT
Start: 2019-04-19 | End: 2019-04-19

## 2019-04-19 RX ORDER — QUETIAPINE FUMARATE 200 MG/1
12.5 TABLET, FILM COATED ORAL DAILY
Qty: 0 | Refills: 0 | Status: DISCONTINUED | OUTPATIENT
Start: 2019-04-19 | End: 2019-04-22

## 2019-04-19 RX ORDER — SENNA PLUS 8.6 MG/1
2 TABLET ORAL AT BEDTIME
Qty: 0 | Refills: 0 | Status: DISCONTINUED | OUTPATIENT
Start: 2019-04-19 | End: 2019-04-22

## 2019-04-19 RX ADMIN — CHOLESTYRAMINE 4 GRAM(S): 4 POWDER, FOR SUSPENSION ORAL at 11:26

## 2019-04-19 RX ADMIN — Medication 100 MILLIGRAM(S): at 17:29

## 2019-04-19 RX ADMIN — Medication 1 TABLET(S): at 11:25

## 2019-04-19 RX ADMIN — SENNA PLUS 2 TABLET(S): 8.6 TABLET ORAL at 21:44

## 2019-04-19 RX ADMIN — ENOXAPARIN SODIUM 40 MILLIGRAM(S): 100 INJECTION SUBCUTANEOUS at 17:26

## 2019-04-19 RX ADMIN — Medication 40 MILLIGRAM(S): at 05:30

## 2019-04-19 RX ADMIN — Medication 25 MILLIGRAM(S): at 05:31

## 2019-04-19 RX ADMIN — LOSARTAN POTASSIUM 12.5 MILLIGRAM(S): 100 TABLET, FILM COATED ORAL at 05:30

## 2019-04-19 RX ADMIN — Medication 1200 MILLIGRAM(S): at 05:31

## 2019-04-19 RX ADMIN — Medication 5 MILLIGRAM(S): at 05:31

## 2019-04-19 RX ADMIN — Medication 100 MILLIGRAM(S): at 21:44

## 2019-04-19 RX ADMIN — CHLORHEXIDINE GLUCONATE 1 APPLICATION(S): 213 SOLUTION TOPICAL at 05:30

## 2019-04-19 RX ADMIN — TAMSULOSIN HYDROCHLORIDE 0.4 MILLIGRAM(S): 0.4 CAPSULE ORAL at 21:44

## 2019-04-19 RX ADMIN — Medication 1200 MILLIGRAM(S): at 17:26

## 2019-04-19 NOTE — PROGRESS NOTE ADULT - SUBJECTIVE AND OBJECTIVE BOX
SHELLEY PETIT  87y  Female      Patient is a 87y old  Female who presents with a chief complaint of agitation, AMS at night, uncomplicated cystitis (2019 10:50)      INTERVAL HPI/OVERNIGHT EVENTS: mildly confused. found to be in urinary retention. not walking much today      REVIEW OF SYSTEMS:  as above  All other review of systems negative    T(C): 36.7 (19 @ 13:32), Max: 36.8 (19 @ 21:58)  HR: 59 (19 @ 13:32) (59 - 60)  BP: 113/53 (19 @ 13:32) (113/53 - 137/65)  RR: 17 (19 @ 13:32) (17 - 18)  SpO2: --  Wt(kg): --Vital Signs Last 24 Hrs  T(C): 36.7 (2019 13:32), Max: 36.8 (2019 21:58)  T(F): 98 (2019 13:32), Max: 98.2 (2019 21:58)  HR: 59 (2019 13:32) (59 - 60)  BP: 113/53 (2019 13:32) (113/53 - 137/65)  BP(mean): --  RR: 17 (2019 13:32) (17 - 18)  SpO2: --      19 @ 07:01  -  19 @ 07:00  --------------------------------------------------------  IN: 0 mL / OUT: 1650 mL / NET: -1650 mL    19 @ 07:01  -  19 @ 15:42  --------------------------------------------------------  IN: 0 mL / OUT: 1150 mL / NET: -1150 mL        PHYSICAL EXAM:  GENERAL: NAD  PSYCH: no agitation, with intermittent bouts of confusion  NERVOUS SYSTEM:  Alert & Oriented X2, no new focal deficits  PULMONARY: Clear to percussion bilaterally; No rales, rhonchi, wheezing, or rubs  CARDIOVASCULAR: Regular rate and rhythm; systolic murmur 4/6, no rubs, or gallops  GI: Soft, Nontender, Nondistended; Bowel sounds present   L suprapubic tenderness to palpation  EXTREMITIES:  2+ Peripheral Pulses, No clubbing, cyanosis, or edema    Consultant(s) Notes Reviewed:  [x ] YES  [ ] NO    Discussed with Consultants/Other Providers [ x] YES     LABS                          11.7   7.48  )-----------( 248      ( 2019 11:49 )             36.3     04-19    143  |  104  |  36<H>  ----------------------------<  118<H>  4.3   |  23  |  1.2    Ca    8.5      2019 11:49  Mg     2.1     18        Urinalysis Basic - ( 2019 10:54 )    Color: Yellow / Appearance: Clear / S.015 / pH: x  Gluc: x / Ketone: Negative  / Bili: Negative / Urobili: 0.2 mg/dL   Blood: x / Protein: Negative mg/dL / Nitrite: Negative   Leuk Esterase: Negative / RBC: 1-2 /HPF / WBC x   Sq Epi: x / Non Sq Epi: Occasional /HPF / Bacteria: x        Lactate Trend        CAPILLARY BLOOD GLUCOSE            RADIOLOGY & ADDITIONAL TESTS:    Imaging Personally Reviewed:  [ ] YES  [ ] NO    HEALTH ISSUES - PROBLEM Dx:          #Progress Note Handoff    Pending (specify):  Consults_________, Tests________, Test Results_______, Other_________    Family discussion:    Disposition: Home___/SNF___/Other________/Unknown at this time________

## 2019-04-19 NOTE — PROGRESS NOTE ADULT - ASSESSMENT
SHELLEY PETIT 87y Female  MRN#: [id number]   CODE STATUS: FULL      SUBJECTIVE  Patient is a 87y old Female who presents with a chief complaint of agitation, AMS at night, uncomplicated cystitis (18 Apr 2019 19:19)    Currently admitted to medicine with the primary diagnosis of AMS.     Today is hospital day 3d, and this morning she is laying in bed comfortably and reports no overnight events.   She denies chest pain, shortness of breath, nausea, vomiting or changes in bowel habits.   She is complaining of abdominal pain around her bladder and is having difficulty urinating.     Present Today:           Tapia Catheter ()No/ (x)Yes? Indwelling Urethral Catheter    Indication:   Retention          Central Line (x)No/ ()Yes?   Indication:          IV Fluids (x)No/ ()Yes? Type:  Rate:  Indication:    OBJECTIVE  PAST MEDICAL & SURGICAL HISTORY  Intestinal perforation  Diverticulitis  Ejection fraction < 50%  Pacemaker  CHF (congestive heart failure): HFrEF  Dementia  Cardiomyopathy  Immune thrombocytopenia  History of intestine removal  History of cholecystectomy  Artificial cardiac pacemaker    ALLERGIES:  No Known Allergies    HOME MEDICATIONS:  Home Medications:  Apriso: 1.2 gram(s) orally 2 times a day (04 Sep 2018 21:29)  calcium-vitamin D 500 mg-200 intl units oral tablet: 1 tab(s) orally once a day (18 Apr 2019 14:16)  cholestyramine 4 g/5 g oral powder for reconstitution: 1 dose(s) orally once a day (04 Sep 2018 21:29)  furosemide 40 mg oral tablet: Take Furosemide 40mg PO Q48H (i.e. Monday, Wednesday, Friday, Sunday) and Furosemide 20mg PO Q48H (i.e Tuesday, Thursday, Saturday) (18 Apr 2019 14:16)  losartan: 12.5 milligram(s) orally once a day (14 Sep 2018 11:02)  metoprolol succinate 25 mg oral tablet, extended release: 1 tab(s) orally once a day (14 Sep 2018 11:02)  predniSONE 5 mg oral tablet: 1 tab(s) orally once a day (04 Sep 2018 21:29)    MEDICATIONS:  STANDING MEDICATIONS  calcium carbonate 1250 mG  + Vitamin D (OsCal 500 + D) 1 Tablet(s) Oral daily  chlorhexidine 4% Liquid 1 Application(s) Topical <User Schedule>  cholestyramine Powder (Sugar-Free) 4 Gram(s) Oral daily  docusate sodium 100 milliGRAM(s) Oral three times a day  enoxaparin Injectable 40 milliGRAM(s) SubCutaneous every 24 hours  furosemide    Tablet 40 milliGRAM(s) Oral <User Schedule>  losartan 12.5 milliGRAM(s) Oral daily  mesalamine DR Capsule 1200 milliGRAM(s) Oral two times a day  metoprolol succinate ER 25 milliGRAM(s) Oral daily  predniSONE   Tablet 5 milliGRAM(s) Oral daily  senna 2 Tablet(s) Oral at bedtime  tamsulosin 0.4 milliGRAM(s) Oral at bedtime    PRN MEDICATIONS  acetaminophen    Suspension .. 650 milliGRAM(s) Oral every 6 hours PRN  lidocaine   Patch 2 Patch Transdermal every 24 hours PRN  QUEtiapine 12.5 milliGRAM(s) Oral daily PRN      VITAL SIGNS: Last 24 Hours  T(C): 35.7 (19 Apr 2019 05:41), Max: 36.8 (18 Apr 2019 21:58)  T(F): 96.2 (19 Apr 2019 05:41), Max: 98.2 (18 Apr 2019 21:58)  HR: 59 (19 Apr 2019 05:41) (59 - 60)  BP: 137/65 (19 Apr 2019 05:41) (127/58 - 137/65)  BP(mean): --  RR: 17 (19 Apr 2019 05:41) (17 - 18)  SpO2: 100% (18 Apr 2019 15:32) (100% - 100%)    LABS:                        10.8   6.36  )-----------( 213      ( 18 Apr 2019 05:30 )             34.2     04-18    143  |  106  |  39<H>  ----------------------------<  101<H>  4.0   |  23  |  1.2    Ca    8.3<L>      18 Apr 2019 05:30  Mg     2.1     04-18        RADIOLOGY:    No new imaging       PHYSICAL EXAM:    GENERAL: NAD, mildly cachectic, AAOx2-3 (improved)  HEENT:  Atraumatic, Normocephalic. EOMI, PERRLA, conjunctiva and sclera clear, No JVD  PULMONARY: Clear to auscultation bilaterally; No wheeze  CARDIOVASCULAR: Regular rate and rhythm; No murmurs, rubs, or gallops  GASTROINTESTINAL: Soft, Nontender, Nondistended; Bowel sounds present  MUSCULOSKELETAL:  2+ Peripheral Pulses, No clubbing, cyanosis, or edema  NEUROLOGY: non-focal  SKIN: No rashes or lesions    ASSESSMENT & PLAN  87/F, from home with , uses walker/Cane, has hx of HFrEF, PPM, b/l knee OA, diverticulitis, immune thrombocytopenia , dementia (A+O x1 - person) presented to ED c/o being more forgetful and agitated.    #) Urinary retention  -had abd pain, bladder distention this AM  -Bladder scan ~800cc  -Tapia placed, draining urine  -send U/A  -FLOMAX started  -will check Cr    #) Acute Delirium with underlying Dementia   -not likely secondary to infection, possibly from chronic steroids   -UCx- negative  -Abx D/C'd      #) HFr EF; stable   -lasix 40/20 every other day alternating   -daily weights    #) Acute Anemia  -Not hypotensive, unlikely active bleed  -hemoglobin 12.8->10.8  -will re-check CBC    #) Acute Kidney Injury on CKDIII; worsening   -Likely secondary to Lasix, reduced  -Monitor Cr    #) Immune thrombocytopenia; stable  -platelets stable    #) HTN; stable   -on losartan , metoprolol    #) Diverticulitis/ colitis; stable   -on mesalamine and chronic steroids 5 mg daily  -cont calcium and vitamin d    DVT prophylaxis-enoxaparin    GI prophylaxis-pantoprazole 40 mg daily    Diet-DASH    Dispo- home in 24 hours SHELLEY PETIT 87y Female  MRN#: [id number]   CODE STATUS: FULL      SUBJECTIVE  Patient is a 87y old Female who presents with a chief complaint of agitation, AMS at night, uncomplicated cystitis (18 Apr 2019 19:19)    Currently admitted to medicine with the primary diagnosis of AMS.     Today is hospital day 3d, and this morning she is laying in bed comfortably and reports no overnight events.   She denies chest pain, shortness of breath, nausea, vomiting or changes in bowel habits.   She is complaining of abdominal pain around her bladder and is having difficulty urinating.     Present Today:           Tapia Catheter ()No/ (x)Yes? Indwelling Urethral Catheter    Indication:   Retention          Central Line (x)No/ ()Yes?   Indication:          IV Fluids (x)No/ ()Yes? Type:  Rate:  Indication:    OBJECTIVE  PAST MEDICAL & SURGICAL HISTORY  Intestinal perforation  Diverticulitis  Ejection fraction < 50%  Pacemaker  CHF (congestive heart failure): HFrEF  Dementia  Cardiomyopathy  Immune thrombocytopenia  History of intestine removal  History of cholecystectomy  Artificial cardiac pacemaker    ALLERGIES:  No Known Allergies    HOME MEDICATIONS:  Home Medications:  Apriso: 1.2 gram(s) orally 2 times a day (04 Sep 2018 21:29)  calcium-vitamin D 500 mg-200 intl units oral tablet: 1 tab(s) orally once a day (18 Apr 2019 14:16)  cholestyramine 4 g/5 g oral powder for reconstitution: 1 dose(s) orally once a day (04 Sep 2018 21:29)  furosemide 40 mg oral tablet: Take Furosemide 40mg PO Q48H (i.e. Monday, Wednesday, Friday, Sunday) and Furosemide 20mg PO Q48H (i.e Tuesday, Thursday, Saturday) (18 Apr 2019 14:16)  losartan: 12.5 milligram(s) orally once a day (14 Sep 2018 11:02)  metoprolol succinate 25 mg oral tablet, extended release: 1 tab(s) orally once a day (14 Sep 2018 11:02)  predniSONE 5 mg oral tablet: 1 tab(s) orally once a day (04 Sep 2018 21:29)    MEDICATIONS:  STANDING MEDICATIONS  calcium carbonate 1250 mG  + Vitamin D (OsCal 500 + D) 1 Tablet(s) Oral daily  chlorhexidine 4% Liquid 1 Application(s) Topical <User Schedule>  cholestyramine Powder (Sugar-Free) 4 Gram(s) Oral daily  docusate sodium 100 milliGRAM(s) Oral three times a day  enoxaparin Injectable 40 milliGRAM(s) SubCutaneous every 24 hours  furosemide    Tablet 40 milliGRAM(s) Oral <User Schedule>  losartan 12.5 milliGRAM(s) Oral daily  mesalamine DR Capsule 1200 milliGRAM(s) Oral two times a day  metoprolol succinate ER 25 milliGRAM(s) Oral daily  predniSONE   Tablet 5 milliGRAM(s) Oral daily  senna 2 Tablet(s) Oral at bedtime  tamsulosin 0.4 milliGRAM(s) Oral at bedtime    PRN MEDICATIONS  acetaminophen    Suspension .. 650 milliGRAM(s) Oral every 6 hours PRN  lidocaine   Patch 2 Patch Transdermal every 24 hours PRN  QUEtiapine 12.5 milliGRAM(s) Oral daily PRN      VITAL SIGNS: Last 24 Hours  T(C): 35.7 (19 Apr 2019 05:41), Max: 36.8 (18 Apr 2019 21:58)  T(F): 96.2 (19 Apr 2019 05:41), Max: 98.2 (18 Apr 2019 21:58)  HR: 59 (19 Apr 2019 05:41) (59 - 60)  BP: 137/65 (19 Apr 2019 05:41) (127/58 - 137/65)  BP(mean): --  RR: 17 (19 Apr 2019 05:41) (17 - 18)  SpO2: 100% (18 Apr 2019 15:32) (100% - 100%)    LABS:                        10.8   6.36  )-----------( 213      ( 18 Apr 2019 05:30 )             34.2     04-18    143  |  106  |  39<H>  ----------------------------<  101<H>  4.0   |  23  |  1.2    Ca    8.3<L>      18 Apr 2019 05:30  Mg     2.1     04-18        RADIOLOGY:    No new imaging       PHYSICAL EXAM:    GENERAL: NAD, mildly cachectic, AAOx2-3 (improved)  HEENT:  Atraumatic, Normocephalic. EOMI, PERRLA, conjunctiva and sclera clear, No JVD  PULMONARY: Clear to auscultation bilaterally; No wheeze  CARDIOVASCULAR: Regular rate and rhythm; No murmurs, rubs, or gallops  GASTROINTESTINAL: Soft, Nontender, Nondistended; Bowel sounds present  MUSCULOSKELETAL:  2+ Peripheral Pulses, No clubbing, cyanosis, or edema  NEUROLOGY: non-focal  SKIN: No rashes or lesions    ASSESSMENT & PLAN  87/F, from home with , uses walker/Cane, has hx of HFrEF, PPM, b/l knee OA, diverticulitis, immune thrombocytopenia , dementia (A+O x1 - person) presented to ED c/o being more forgetful and agitated.    #) Urinary retention  -had abd pain, bladder distention this AM  -Bladder scan ~800cc  -Tapia placed, draining urine  -U/A negative  -FLOMAX started  -Cr stable    #) Acute Delirium with underlying Dementia   -not likely secondary to infection, possibly from chronic steroids   -UCx- negative  -Abx D/C'd      #) HFr EF; stable   -lasix 40/20 every other day alternating   -daily weights    #) Acute Anemia  -Not hypotensive, unlikely active bleed  -hemoglobin 12.8->10.8  -will re-check CBC    #) Acute Kidney Injury on CKDIII; worsening   -Likely secondary to Lasix, reduced  -Monitor Cr    #) Immune thrombocytopenia; stable  -platelets stable    #) HTN; stable   -on losartan , metoprolol    #) Diverticulitis/ colitis; stable   -on mesalamine and chronic steroids 5 mg daily  -cont calcium and vitamin d    DVT prophylaxis-enoxaparin    GI prophylaxis-pantoprazole 40 mg daily    Diet-DASH    Dispo- home in 24 hours

## 2019-04-19 NOTE — PROGRESS NOTE ADULT - ASSESSMENT
86yo F c PMHx of HFrEF, PPM, b/l knee OA, diverticulitis, immune thrombocytopenia (Onc - Dr. Haskins), dementia here with    #Encephalopathy vs progressive dementia   unable to discern if patients change in sensory is secondary to advanced dementia and change of environment, or if this is metabolic encephalopathy secondary to urinary retention  redirectable today    #Urinary retention  >1000 cc, check ua/cx  box for now  walk with staff today and tomorrow, tomorrow trial of voiding  empiric qhs flomax    #Chronic systolic and diastolic congestive heart failure   controlled     #Osteoarthritis - continue with pain management , and pt rehab    #. Moderate mitral valve regurgitation.     #. Mild-moderate tricuspid regurgitatio    #Severe aortic stenosis - op cardio eval     #ROMIE on CKD 2 - monitor creatinine baseline  approx 0.8, decreased lasix 40 mg q 48 , and lasix 20 mg q48 (alternate 20 and 40 daily ) since  bun is elevated , September 2018 noted BUN to be 20 , will continue to monitor   would consider BUn to be elevated to prednisone however patient was on prednisone in september as well   #Vitamin d deficiency oscal d \    #Immune throbocytopenia   platelet count stable      #Functional debility  ambulate with staff and PT today    Progress Note Handoff    Pending:  needs to walk/ has functional debility, also with new urinary retention, will need to repeat TOV tomorrow after patient walks more    Family discussion: discussed with family at the bedside today    Disposition: SNF

## 2019-04-20 RX ORDER — TAMSULOSIN HYDROCHLORIDE 0.4 MG/1
1 CAPSULE ORAL
Qty: 0 | Refills: 0 | COMMUNITY
Start: 2019-04-20

## 2019-04-20 RX ADMIN — QUETIAPINE FUMARATE 12.5 MILLIGRAM(S): 200 TABLET, FILM COATED ORAL at 23:26

## 2019-04-20 RX ADMIN — Medication 1200 MILLIGRAM(S): at 17:14

## 2019-04-20 RX ADMIN — Medication 25 MILLIGRAM(S): at 05:50

## 2019-04-20 RX ADMIN — Medication 1200 MILLIGRAM(S): at 05:49

## 2019-04-20 RX ADMIN — Medication 20 MILLIGRAM(S): at 05:49

## 2019-04-20 RX ADMIN — LOSARTAN POTASSIUM 12.5 MILLIGRAM(S): 100 TABLET, FILM COATED ORAL at 05:49

## 2019-04-20 RX ADMIN — TAMSULOSIN HYDROCHLORIDE 0.4 MILLIGRAM(S): 0.4 CAPSULE ORAL at 21:08

## 2019-04-20 RX ADMIN — Medication 1 TABLET(S): at 11:31

## 2019-04-20 RX ADMIN — Medication 100 MILLIGRAM(S): at 13:16

## 2019-04-20 RX ADMIN — Medication 5 MILLIGRAM(S): at 05:50

## 2019-04-20 RX ADMIN — CHLORHEXIDINE GLUCONATE 1 APPLICATION(S): 213 SOLUTION TOPICAL at 05:48

## 2019-04-20 RX ADMIN — Medication 100 MILLIGRAM(S): at 05:49

## 2019-04-20 RX ADMIN — ENOXAPARIN SODIUM 40 MILLIGRAM(S): 100 INJECTION SUBCUTANEOUS at 13:17

## 2019-04-20 RX ADMIN — CHOLESTYRAMINE 4 GRAM(S): 4 POWDER, FOR SUSPENSION ORAL at 11:31

## 2019-04-20 NOTE — PROGRESS NOTE ADULT - ASSESSMENT
88yo F c PMHx of HFrEF, PPM, b/l knee OA, diverticulitis, immune thrombocytopenia (Onc - Dr. Haskins), dementia here with    # Altered mental status  2/2 Metabolic Encephalopathy vs progressive dementia     Improved mentation.     #Urinary retention  S/P box  >> Will do TOV today.  empiric qhs flomax    #Chronic systolic and diastolic congestive heart failure    Stable.    #Osteoarthritis - continue with pain management , and pt rehab    #. Moderate mitral valve regurgitation.     #. Mild-moderate tricuspid regurgitatio    #Severe aortic stenosis - op cardio eval     #ROMIE on CKD 2 - monitor creatinine baseline  approx 0.8, Adjusted lasix doses.    Stable .    #Vitamin d deficiency oscal d \    #Immune throbocytopenia   platelet count stable      #Functional debility  ambulate with staff and PT today      Progress Note Handoff    Pending: TOV today.    Family discussion: discussed with family at the bedside .    Disposition: Home with [facility].

## 2019-04-20 NOTE — PROGRESS NOTE ADULT - ASSESSMENT
SHELLEY PETIT 87y Female  MRN#: [id number]   CODE STATUS: FULL      SUBJECTIVE  Patient is a 87y old Female who presents with a chief complaint of agitation, AMS at night, uncomplicated cystitis (2019 11:57)    Currently admitted to medicine with the primary diagnosis of     Today is hospital day 4d, and this morning she is laying in bed comfortably and reports no overnight events.   Denies chest pain, shortness of breath, nausea, vomiting or changes in bowel habits.   has no complaints today.     Present Today:           Tapia Catheter (x)No/ ()Yes?   Indication:             Central Line (x)No/ ()Yes?   Indication:          IV Fluids (x)No/ ()Yes? Type:  Rate:  Indication:    OBJECTIVE  PAST MEDICAL & SURGICAL HISTORY  Intestinal perforation  Diverticulitis  Ejection fraction < 50%  Pacemaker  CHF (congestive heart failure): HFrEF  Dementia  Cardiomyopathy  Immune thrombocytopenia  History of intestine removal  History of cholecystectomy  Artificial cardiac pacemaker    ALLERGIES:  No Known Allergies    HOME MEDICATIONS:  Home Medications:  Apriso: 1.2 gram(s) orally 2 times a day (04 Sep 2018 21:29)  calcium-vitamin D 500 mg-200 intl units oral tablet: 1 tab(s) orally once a day (2019 14:16)  cholestyramine 4 g/5 g oral powder for reconstitution: 1 dose(s) orally once a day (04 Sep 2018 21:29)  furosemide 40 mg oral tablet: Take Furosemide 40mg PO Q48H (i.e. Monday, Wednesday, Friday, ) and Furosemide 20mg PO Q48H (i.e Tuesday, Thursday, Saturday) (2019 14:16)  losartan: 12.5 milligram(s) orally once a day (14 Sep 2018 11:02)  metoprolol succinate 25 mg oral tablet, extended release: 1 tab(s) orally once a day (14 Sep 2018 11:02)  predniSONE 5 mg oral tablet: 1 tab(s) orally once a day (04 Sep 2018 21:29)  tamsulosin 0.4 mg oral capsule: 1 cap(s) orally once a day (at bedtime) (2019 12:04)    MEDICATIONS:  STANDING MEDICATIONS  calcium carbonate 1250 mG  + Vitamin D (OsCal 500 + D) 1 Tablet(s) Oral daily  chlorhexidine 4% Liquid 1 Application(s) Topical <User Schedule>  cholestyramine Powder (Sugar-Free) 4 Gram(s) Oral daily  docusate sodium 100 milliGRAM(s) Oral three times a day  enoxaparin Injectable 40 milliGRAM(s) SubCutaneous every 24 hours  furosemide    Tablet 40 milliGRAM(s) Oral <User Schedule>  furosemide    Tablet 20 milliGRAM(s) Oral <User Schedule>  losartan 12.5 milliGRAM(s) Oral daily  mesalamine DR Capsule 1200 milliGRAM(s) Oral two times a day  metoprolol succinate ER 25 milliGRAM(s) Oral daily  predniSONE   Tablet 5 milliGRAM(s) Oral daily  senna 2 Tablet(s) Oral at bedtime  tamsulosin 0.4 milliGRAM(s) Oral at bedtime    PRN MEDICATIONS  acetaminophen    Suspension .. 650 milliGRAM(s) Oral every 6 hours PRN  lidocaine   Patch 2 Patch Transdermal every 24 hours PRN  QUEtiapine 12.5 milliGRAM(s) Oral daily PRN      VITAL SIGNS: Last 24 Hours  T(C): 36.3 (2019 06:00), Max: 36.7 (2019 13:32)  T(F): 97.4 (2019 06:00), Max: 98 (2019 13:32)  HR: 65 (2019 06:00) (59 - 65)  BP: 122/59 (2019 06:00) (113/53 - 122/59)  BP(mean): --  RR: 18 (2019 06:00) (17 - 18)  SpO2: --    LABS:                        11.7   7.48  )-----------( 248      ( 2019 11:49 )             36.3     04-19    143  |  104  |  36<H>  ----------------------------<  118<H>  4.3   |  23  |  1.2    Ca    8.5      2019 11:49        Urinalysis Basic - ( 2019 10:54 )    Color: Yellow / Appearance: Clear / S.015 / pH: x  Gluc: x / Ketone: Negative  / Bili: Negative / Urobili: 0.2 mg/dL   Blood: x / Protein: Negative mg/dL / Nitrite: Negative   Leuk Esterase: Negative / RBC: 1-2 /HPF / WBC x   Sq Epi: x / Non Sq Epi: Occasional /HPF / Bacteria: x                RADIOLOGY:      PHYSICAL EXAM:    GENERAL: NAD, well-developed, AAOx3  HEENT:  Atraumatic, Normocephalic. EOMI, PERRLA, conjunctiva and sclera clear, No JVD  PULMONARY: Clear to auscultation bilaterally; No wheeze  CARDIOVASCULAR: Regular rate and rhythm; No murmurs, rubs, or gallops  GASTROINTESTINAL: Soft, Nontender, Nondistended; Bowel sounds present  MUSCULOSKELETAL:  2+ Peripheral Pulses, No clubbing, cyanosis, or edema  NEUROLOGY: non-focal  SKIN: No rashes or lesions    ASSESSMENT & PLAN SHELLEY PETIT 87y Female  MRN#: [id number]   CODE STATUS: FULL      SUBJECTIVE  Patient is a 87y old Female who presents with a chief complaint of agitation, AMS at night, uncomplicated cystitis (2019 11:57)    Currently admitted to medicine with the primary diagnosis of resolved AMS, Urinary retention.     Today is hospital day 4d, and this morning she is laying in bed comfortably and reports no overnight events.  She denies chest pain, shortness of breath, nausea, vomiting or changes in bowel habits.   She has no complaints today and wants to go home.    Present Today:           Tapia Catheter (x)No/ ()Yes?   Indication:             Central Line (x)No/ ()Yes?   Indication:          IV Fluids (x)No/ ()Yes? Type:  Rate:  Indication:    OBJECTIVE  PAST MEDICAL & SURGICAL HISTORY  Intestinal perforation  Diverticulitis  Ejection fraction < 50%  Pacemaker  CHF (congestive heart failure): HFrEF  Dementia  Cardiomyopathy  Immune thrombocytopenia  History of intestine removal  History of cholecystectomy  Artificial cardiac pacemaker    ALLERGIES:  No Known Allergies    HOME MEDICATIONS:  Home Medications:  Apriso: 1.2 gram(s) orally 2 times a day (04 Sep 2018 21:29)  calcium-vitamin D 500 mg-200 intl units oral tablet: 1 tab(s) orally once a day (2019 14:16)  cholestyramine 4 g/5 g oral powder for reconstitution: 1 dose(s) orally once a day (04 Sep 2018 21:29)  furosemide 40 mg oral tablet: Take Furosemide 40mg PO Q48H (i.e. Monday, Wednesday, Friday, ) and Furosemide 20mg PO Q48H (i.e Tuesday, Thursday, Saturday) (2019 14:16)  losartan: 12.5 milligram(s) orally once a day (14 Sep 2018 11:02)  metoprolol succinate 25 mg oral tablet, extended release: 1 tab(s) orally once a day (14 Sep 2018 11:02)  predniSONE 5 mg oral tablet: 1 tab(s) orally once a day (04 Sep 2018 21:29)  tamsulosin 0.4 mg oral capsule: 1 cap(s) orally once a day (at bedtime) (2019 12:04)    MEDICATIONS:  STANDING MEDICATIONS  calcium carbonate 1250 mG  + Vitamin D (OsCal 500 + D) 1 Tablet(s) Oral daily  chlorhexidine 4% Liquid 1 Application(s) Topical <User Schedule>  cholestyramine Powder (Sugar-Free) 4 Gram(s) Oral daily  docusate sodium 100 milliGRAM(s) Oral three times a day  enoxaparin Injectable 40 milliGRAM(s) SubCutaneous every 24 hours  furosemide    Tablet 40 milliGRAM(s) Oral <User Schedule>  furosemide    Tablet 20 milliGRAM(s) Oral <User Schedule>  losartan 12.5 milliGRAM(s) Oral daily  mesalamine DR Capsule 1200 milliGRAM(s) Oral two times a day  metoprolol succinate ER 25 milliGRAM(s) Oral daily  predniSONE   Tablet 5 milliGRAM(s) Oral daily  senna 2 Tablet(s) Oral at bedtime  tamsulosin 0.4 milliGRAM(s) Oral at bedtime    PRN MEDICATIONS  acetaminophen    Suspension .. 650 milliGRAM(s) Oral every 6 hours PRN  lidocaine   Patch 2 Patch Transdermal every 24 hours PRN  QUEtiapine 12.5 milliGRAM(s) Oral daily PRN      VITAL SIGNS: Last 24 Hours  T(C): 36.3 (2019 06:00), Max: 36.7 (2019 13:32)  T(F): 97.4 (2019 06:00), Max: 98 (2019 13:32)  HR: 65 (2019 06:00) (59 - 65)  BP: 122/59 (2019 06:00) (113/53 - 122/59)  BP(mean): --  RR: 18 (2019 06:00) (17 - 18)  SpO2: --    LABS:                        11.7   7.48  )-----------( 248      ( 2019 11:49 )             36.3     04-    143  |  104  |  36<H>  ----------------------------<  118<H>  4.3   |  23  |  1.2    Ca    8.5      2019 11:49        Urinalysis Basic - ( 2019 10:54 )    Color: Yellow / Appearance: Clear / S.015 / pH: x  Gluc: x / Ketone: Negative  / Bili: Negative / Urobili: 0.2 mg/dL   Blood: x / Protein: Negative mg/dL / Nitrite: Negative   Leuk Esterase: Negative / RBC: 1-2 /HPF / WBC x   Sq Epi: x / Non Sq Epi: Occasional /HPF / Bacteria: x      RADIOLOGY:    No new imaging     PHYSICAL EXAM:    GENERAL: NAD, well-developed, AAOx2/3  HEENT:  Atraumatic, Normocephalic. EOMI, PERRLA, conjunctiva and sclera clear, No JVD  PULMONARY: Clear to auscultation bilaterally; No wheeze  CARDIOVASCULAR: Regular rate and rhythm; No murmurs, rubs, or gallops  GASTROINTESTINAL: Soft, Nontender, Nondistended; Bowel sounds present  MUSCULOSKELETAL:  2+ Peripheral Pulses, No clubbing, cyanosis, or edema  NEUROLOGY: non-focal  SKIN: No rashes or lesions    ASSESSMENT & PLAN    87/F, from home with , uses walker/Cane, has hx of HFrEF, PPM, b/l knee OA, diverticulitis, immune thrombocytopenia , dementia (A+O x1 - person) presented to ED c/o being more forgetful and agitated.    #) Urinary retention  -had abd pain, bladder distention   -Tapia D/C'd, TOV today  -U/A negative  -Cont Flowmax  -Cr stable    #) Acute Delirium with underlying Dementia; improved   -not likely secondary to infection, possibly from chronic steroids       #) HFr EF; stable   -lasix 40/20 every other day alternating     #) Acute Anemia; resolved  -Not hypotensive, unlikely active bleed, likely dilutional   -hemoglobin 12.8->10.80>11.7    #) Acute Kidney Injury on CKDIII; resolved   -Likely secondary to Lasix, reduced  -Cr stable     #) Immune thrombocytopenia; stable  -platelets stable    #) HTN; stable   -on losartan , metoprolol    #) Diverticulitis/ colitis; stable   -on mesalamine and chronic steroids 5 mg daily  -cont calcium and vitamin d    DVT prophylaxis-enoxaparin    GI prophylaxis-pantoprazole 40 mg daily    Diet-DASH    Dispo- home when stable

## 2019-04-20 NOTE — PROGRESS NOTE ADULT - SUBJECTIVE AND OBJECTIVE BOX
SHELLEY PETIT  87y  Female      Patient is a 87y old  Female who presents with a chief complaint of agitation, AMS at night, uncomplicated cystitis (2019 15:42)      INTERVAL HPI/OVERNIGHT EVENTS:      ******************************* REVIEW OF SYSTEMS:**********************************************  Retained urine, s/p Tapia cath.  All other review of systems negative    *********************** VITALS ******************************************    T(F): 97.4 (19 @ 06:00)  HR: 65 (19 @ 06:00) (59 - 65)  BP: 122/59 (19 @ 06:00) (113/53 - 122/59)  RR: 18 (19 @ 06:00) (17 - 18)  SpO2: --    19 @ 07:01  -  19 @ 07:00  --------------------------------------------------------  IN: 0 mL / OUT: 1800 mL / NET: -1800 mL            19 @ 07:01  -  19 @ 07:00  --------------------------------------------------------  IN: 0 mL / OUT: 1800 mL / NET: -1800 mL        ******************************** PHYSICAL EXAM:**************************************************  GENERAL: NAD    PSYCH: no agitation, baseline mentation  HEENT:     NERVOUS SYSTEM:  Alert & Oriented X2-3,     PULMONARY: NILES, CTA    CARDIOVASCULAR: S1S2 RRR    GI: Soft, NT, ND; BS present.    EXTREMITIES:  2+ Peripheral Pulses, No clubbing, cyanosis, or edema    LYMPH: No lymphadenopathy noted    SKIN: No rashes or lesions    ******************************************************************************************    Consultant(s) Notes Reviewed:  [x ] YES  [ ] NO    Discussed with Consultants/Other Providers [ x] YES     **************************** LABS *******************************************************                          11.7   7.48  )-----------( 248      ( 2019 11:49 )             36.3     04-19    143  |  104  |  36<H>  ----------------------------<  118<H>  4.3   |  23  |  1.2    Ca    8.5      2019 11:49        Urinalysis Basic - ( 2019 10:54 )    Color: Yellow / Appearance: Clear / S.015 / pH: x  Gluc: x / Ketone: Negative  / Bili: Negative / Urobili: 0.2 mg/dL   Blood: x / Protein: Negative mg/dL / Nitrite: Negative   Leuk Esterase: Negative / RBC: 1-2 /HPF / WBC x   Sq Epi: x / Non Sq Epi: Occasional /HPF / Bacteria: x        Lactate Trend        CAPILLARY BLOOD GLUCOSE              **************************Active Medications *******************************************  No Known Allergies      acetaminophen    Suspension .. 650 milliGRAM(s) Oral every 6 hours PRN  calcium carbonate 1250 mG  + Vitamin D (OsCal 500 + D) 1 Tablet(s) Oral daily  chlorhexidine 4% Liquid 1 Application(s) Topical <User Schedule>  cholestyramine Powder (Sugar-Free) 4 Gram(s) Oral daily  docusate sodium 100 milliGRAM(s) Oral three times a day  enoxaparin Injectable 40 milliGRAM(s) SubCutaneous every 24 hours  furosemide    Tablet 40 milliGRAM(s) Oral <User Schedule>  furosemide    Tablet 20 milliGRAM(s) Oral <User Schedule>  lidocaine   Patch 2 Patch Transdermal every 24 hours PRN  losartan 12.5 milliGRAM(s) Oral daily  mesalamine DR Capsule 1200 milliGRAM(s) Oral two times a day  metoprolol succinate ER 25 milliGRAM(s) Oral daily  predniSONE   Tablet 5 milliGRAM(s) Oral daily  QUEtiapine 12.5 milliGRAM(s) Oral daily PRN  senna 2 Tablet(s) Oral at bedtime  tamsulosin 0.4 milliGRAM(s) Oral at bedtime      ***************************************************  RADIOLOGY & ADDITIONAL TESTS:    Imaging Personally Reviewed:  [ ] YES  [ ] NO    HEALTH ISSUES - PROBLEM Dx:

## 2019-04-21 LAB
APPEARANCE UR: ABNORMAL
BACTERIA # UR AUTO: ABNORMAL /HPF
BILIRUB UR-MCNC: NEGATIVE — SIGNIFICANT CHANGE UP
COLOR SPEC: YELLOW — SIGNIFICANT CHANGE UP
DIFF PNL FLD: ABNORMAL
EPI CELLS # UR: ABNORMAL /HPF
GLUCOSE UR QL: NEGATIVE MG/DL — SIGNIFICANT CHANGE UP
KETONES UR-MCNC: NEGATIVE — SIGNIFICANT CHANGE UP
LEUKOCYTE ESTERASE UR-ACNC: ABNORMAL
NITRITE UR-MCNC: NEGATIVE — SIGNIFICANT CHANGE UP
PH UR: 6 — SIGNIFICANT CHANGE UP (ref 5–8)
PROT UR-MCNC: NEGATIVE MG/DL — SIGNIFICANT CHANGE UP
SP GR SPEC: 1.01 — SIGNIFICANT CHANGE UP (ref 1.01–1.03)
UROBILINOGEN FLD QL: 0.2 MG/DL — SIGNIFICANT CHANGE UP (ref 0.2–0.2)
WBC UR QL: SIGNIFICANT CHANGE UP /HPF

## 2019-04-21 PROCEDURE — 93970 EXTREMITY STUDY: CPT | Mod: 26

## 2019-04-21 RX ORDER — KETOROLAC TROMETHAMINE 30 MG/ML
25 SYRINGE (ML) INJECTION ONCE
Qty: 0 | Refills: 0 | Status: DISCONTINUED | OUTPATIENT
Start: 2019-04-21 | End: 2019-04-21

## 2019-04-21 RX ADMIN — Medication 1 TABLET(S): at 12:21

## 2019-04-21 RX ADMIN — CHLORHEXIDINE GLUCONATE 1 APPLICATION(S): 213 SOLUTION TOPICAL at 05:14

## 2019-04-21 RX ADMIN — Medication 5 MILLIGRAM(S): at 05:16

## 2019-04-21 RX ADMIN — Medication 1200 MILLIGRAM(S): at 17:02

## 2019-04-21 RX ADMIN — Medication 25 MILLIGRAM(S): at 11:25

## 2019-04-21 RX ADMIN — CHOLESTYRAMINE 4 GRAM(S): 4 POWDER, FOR SUSPENSION ORAL at 09:51

## 2019-04-21 RX ADMIN — Medication 25 MILLIGRAM(S): at 05:15

## 2019-04-21 RX ADMIN — Medication 25 MILLIGRAM(S): at 11:55

## 2019-04-21 RX ADMIN — ENOXAPARIN SODIUM 40 MILLIGRAM(S): 100 INJECTION SUBCUTANEOUS at 12:21

## 2019-04-21 RX ADMIN — LOSARTAN POTASSIUM 12.5 MILLIGRAM(S): 100 TABLET, FILM COATED ORAL at 05:15

## 2019-04-21 RX ADMIN — TAMSULOSIN HYDROCHLORIDE 0.4 MILLIGRAM(S): 0.4 CAPSULE ORAL at 21:25

## 2019-04-21 RX ADMIN — Medication 100 MILLIGRAM(S): at 21:25

## 2019-04-21 RX ADMIN — Medication 40 MILLIGRAM(S): at 05:15

## 2019-04-21 RX ADMIN — Medication 100 MILLIGRAM(S): at 15:36

## 2019-04-21 RX ADMIN — Medication 1200 MILLIGRAM(S): at 05:15

## 2019-04-21 RX ADMIN — SENNA PLUS 2 TABLET(S): 8.6 TABLET ORAL at 21:25

## 2019-04-21 NOTE — PHARMACOTHERAPY INTERVENTION NOTE - COMMENTS
25 milligrams IV push once. Doctor Sanders was contacted to verify the dose, since Toradol was given either 15 mg or 30 mg. Doctor Sanders insisted on 25 mg as per his attending' s instruction.
As per patient takes cozaar 12.5 mg at home therfore continue therapy
I spoke with Dr Evans (spectra [phone]) and is aware losartan 12.5 mg liquid is non-formulary will consider ordering an alternative

## 2019-04-21 NOTE — PROGRESS NOTE ADULT - SUBJECTIVE AND OBJECTIVE BOX
SHELLEY PETIT  87y  Female      Patient is a 87y old  Female who presents with a chief complaint of agitation, AMS at night, uncomplicated cystitis (20 Apr 2019 12:21)      INTERVAL HPI/OVERNIGHT EVENTS:      ******************************* REVIEW OF SYSTEMS:**********************************************    Confused at this time. C/O pain over legs.  All other review of systems negative    *********************** VITALS ******************************************    T(F): 96 (04-21-19 @ 10:41)  HR: 59 (04-21-19 @ 10:41) (59 - 84)  BP: 141/64 (04-21-19 @ 10:41) (83/46 - 141/82)  RR: 19 (04-21-19 @ 10:41) (18 - 19)  SpO2: 99% (04-20-19 @ 20:49) (99% - 99%)    04-20-19 @ 07:01  -  04-21-19 @ 07:00  --------------------------------------------------------  IN: 0 mL / OUT: 600 mL / NET: -600 mL            04-20-19 @ 07:01  -  04-21-19 @ 07:00  --------------------------------------------------------  IN: 0 mL / OUT: 600 mL / NET: -600 mL        ******************************** PHYSICAL EXAM:**************************************************  GENERAL: NAD    PSYCH: no agitation, but confused today  HEENT:     NERVOUS SYSTEM:  Alert & Oriented X1-2, More confused today./    PULMONARY: NILES, CTA    CARDIOVASCULAR: S1S2 RRR    GI: Soft, NT, ND; BS present.    EXTREMITIES:  2+ Peripheral  edema B/L    LYMPH: No lymphadenopathy noted    SKIN: No rashes or lesions    ******************************************************************************************    Consultant(s) Notes Reviewed:  [x ] YES  [ ] NO    Discussed with Consultants/Other Providers [ x] YES     **************************** LABS *******************************************************                          11.7   7.48  )-----------( 248      ( 19 Apr 2019 11:49 )             36.3     04-19    143  |  104  |  36<H>  ----------------------------<  118<H>  4.3   |  23  |  1.2    Ca    8.5      19 Apr 2019 11:49            Lactate Trend        CAPILLARY BLOOD GLUCOSE              **************************Active Medications *******************************************  No Known Allergies      acetaminophen    Suspension .. 650 milliGRAM(s) Oral every 6 hours PRN  calcium carbonate 1250 mG  + Vitamin D (OsCal 500 + D) 1 Tablet(s) Oral daily  chlorhexidine 4% Liquid 1 Application(s) Topical <User Schedule>  cholestyramine Powder (Sugar-Free) 4 Gram(s) Oral daily  docusate sodium 100 milliGRAM(s) Oral three times a day  enoxaparin Injectable 40 milliGRAM(s) SubCutaneous every 24 hours  furosemide    Tablet 40 milliGRAM(s) Oral <User Schedule>  furosemide    Tablet 20 milliGRAM(s) Oral <User Schedule>  ketorolac   Injectable 25 milliGRAM(s) IV Push once  lidocaine   Patch 2 Patch Transdermal every 24 hours PRN  losartan 12.5 milliGRAM(s) Oral daily  mesalamine DR Capsule 1200 milliGRAM(s) Oral two times a day  metoprolol succinate ER 25 milliGRAM(s) Oral daily  predniSONE   Tablet 5 milliGRAM(s) Oral daily  QUEtiapine 12.5 milliGRAM(s) Oral daily PRN  senna 2 Tablet(s) Oral at bedtime  tamsulosin 0.4 milliGRAM(s) Oral at bedtime      ***************************************************  RADIOLOGY & ADDITIONAL TESTS:    Imaging Personally Reviewed:  [ ] YES  [ ] NO    HEALTH ISSUES - PROBLEM Dx:

## 2019-04-21 NOTE — PROGRESS NOTE ADULT - ASSESSMENT
88yo F c PMHx of HFrEF, PPM, b/l knee OA, diverticulitis, immune thrombocytopenia (Onc - Dr. Haskins), dementia here with    # Altered mental status  2/2 Metabolic Encephalopathy vs progressive dementia     Improved mentation >>>> Got worsened again today 2/2 ? Urinary retention     will check UA, LE duplex.    #Urinary retention  S/P box  >>  TOV yesterday, passed but retaining again today >> box replaced back..  empiric qhs flomax  check for UA.    #Chronic systolic and diastolic congestive heart failure    Stable.    #Osteoarthritis - pain control , pt rehab.    #Severe aortic stenosis - op cardio eval     #ROMIE on CKD 2 - monitor creatinine baseline  approx 0.8, Adjusted lasix doses.    Stable .    #Vitamin d deficiency oscal d     #Immune throbocytopenia   platelet count stable      #Functional debility 2/2  ?Progressive Dementia/ urinary retention.          Progress Note Handoff    Pending: Improved AMS.    Family discussion: discussed with family at the bedside .    Disposition: Home with [facility] once clinically stable.

## 2019-04-22 VITALS
TEMPERATURE: 96 F | DIASTOLIC BLOOD PRESSURE: 76 MMHG | HEART RATE: 85 BPM | SYSTOLIC BLOOD PRESSURE: 131 MMHG | RESPIRATION RATE: 18 BRPM

## 2019-04-22 LAB
ALBUMIN SERPL ELPH-MCNC: 3.3 G/DL — LOW (ref 3.5–5.2)
ALP SERPL-CCNC: 71 U/L — SIGNIFICANT CHANGE UP (ref 30–115)
ALT FLD-CCNC: 20 U/L — SIGNIFICANT CHANGE UP (ref 0–41)
ANION GAP SERPL CALC-SCNC: 12 MMOL/L — SIGNIFICANT CHANGE UP (ref 7–14)
AST SERPL-CCNC: 19 U/L — SIGNIFICANT CHANGE UP (ref 0–41)
BASOPHILS # BLD AUTO: 0.02 K/UL — SIGNIFICANT CHANGE UP (ref 0–0.2)
BASOPHILS NFR BLD AUTO: 0.4 % — SIGNIFICANT CHANGE UP (ref 0–1)
BILIRUB SERPL-MCNC: 0.4 MG/DL — SIGNIFICANT CHANGE UP (ref 0.2–1.2)
BUN SERPL-MCNC: 46 MG/DL — HIGH (ref 10–20)
CALCIUM SERPL-MCNC: 8.6 MG/DL — SIGNIFICANT CHANGE UP (ref 8.5–10.1)
CHLORIDE SERPL-SCNC: 102 MMOL/L — SIGNIFICANT CHANGE UP (ref 98–110)
CO2 SERPL-SCNC: 26 MMOL/L — SIGNIFICANT CHANGE UP (ref 17–32)
CREAT SERPL-MCNC: 1.1 MG/DL — SIGNIFICANT CHANGE UP (ref 0.7–1.5)
EOSINOPHIL # BLD AUTO: 0.18 K/UL — SIGNIFICANT CHANGE UP (ref 0–0.7)
EOSINOPHIL NFR BLD AUTO: 3.4 % — SIGNIFICANT CHANGE UP (ref 0–8)
GLUCOSE SERPL-MCNC: 94 MG/DL — SIGNIFICANT CHANGE UP (ref 70–99)
HCT VFR BLD CALC: 31.5 % — LOW (ref 37–47)
HGB BLD-MCNC: 9.9 G/DL — LOW (ref 12–16)
IMM GRANULOCYTES NFR BLD AUTO: 0.4 % — HIGH (ref 0.1–0.3)
LYMPHOCYTES # BLD AUTO: 1.68 K/UL — SIGNIFICANT CHANGE UP (ref 1.2–3.4)
LYMPHOCYTES # BLD AUTO: 31.3 % — SIGNIFICANT CHANGE UP (ref 20.5–51.1)
MAGNESIUM SERPL-MCNC: 2 MG/DL — SIGNIFICANT CHANGE UP (ref 1.8–2.4)
MCHC RBC-ENTMCNC: 28 PG — SIGNIFICANT CHANGE UP (ref 27–31)
MCHC RBC-ENTMCNC: 31.4 G/DL — LOW (ref 32–37)
MCV RBC AUTO: 89.2 FL — SIGNIFICANT CHANGE UP (ref 81–99)
MONOCYTES # BLD AUTO: 0.62 K/UL — HIGH (ref 0.1–0.6)
MONOCYTES NFR BLD AUTO: 11.5 % — HIGH (ref 1.7–9.3)
NEUTROPHILS # BLD AUTO: 2.85 K/UL — SIGNIFICANT CHANGE UP (ref 1.4–6.5)
NEUTROPHILS NFR BLD AUTO: 53 % — SIGNIFICANT CHANGE UP (ref 42.2–75.2)
NRBC # BLD: 0 /100 WBCS — SIGNIFICANT CHANGE UP (ref 0–0)
PLATELET # BLD AUTO: 185 K/UL — SIGNIFICANT CHANGE UP (ref 130–400)
POTASSIUM SERPL-MCNC: 4.3 MMOL/L — SIGNIFICANT CHANGE UP (ref 3.5–5)
POTASSIUM SERPL-SCNC: 4.3 MMOL/L — SIGNIFICANT CHANGE UP (ref 3.5–5)
PROT SERPL-MCNC: 5 G/DL — LOW (ref 6–8)
RBC # BLD: 3.53 M/UL — LOW (ref 4.2–5.4)
RBC # FLD: 12.8 % — SIGNIFICANT CHANGE UP (ref 11.5–14.5)
SODIUM SERPL-SCNC: 140 MMOL/L — SIGNIFICANT CHANGE UP (ref 135–146)
WBC # BLD: 5.37 K/UL — SIGNIFICANT CHANGE UP (ref 4.8–10.8)
WBC # FLD AUTO: 5.37 K/UL — SIGNIFICANT CHANGE UP (ref 4.8–10.8)

## 2019-04-22 RX ORDER — DIPHENOXYLATE HCL/ATROPINE 2.5-.025MG
1 TABLET ORAL
Qty: 30 | Refills: 0 | OUTPATIENT
Start: 2019-04-22 | End: 2019-05-21

## 2019-04-22 RX ORDER — TAMSULOSIN HYDROCHLORIDE 0.4 MG/1
1 CAPSULE ORAL
Qty: 30 | Refills: 0
Start: 2019-04-22 | End: 2019-05-21

## 2019-04-22 RX ORDER — DIPHENOXYLATE HCL/ATROPINE 2.5-.025MG
1 TABLET ORAL
Qty: 30 | Refills: 0
Start: 2019-04-22 | End: 2019-05-21

## 2019-04-22 RX ORDER — LIDOCAINE 4 G/100G
1 CREAM TOPICAL
Qty: 30 | Refills: 0
Start: 2019-04-22 | End: 2019-05-06

## 2019-04-22 RX ADMIN — Medication 1 TABLET(S): at 11:47

## 2019-04-22 RX ADMIN — Medication 100 MILLIGRAM(S): at 13:07

## 2019-04-22 RX ADMIN — ENOXAPARIN SODIUM 40 MILLIGRAM(S): 100 INJECTION SUBCUTANEOUS at 13:07

## 2019-04-22 RX ADMIN — Medication 20 MILLIGRAM(S): at 05:24

## 2019-04-22 RX ADMIN — Medication 1200 MILLIGRAM(S): at 05:24

## 2019-04-22 RX ADMIN — Medication 100 MILLIGRAM(S): at 05:24

## 2019-04-22 RX ADMIN — LOSARTAN POTASSIUM 12.5 MILLIGRAM(S): 100 TABLET, FILM COATED ORAL at 05:24

## 2019-04-22 RX ADMIN — CHOLESTYRAMINE 4 GRAM(S): 4 POWDER, FOR SUSPENSION ORAL at 11:47

## 2019-04-22 RX ADMIN — LIDOCAINE 2 PATCH: 4 CREAM TOPICAL at 11:47

## 2019-04-22 RX ADMIN — CHLORHEXIDINE GLUCONATE 1 APPLICATION(S): 213 SOLUTION TOPICAL at 05:24

## 2019-04-22 RX ADMIN — Medication 5 MILLIGRAM(S): at 05:25

## 2019-04-22 NOTE — PROGRESS NOTE ADULT - ASSESSMENT
SHELLEY PETIT   87y   Female    MRN#: [id number]         SUBJECTIVE  Patient is a 87y old Female who presents with a chief complaint of agitation, AMS at night, uncomplicated cystitis (2019 12:50)  Currently admitted to medicine with the primary diagnosis of Dementia due to Parkinson's disease with behavioral disturbance    Hospital course :   Patient was admitted for encephalopathy. On admission UA showed 10 - 25 WBCs and urine cultures were negative. CT Head no contrast was done and showed no evidence of acute pathology along with chronic microvascular ischemic changes. The patient was started on linezolid on admission as she has a past history of VRE UTI. A venous duplex of the lower extremities was done and was negative for DVT. The patient was seen by psychiatry : recommended treating underlying medical conition including pain, stopping standing Seroquel because of age and CHF, frequent redirection, Seroquel PRN if redirection fails. Linezolid was subsequently discontinued per ID as urine cultures were negative. ROMIE was noted after admission and Lasix dose was readjusted. Urinary retention was subsequently noted and a Tapia catheter was placed and the patient was started on Flomax qhs. Worsening mentation was noted on , repeat UA was unremarkable and repeat LE duplex performed was negative for DVT. Tapia catheter was removed on  and placed back on  as patient was retaining urine again.   The patient was discharged home with a Tapia with recommendations to follow up as outpatient with a urologist for urodynamic studies     Today is hospital day 6d, and this morning she is calm in her bed, alert, denies any complaints and reports no major events overnight    Present Today:           Tapia Catheter : Yes - urinary retention - Failed TOV           Central Line X          IV Fluids X          Drains X      OBJECTIVE  ------------------------------------    PAST MEDICAL & SURGICAL HISTORY  Intestinal perforation  Diverticulitis  Ejection fraction < 50%  Pacemaker  CHF (congestive heart failure): HFrEF  Dementia  Cardiomyopathy  Immune thrombocytopenia  History of intestine removal  History of cholecystectomy  Artificial cardiac pacemaker      ALLERGIES:  No Known Allergies      MEDICATIONS:  STANDING MEDICATIONS  calcium carbonate 1250 mG  + Vitamin D (OsCal 500 + D) 1 Tablet(s) Oral daily  chlorhexidine 4% Liquid 1 Application(s) Topical <User Schedule>  cholestyramine Powder (Sugar-Free) 4 Gram(s) Oral daily  docusate sodium 100 milliGRAM(s) Oral three times a day  enoxaparin Injectable 40 milliGRAM(s) SubCutaneous every 24 hours  furosemide    Tablet 40 milliGRAM(s) Oral <User Schedule>  furosemide    Tablet 20 milliGRAM(s) Oral <User Schedule>  losartan 12.5 milliGRAM(s) Oral daily  mesalamine DR Capsule 1200 milliGRAM(s) Oral two times a day  metoprolol succinate ER 25 milliGRAM(s) Oral daily  predniSONE   Tablet 5 milliGRAM(s) Oral daily  senna 2 Tablet(s) Oral at bedtime  tamsulosin 0.4 milliGRAM(s) Oral at bedtime    PRN MEDICATIONS  acetaminophen    Suspension .. 650 milliGRAM(s) Oral every 6 hours PRN  lidocaine   Patch 2 Patch Transdermal every 24 hours PRN  QUEtiapine 12.5 milliGRAM(s) Oral daily PRN        LABS:                        9.9    5.37  )-----------( 185      ( 2019 05:48 )             31.5     04-    140  |  102  |  46<H>  ----------------------------<  94  4.3   |  26  |  1.1    Ca    8.6      2019 05:48  Mg     2.0         TPro  5.0<L>  /  Alb  3.3<L>  /  TBili  0.4  /  DBili  x   /  AST  19  /  ALT  20  /  AlkPhos  71  -      Urinalysis Basic - ( 2019 13:00 )    Color: Yellow / Appearance: Cloudy / S.010 / pH: x  Gluc: x / Ketone: Negative  / Bili: Negative / Urobili: 0.2 mg/dL   Blood: x / Protein: Negative mg/dL / Nitrite: Negative   Leuk Esterase: Trace / RBC: x / WBC 1-2 /HPF   Sq Epi: x / Non Sq Epi: Occasional /HPF / Bacteria: Few /HPF    RADIOLOGY:    No imaging studies performed today    PHYSICAL EXAM:  VITAL SIGNS: Last 24 Hours  T(C): 35.8 (2019 13:30), Max: 36.1 (2019 06:15)  T(F): 96.5 (2019 13:30), Max: 97 (2019 06:15)  HR: 85 (2019 13:30) (60 - 85)  BP: 131/76 (2019 13:30) (120/60 - 131/76)  BP(mean): --  RR: 18 (2019 13:30) (18 - 18)  SpO2: --    GENERAL: No acute distress, alert  PULMONARY: Clear to auscultation bilaterally; No wheeze crackles or rhonchi, no respiratory distress   CARDIOVASCULAR: Regular rate and rhythm  GASTROINTESTINAL: Soft, Nontender, Nondistended; Bowel sounds present  MUSCULOSKELETAL:  + Peripheral Pulses, Bilateral lower extremity edema, tenderness to palpation       ASSESSMENT & PLAN    87/F, from home with , uses walker/Cane, has hx of HFrEF, PPM, b/l knee OA, diverticulitis, immune thrombocytopenia , dementia (A+O x1 - person) presented to ED c/o being more forgetful and agitated.    #) Urinary retention  - Tapia placed again yesterday as patient was retaining urine   - Discharged home with Tapia  - Outpatient urology follow up for urodynamic studies   - Flomax continued on discharge     #) Acute Delirium with underlying Dementia; toxic metabolic encephalopathy vs progressive dementia   - Worsening of mental status noted on  : Repeat UA negative - Repeat [facility] duplex negative for DVT   - Improved today       #) HFr EF; stable   - Lasix 40/20 every other day alternating     #) Immune thrombocytopenia; stable  -platelets stable    #) HTN; stable   -on losartan , metoprolol    #) Diverticulitis/ colitis; stable   - on mesalamine and chronic steroids 5 mg daily  - calcium and Vitamin D supplementation started for Vitamin D deficiency     # DVT prophylaxis : Lovenox 40 daily  # GI prophylaxis : X  # Activity : Out of bed with assistance   # Diet : DASH TLC   # Code : FULL  # Disposition : Discharged home with home services today

## 2019-04-22 NOTE — PROGRESS NOTE ADULT - ASSESSMENT
88yo F c PMHx of HFrEF, PPM, b/l knee OA, diverticulitis, immune thrombocytopenia (Onc - Dr. Haskins), dementia here with    # Altered mental status  2/2 Metabolic Encephalopathy vs progressive dementia     Improved mentation significantly after putting the box back in and relieve  of urinary retention.    UA >>WNL    #Urinary retention  S/P box  >>  TOV yesterday, passed but retaining again  >> box replaced back..  empiric qhs flomax      #Chronic systolic and diastolic congestive heart failure    Stable.    #Osteoarthritis - pain control , pt rehab.    #Severe aortic stenosis - op cardio eval     #ROMIE on CKD 2 - monitor creatinine baseline  approx 0.8, Adjusted lasix doses.    Stable .    #Vitamin d deficiency oscal d     #Immune throbocytopenia   platelet count stable      #Functional debility 2/2  ?Progressive Dementia/ urinary retention.          Progress Note Handoff    Pending: Improved AMS.    Family discussion: discussed with family at the bedside .    Disposition: Home with [facility] today.

## 2019-04-22 NOTE — PROGRESS NOTE ADULT - REASON FOR ADMISSION
agitation, AMS at night, uncomplicated cystitis

## 2019-04-22 NOTE — PROGRESS NOTE ADULT - PROVIDER SPECIALTY LIST ADULT
Hospitalist
Internal Medicine
Infectious Disease
Hospitalist

## 2019-04-22 NOTE — PROGRESS NOTE ADULT - SUBJECTIVE AND OBJECTIVE BOX
SHELLEY PETIT  87y  Female      Patient is a 87y old  Female who presents with a chief complaint of agitation, AMS at night, uncomplicated cystitis (2019 11:12)      INTERVAL HPI/OVERNIGHT EVENTS:      ******************************* REVIEW OF SYSTEMS:**********************************************  Feels much better, comfortable.  All other review of systems negative    *********************** VITALS ******************************************    T(F): 97 (19 @ 06:15)  HR: 60 (19 @ 06:15) (60 - 60)  BP: 120/60 (19 @ 06:15) (120/60 - 125/58)  RR: 18 (19 @ 06:15) (18 - 18)  SpO2: --    19 @ 07:01  -  19 @ 07:00  --------------------------------------------------------  IN: 0 mL / OUT: 1375 mL / NET: -1375 mL            19 @ 07:01  -  19 @ 07:00  --------------------------------------------------------  IN: 0 mL / OUT: 1375 mL / NET: -1375 mL        ******************************** PHYSICAL EXAM:**************************************************  GENERAL: NAD    PSYCH: no agitation, baseline mentation  HEENT:     NERVOUS SYSTEM:  Alert & Oriented X2-3,     PULMONARY: NILES, CTA    CARDIOVASCULAR: S1S2 RRR    GI: Soft, NT, ND; BS present. Tapia in place.    EXTREMITIES:  2+ Peripheral Pulses, No clubbing, cyanosis, or edema    LYMPH: No lymphadenopathy noted    SKIN: No rashes or lesions    ******************************************************************************************    Consultant(s) Notes Reviewed:  [x ] YES  [ ] NO    Discussed with Consultants/Other Providers [ x] YES     **************************** LABS *******************************************************                          9.9    5.37  )-----------( 185      ( 2019 05:48 )             31.5         140  |  102  |  46<H>  ----------------------------<  94  4.3   |  26  |  1.1    Ca    8.6      2019 05:48  Mg     2.0         TPro  5.0<L>  /  Alb  3.3<L>  /  TBili  0.4  /  DBili  x   /  AST  19  /  ALT  20  /  AlkPhos  71        Urinalysis Basic - ( 2019 13:00 )    Color: Yellow / Appearance: Cloudy / S.010 / pH: x  Gluc: x / Ketone: Negative  / Bili: Negative / Urobili: 0.2 mg/dL   Blood: x / Protein: Negative mg/dL / Nitrite: Negative   Leuk Esterase: Trace / RBC: x / WBC 1-2 /HPF   Sq Epi: x / Non Sq Epi: Occasional /HPF / Bacteria: Few /HPF        Lactate Trend        CAPILLARY BLOOD GLUCOSE              **************************Active Medications *******************************************  No Known Allergies      acetaminophen    Suspension .. 650 milliGRAM(s) Oral every 6 hours PRN  calcium carbonate 1250 mG  + Vitamin D (OsCal 500 + D) 1 Tablet(s) Oral daily  chlorhexidine 4% Liquid 1 Application(s) Topical <User Schedule>  cholestyramine Powder (Sugar-Free) 4 Gram(s) Oral daily  docusate sodium 100 milliGRAM(s) Oral three times a day  enoxaparin Injectable 40 milliGRAM(s) SubCutaneous every 24 hours  furosemide    Tablet 40 milliGRAM(s) Oral <User Schedule>  furosemide    Tablet 20 milliGRAM(s) Oral <User Schedule>  lidocaine   Patch 2 Patch Transdermal every 24 hours PRN  losartan 12.5 milliGRAM(s) Oral daily  mesalamine DR Capsule 1200 milliGRAM(s) Oral two times a day  metoprolol succinate ER 25 milliGRAM(s) Oral daily  predniSONE   Tablet 5 milliGRAM(s) Oral daily  QUEtiapine 12.5 milliGRAM(s) Oral daily PRN  senna 2 Tablet(s) Oral at bedtime  tamsulosin 0.4 milliGRAM(s) Oral at bedtime      ***************************************************  RADIOLOGY & ADDITIONAL TESTS:    Imaging Personally Reviewed:  [ ] YES  [ ] NO    HEALTH ISSUES - PROBLEM Dx:

## 2019-04-29 DIAGNOSIS — T38.0X5A ADVERSE EFFECT OF GLUCOCORTICOIDS AND SYNTHETIC ANALOGUES, INITIAL ENCOUNTER: ICD-10-CM

## 2019-04-29 DIAGNOSIS — G20 PARKINSON'S DISEASE: ICD-10-CM

## 2019-04-29 DIAGNOSIS — R26.9 UNSPECIFIED ABNORMALITIES OF GAIT AND MOBILITY: ICD-10-CM

## 2019-04-29 DIAGNOSIS — Z90.49 ACQUIRED ABSENCE OF OTHER SPECIFIED PARTS OF DIGESTIVE TRACT: ICD-10-CM

## 2019-04-29 DIAGNOSIS — I87.2 VENOUS INSUFFICIENCY (CHRONIC) (PERIPHERAL): ICD-10-CM

## 2019-04-29 DIAGNOSIS — M85.862 OTHER SPECIFIED DISORDERS OF BONE DENSITY AND STRUCTURE, LEFT LOWER LEG: ICD-10-CM

## 2019-04-29 DIAGNOSIS — D69.3 IMMUNE THROMBOCYTOPENIC PURPURA: ICD-10-CM

## 2019-04-29 DIAGNOSIS — M85.861 OTHER SPECIFIED DISORDERS OF BONE DENSITY AND STRUCTURE, RIGHT LOWER LEG: ICD-10-CM

## 2019-04-29 DIAGNOSIS — E55.9 VITAMIN D DEFICIENCY, UNSPECIFIED: ICD-10-CM

## 2019-04-29 DIAGNOSIS — N18.3 CHRONIC KIDNEY DISEASE, STAGE 3 (MODERATE): ICD-10-CM

## 2019-04-29 DIAGNOSIS — N39.0 URINARY TRACT INFECTION, SITE NOT SPECIFIED: ICD-10-CM

## 2019-04-29 DIAGNOSIS — I08.3 COMBINED RHEUMATIC DISORDERS OF MITRAL, AORTIC AND TRICUSPID VALVES: ICD-10-CM

## 2019-04-29 DIAGNOSIS — I42.9 CARDIOMYOPATHY, UNSPECIFIED: ICD-10-CM

## 2019-04-29 DIAGNOSIS — M85.89 OTHER SPECIFIED DISORDERS OF BONE DENSITY AND STRUCTURE, MULTIPLE SITES: ICD-10-CM

## 2019-04-29 DIAGNOSIS — Z95.0 PRESENCE OF CARDIAC PACEMAKER: ICD-10-CM

## 2019-04-29 DIAGNOSIS — M17.0 BILATERAL PRIMARY OSTEOARTHRITIS OF KNEE: ICD-10-CM

## 2019-04-29 DIAGNOSIS — I50.42 CHRONIC COMBINED SYSTOLIC (CONGESTIVE) AND DIASTOLIC (CONGESTIVE) HEART FAILURE: ICD-10-CM

## 2019-04-29 DIAGNOSIS — R33.8 OTHER RETENTION OF URINE: ICD-10-CM

## 2019-04-29 DIAGNOSIS — D64.9 ANEMIA, UNSPECIFIED: ICD-10-CM

## 2019-04-29 DIAGNOSIS — F02.81 DEMENTIA IN OTHER DISEASES CLASSIFIED ELSEWHERE, UNSPECIFIED SEVERITY, WITH BEHAVIORAL DISTURBANCE: ICD-10-CM

## 2019-04-29 PROBLEM — R94.30 ABNORMAL RESULT OF CARDIOVASCULAR FUNCTION STUDY, UNSPECIFIED: Chronic | Status: ACTIVE | Noted: 2019-04-16

## 2019-04-29 PROBLEM — K63.1 PERFORATION OF INTESTINE (NONTRAUMATIC): Chronic | Status: ACTIVE | Noted: 2019-04-16

## 2019-04-29 PROBLEM — K57.92 DIVERTICULITIS OF INTESTINE, PART UNSPECIFIED, WITHOUT PERFORATION OR ABSCESS WITHOUT BLEEDING: Chronic | Status: ACTIVE | Noted: 2019-04-16

## 2019-05-01 ENCOUNTER — APPOINTMENT (OUTPATIENT)
Dept: UROLOGY | Facility: CLINIC | Age: 84
End: 2019-05-01
Payer: MEDICARE

## 2019-05-01 PROCEDURE — 99204 OFFICE O/P NEW MOD 45 MIN: CPT

## 2019-05-01 NOTE — PHYSICAL EXAM
[Normal Appearance] : normal appearance [General Appearance - Well Nourished] : well nourished [Edema] : no peripheral edema [General Appearance - In No Acute Distress] : no acute distress [Respiration, Rhythm And Depth] : normal respiratory rhythm and effort [Abdomen Soft] : soft [Exaggerated Use Of Accessory Muscles For Inspiration] : no accessory muscle use [Abdomen Tenderness] : non-tender [Costovertebral Angle Tenderness] : no ~M costovertebral angle tenderness [] : no rash [No Focal Deficits] : no focal deficits [Affect] : the affect was normal [Oriented To Time, Place, And Person] : oriented to person, place, and time [Not Anxious] : not anxious [No Palpable Adenopathy] : no palpable adenopathy [Mood] : the mood was normal [FreeTextEntry1] : pt in a wheelchair [Skin Color & Pigmentation] : normal skin color and pigmentation

## 2019-05-01 NOTE — REASON FOR VISIT
[Initial Visit ___] : [unfilled] is here today for an initial visit  for [unfilled] [Family Member] : family member [Spouse] : spouse

## 2019-05-01 NOTE — ASSESSMENT
[FreeTextEntry1] : 1. neurogenic bladder syndrome with retention --probable\par \par -stop flomax\par -increase liquid intake if no u/o in 6-8 hrs call office or go to the ER\par -monitor u/o\par -rto 4 weeks for bladder scan\par

## 2019-05-01 NOTE — HISTORY OF PRESENT ILLNESS
[Urinary Retention] : urinary retention [FreeTextEntry1] : 87 y.o female here with family\par s/p hospital admission for altered mental status 4/16/19\par found to have urinary retention\par cath placed draining >1000cc urine and pt placed on flomax\par per pts  he noticed she was voiding more frequently in the days before the admission\par has never had incontinence\par nl BM\par ambulates minimally with a rolling walker\par urine cx- neg

## 2019-05-14 ENCOUNTER — APPOINTMENT (OUTPATIENT)
Dept: HEMATOLOGY ONCOLOGY | Facility: CLINIC | Age: 84
End: 2019-05-14

## 2019-06-11 ENCOUNTER — APPOINTMENT (OUTPATIENT)
Dept: UROLOGY | Facility: CLINIC | Age: 84
End: 2019-06-11
Payer: MEDICARE

## 2019-06-11 PROCEDURE — 99213 OFFICE O/P EST LOW 20 MIN: CPT

## 2019-06-11 PROCEDURE — 51798 US URINE CAPACITY MEASURE: CPT

## 2019-06-11 NOTE — PHYSICAL EXAM
[General Appearance - Well Developed] : well developed [Well Groomed] : well groomed [Normal Appearance] : normal appearance [General Appearance - Well Nourished] : well nourished [General Appearance - In No Acute Distress] : no acute distress [Abdomen Soft] : soft [Abdomen Tenderness] : non-tender [Costovertebral Angle Tenderness] : no ~M costovertebral angle tenderness [Skin Color & Pigmentation] : normal skin color and pigmentation [Edema] : no peripheral edema [Respiration, Rhythm And Depth] : normal respiratory rhythm and effort [] : no respiratory distress [Exaggerated Use Of Accessory Muscles For Inspiration] : no accessory muscle use [Affect] : the affect was normal [Oriented To Time, Place, And Person] : oriented to person, place, and time [Mood] : the mood was normal [Not Anxious] : not anxious [No Focal Deficits] : no focal deficits [No Palpable Adenopathy] : no palpable adenopathy [FreeTextEntry1] : uses  WC

## 2019-06-11 NOTE — ASSESSMENT
[FreeTextEntry1] : #1. Urinary retention--resolved\par \par Plan\par Maintain activity as much as possible\par Return p.r.n..

## 2019-06-11 NOTE — HISTORY OF PRESENT ILLNESS
[FreeTextEntry1] : 87 year-old female here with family as followup for urinary retention. Both patient and family members report adequate voiding without difficulty.\par Her appetite is good however her mobility is decreased using a wheelchair.\par Bladder scan= 10 cc [Urinary Frequency] : urinary frequency [Straining] : no straining [Nocturia] : nocturia [Weak Stream] : no weak stream [Fatigue] : no fatigue [Hematuria - Gross] : no gross hematuria [Dysuria] : no dysuria [Anorexia] : no anorexia

## 2019-07-11 ENCOUNTER — APPOINTMENT (OUTPATIENT)
Dept: CARDIOLOGY | Facility: CLINIC | Age: 84
End: 2019-07-11
Payer: MEDICARE

## 2019-07-11 PROCEDURE — 93294 REM INTERROG EVL PM/LDLS PM: CPT

## 2019-07-11 PROCEDURE — 93296 REM INTERROG EVL PM/IDS: CPT

## 2019-07-22 ENCOUNTER — OUTPATIENT (OUTPATIENT)
Dept: OUTPATIENT SERVICES | Facility: HOSPITAL | Age: 84
LOS: 1 days | Discharge: HOME | End: 2019-07-22

## 2019-07-22 DIAGNOSIS — Z90.49 ACQUIRED ABSENCE OF OTHER SPECIFIED PARTS OF DIGESTIVE TRACT: Chronic | ICD-10-CM

## 2019-07-22 DIAGNOSIS — Z95.0 PRESENCE OF CARDIAC PACEMAKER: Chronic | ICD-10-CM

## 2019-07-22 DIAGNOSIS — K52.9 NONINFECTIVE GASTROENTERITIS AND COLITIS, UNSPECIFIED: ICD-10-CM

## 2019-08-19 ENCOUNTER — OUTPATIENT (OUTPATIENT)
Dept: OUTPATIENT SERVICES | Facility: HOSPITAL | Age: 84
LOS: 1 days | Discharge: HOME | End: 2019-08-19

## 2019-08-19 DIAGNOSIS — I11.0 HYPERTENSIVE HEART DISEASE WITH HEART FAILURE: ICD-10-CM

## 2019-08-19 DIAGNOSIS — Z90.49 ACQUIRED ABSENCE OF OTHER SPECIFIED PARTS OF DIGESTIVE TRACT: Chronic | ICD-10-CM

## 2019-08-19 DIAGNOSIS — E87.5 HYPERKALEMIA: ICD-10-CM

## 2019-08-19 DIAGNOSIS — Z95.0 PRESENCE OF CARDIAC PACEMAKER: Chronic | ICD-10-CM

## 2019-08-26 ENCOUNTER — OUTPATIENT (OUTPATIENT)
Dept: OUTPATIENT SERVICES | Facility: HOSPITAL | Age: 84
LOS: 1 days | Discharge: HOME | End: 2019-08-26

## 2019-08-26 DIAGNOSIS — Z95.0 PRESENCE OF CARDIAC PACEMAKER: Chronic | ICD-10-CM

## 2019-08-26 DIAGNOSIS — R79.9 ABNORMAL FINDING OF BLOOD CHEMISTRY, UNSPECIFIED: ICD-10-CM

## 2019-08-26 DIAGNOSIS — Z90.49 ACQUIRED ABSENCE OF OTHER SPECIFIED PARTS OF DIGESTIVE TRACT: Chronic | ICD-10-CM

## 2019-09-03 ENCOUNTER — OUTPATIENT (OUTPATIENT)
Dept: OUTPATIENT SERVICES | Facility: HOSPITAL | Age: 84
LOS: 1 days | Discharge: HOME | End: 2019-09-03

## 2019-09-03 DIAGNOSIS — E87.5 HYPERKALEMIA: ICD-10-CM

## 2019-09-03 DIAGNOSIS — Z90.49 ACQUIRED ABSENCE OF OTHER SPECIFIED PARTS OF DIGESTIVE TRACT: Chronic | ICD-10-CM

## 2019-09-03 DIAGNOSIS — Z95.0 PRESENCE OF CARDIAC PACEMAKER: Chronic | ICD-10-CM

## 2019-09-03 DIAGNOSIS — I11.0 HYPERTENSIVE HEART DISEASE WITH HEART FAILURE: ICD-10-CM

## 2019-09-09 ENCOUNTER — OUTPATIENT (OUTPATIENT)
Dept: OUTPATIENT SERVICES | Facility: HOSPITAL | Age: 84
LOS: 1 days | Discharge: HOME | End: 2019-09-09

## 2019-09-09 DIAGNOSIS — E87.5 HYPERKALEMIA: ICD-10-CM

## 2019-09-09 DIAGNOSIS — Z90.49 ACQUIRED ABSENCE OF OTHER SPECIFIED PARTS OF DIGESTIVE TRACT: Chronic | ICD-10-CM

## 2019-09-09 DIAGNOSIS — Z95.0 PRESENCE OF CARDIAC PACEMAKER: Chronic | ICD-10-CM

## 2019-09-09 DIAGNOSIS — I11.0 HYPERTENSIVE HEART DISEASE WITH HEART FAILURE: ICD-10-CM

## 2019-09-10 ENCOUNTER — OUTPATIENT (OUTPATIENT)
Dept: OUTPATIENT SERVICES | Facility: HOSPITAL | Age: 84
LOS: 1 days | Discharge: HOME | End: 2019-09-10

## 2019-09-10 DIAGNOSIS — Z90.49 ACQUIRED ABSENCE OF OTHER SPECIFIED PARTS OF DIGESTIVE TRACT: Chronic | ICD-10-CM

## 2019-09-10 DIAGNOSIS — Z95.0 PRESENCE OF CARDIAC PACEMAKER: Chronic | ICD-10-CM

## 2019-09-10 DIAGNOSIS — E87.5 HYPERKALEMIA: ICD-10-CM

## 2019-09-16 ENCOUNTER — OUTPATIENT (OUTPATIENT)
Dept: OUTPATIENT SERVICES | Facility: HOSPITAL | Age: 84
LOS: 1 days | Discharge: HOME | End: 2019-09-16

## 2019-09-16 DIAGNOSIS — E87.5 HYPERKALEMIA: ICD-10-CM

## 2019-09-16 DIAGNOSIS — Z90.49 ACQUIRED ABSENCE OF OTHER SPECIFIED PARTS OF DIGESTIVE TRACT: Chronic | ICD-10-CM

## 2019-09-16 DIAGNOSIS — Z95.0 PRESENCE OF CARDIAC PACEMAKER: Chronic | ICD-10-CM

## 2019-09-23 ENCOUNTER — OUTPATIENT (OUTPATIENT)
Dept: OUTPATIENT SERVICES | Facility: HOSPITAL | Age: 84
LOS: 1 days | Discharge: HOME | End: 2019-09-23

## 2019-09-23 DIAGNOSIS — Z95.0 PRESENCE OF CARDIAC PACEMAKER: Chronic | ICD-10-CM

## 2019-09-23 DIAGNOSIS — Z90.49 ACQUIRED ABSENCE OF OTHER SPECIFIED PARTS OF DIGESTIVE TRACT: Chronic | ICD-10-CM

## 2019-09-23 DIAGNOSIS — E87.5 HYPERKALEMIA: ICD-10-CM

## 2019-10-09 ENCOUNTER — APPOINTMENT (OUTPATIENT)
Dept: CARDIOLOGY | Facility: CLINIC | Age: 84
End: 2019-10-09

## 2019-10-28 ENCOUNTER — OUTPATIENT (OUTPATIENT)
Dept: OUTPATIENT SERVICES | Facility: HOSPITAL | Age: 84
LOS: 1 days | Discharge: HOME | End: 2019-10-28

## 2019-10-28 DIAGNOSIS — E87.5 HYPERKALEMIA: ICD-10-CM

## 2019-10-28 DIAGNOSIS — Z90.49 ACQUIRED ABSENCE OF OTHER SPECIFIED PARTS OF DIGESTIVE TRACT: Chronic | ICD-10-CM

## 2019-10-28 DIAGNOSIS — Z95.0 PRESENCE OF CARDIAC PACEMAKER: Chronic | ICD-10-CM

## 2019-11-12 ENCOUNTER — APPOINTMENT (OUTPATIENT)
Dept: CARDIOLOGY | Facility: CLINIC | Age: 84
End: 2019-11-12
Payer: MEDICARE

## 2019-11-12 PROCEDURE — 93296 REM INTERROG EVL PM/IDS: CPT

## 2019-11-12 PROCEDURE — 93294 REM INTERROG EVL PM/LDLS PM: CPT

## 2019-11-25 ENCOUNTER — OUTPATIENT (OUTPATIENT)
Dept: OUTPATIENT SERVICES | Facility: HOSPITAL | Age: 84
LOS: 1 days | Discharge: HOME | End: 2019-11-25

## 2019-11-25 DIAGNOSIS — E87.5 HYPERKALEMIA: ICD-10-CM

## 2019-11-25 DIAGNOSIS — Z90.49 ACQUIRED ABSENCE OF OTHER SPECIFIED PARTS OF DIGESTIVE TRACT: Chronic | ICD-10-CM

## 2019-11-25 DIAGNOSIS — Z95.0 PRESENCE OF CARDIAC PACEMAKER: Chronic | ICD-10-CM

## 2019-12-12 ENCOUNTER — OUTPATIENT (OUTPATIENT)
Dept: OUTPATIENT SERVICES | Facility: HOSPITAL | Age: 84
LOS: 1 days | Discharge: HOME | End: 2019-12-12

## 2019-12-12 ENCOUNTER — OUTPATIENT (OUTPATIENT)
Dept: OUTPATIENT SERVICES | Facility: HOSPITAL | Age: 84
LOS: 1 days | Discharge: HOME | End: 2019-12-12
Payer: MEDICARE

## 2019-12-12 DIAGNOSIS — M25.511 PAIN IN RIGHT SHOULDER: ICD-10-CM

## 2019-12-12 DIAGNOSIS — M75.120 COMPLETE ROTATOR CUFF TEAR OR RUPTURE OF UNSPECIFIED SHOULDER, NOT SPECIFIED AS TRAUMATIC: ICD-10-CM

## 2019-12-12 DIAGNOSIS — Z95.0 PRESENCE OF CARDIAC PACEMAKER: Chronic | ICD-10-CM

## 2019-12-12 DIAGNOSIS — Z90.49 ACQUIRED ABSENCE OF OTHER SPECIFIED PARTS OF DIGESTIVE TRACT: Chronic | ICD-10-CM

## 2019-12-12 DIAGNOSIS — M17.0 BILATERAL PRIMARY OSTEOARTHRITIS OF KNEE: ICD-10-CM

## 2019-12-12 PROCEDURE — 73030 X-RAY EXAM OF SHOULDER: CPT | Mod: 26,RT

## 2019-12-16 DIAGNOSIS — M17.0 BILATERAL PRIMARY OSTEOARTHRITIS OF KNEE: ICD-10-CM

## 2019-12-16 DIAGNOSIS — Z02.9 ENCOUNTER FOR ADMINISTRATIVE EXAMINATIONS, UNSPECIFIED: ICD-10-CM

## 2019-12-16 DIAGNOSIS — M75.120 COMPLETE ROTATOR CUFF TEAR OR RUPTURE OF UNSPECIFIED SHOULDER, NOT SPECIFIED AS TRAUMATIC: ICD-10-CM

## 2019-12-23 ENCOUNTER — OUTPATIENT (OUTPATIENT)
Dept: OUTPATIENT SERVICES | Facility: HOSPITAL | Age: 84
LOS: 1 days | Discharge: HOME | End: 2019-12-23

## 2019-12-23 DIAGNOSIS — Z90.49 ACQUIRED ABSENCE OF OTHER SPECIFIED PARTS OF DIGESTIVE TRACT: Chronic | ICD-10-CM

## 2019-12-23 DIAGNOSIS — E87.5 HYPERKALEMIA: ICD-10-CM

## 2019-12-23 DIAGNOSIS — Z95.0 PRESENCE OF CARDIAC PACEMAKER: Chronic | ICD-10-CM

## 2019-12-27 DIAGNOSIS — M25.511 PAIN IN RIGHT SHOULDER: ICD-10-CM

## 2020-01-22 ENCOUNTER — APPOINTMENT (OUTPATIENT)
Dept: CARDIOLOGY | Facility: CLINIC | Age: 85
End: 2020-01-22

## 2020-01-23 ENCOUNTER — APPOINTMENT (OUTPATIENT)
Dept: UROLOGY | Facility: CLINIC | Age: 85
End: 2020-01-23
Payer: MEDICARE

## 2020-01-23 VITALS — WEIGHT: 135 LBS | HEIGHT: 62 IN | BODY MASS INDEX: 24.84 KG/M2

## 2020-01-23 DIAGNOSIS — R33.9 RETENTION OF URINE, UNSPECIFIED: ICD-10-CM

## 2020-01-23 DIAGNOSIS — R39.9 UNSPECIFIED SYMPTOMS AND SIGNS INVOLVING THE GENITOURINARY SYSTEM: ICD-10-CM

## 2020-01-23 LAB
BILIRUB UR QL STRIP: NORMAL
CLARITY UR: CLEAR
COLLECTION METHOD: NORMAL
GLUCOSE UR-MCNC: NORMAL
HCG UR QL: NORMAL EU/DL
HGB UR QL STRIP.AUTO: NORMAL
KETONES UR-MCNC: NORMAL
LEUKOCYTE ESTERASE UR QL STRIP: NORMAL
NITRITE UR QL STRIP: NORMAL
PH UR STRIP: 5
PROT UR STRIP-MCNC: NORMAL
SP GR UR STRIP: 1.01

## 2020-01-23 PROCEDURE — 81003 URINALYSIS AUTO W/O SCOPE: CPT | Mod: QW

## 2020-01-23 PROCEDURE — 51798 US URINE CAPACITY MEASURE: CPT

## 2020-01-23 PROCEDURE — 99213 OFFICE O/P EST LOW 20 MIN: CPT

## 2020-01-23 RX ORDER — BETHANECHOL CHLORIDE 50 MG/1
50 TABLET ORAL
Qty: 90 | Refills: 5 | Status: ACTIVE | COMMUNITY
Start: 2020-01-23 | End: 1900-01-01

## 2020-02-13 ENCOUNTER — APPOINTMENT (OUTPATIENT)
Dept: CARDIOLOGY | Facility: CLINIC | Age: 85
End: 2020-02-13
Payer: MEDICARE

## 2020-02-13 PROCEDURE — 93296 REM INTERROG EVL PM/IDS: CPT

## 2020-02-13 PROCEDURE — 93294 REM INTERROG EVL PM/LDLS PM: CPT

## 2020-03-03 ENCOUNTER — APPOINTMENT (OUTPATIENT)
Dept: UROLOGY | Facility: CLINIC | Age: 85
End: 2020-03-03
Payer: MEDICARE

## 2020-03-03 DIAGNOSIS — R33.9 RETENTION OF URINE, UNSPECIFIED: ICD-10-CM

## 2020-03-03 PROCEDURE — 99213 OFFICE O/P EST LOW 20 MIN: CPT

## 2020-05-14 ENCOUNTER — APPOINTMENT (OUTPATIENT)
Dept: CARDIOLOGY | Facility: CLINIC | Age: 85
End: 2020-05-14
Payer: MEDICARE

## 2020-05-14 PROCEDURE — 93296 REM INTERROG EVL PM/IDS: CPT

## 2020-05-14 PROCEDURE — 93294 REM INTERROG EVL PM/LDLS PM: CPT

## 2020-06-17 ENCOUNTER — APPOINTMENT (OUTPATIENT)
Dept: CARDIOLOGY | Facility: CLINIC | Age: 85
End: 2020-06-17
Payer: MEDICARE

## 2020-06-17 VITALS
BODY MASS INDEX: 29.45 KG/M2 | SYSTOLIC BLOOD PRESSURE: 117 MMHG | DIASTOLIC BLOOD PRESSURE: 61 MMHG | WEIGHT: 150 LBS | HEIGHT: 60 IN | TEMPERATURE: 97.3 F | HEART RATE: 65 BPM

## 2020-06-17 VITALS
WEIGHT: 150 LBS | SYSTOLIC BLOOD PRESSURE: 117 MMHG | DIASTOLIC BLOOD PRESSURE: 61 MMHG | BODY MASS INDEX: 29.45 KG/M2 | HEART RATE: 65 BPM | HEIGHT: 60 IN | TEMPERATURE: 97.3 F

## 2020-06-17 DIAGNOSIS — Z45.018 ENCOUNTER FOR ADJUSTMENT AND MANAGEMENT OF OTHER PART OF CARDIAC PACEMAKER: ICD-10-CM

## 2020-06-17 DIAGNOSIS — I10 ESSENTIAL (PRIMARY) HYPERTENSION: ICD-10-CM

## 2020-06-17 DIAGNOSIS — I49.5 SICK SINUS SYNDROME: ICD-10-CM

## 2020-06-17 PROCEDURE — 93280 PM DEVICE PROGR EVAL DUAL: CPT

## 2020-06-17 PROCEDURE — 99213 OFFICE O/P EST LOW 20 MIN: CPT

## 2020-06-17 PROCEDURE — 93000 ELECTROCARDIOGRAM COMPLETE: CPT | Mod: 59

## 2020-06-17 RX ORDER — MESALAMINE 375 MG/1
0.38 CAPSULE, EXTENDED RELEASE ORAL TWICE DAILY
Refills: 0 | Status: ACTIVE | COMMUNITY

## 2020-06-17 RX ORDER — SPIRONOLACTONE 25 MG/1
25 TABLET ORAL DAILY
Refills: 0 | Status: ACTIVE | COMMUNITY

## 2020-06-17 RX ORDER — METOLAZONE 2.5 MG/1
2.5 TABLET ORAL
Refills: 0 | Status: ACTIVE | COMMUNITY

## 2020-06-17 RX ORDER — MESALAMINE 375 MG/1
0.38 CAPSULE, EXTENDED RELEASE ORAL
Refills: 0 | Status: COMPLETED | COMMUNITY
End: 2020-06-17

## 2020-06-17 RX ORDER — FUROSEMIDE 40 MG/1
40 TABLET ORAL
Refills: 0 | Status: ACTIVE | COMMUNITY

## 2020-06-17 NOTE — ASSESSMENT
[FreeTextEntry1] : Ms. Earl is an 89 yo F with history of sinus node dysfunction s/p DC PPM (2006) s/p gen change for EOL device on 9/13/2018. She presents today for routine device interrogation. PPM interrogation today shows Normal Dual Chamber PPM function, all parameters stable.  No arrhythmias or events recorded. \par \par She reports compliance with her medications and BP is well controlled today. She is enrolled in remote monitor and is transmitting appropriately. \par \par Follow up in 9-12 months. I have also advised the patient to go to the nearest emergency room if she experiences any chest pain, dyspnea, syncope, or has any other compelling symptoms.

## 2020-06-17 NOTE — HISTORY OF PRESENT ILLNESS
[de-identified] : \par Cardiologist: Dr. Shaikh\par \par 89 yo F with history of severe AS, ITP, diverticulitis, SSS s/p DC PPM (2006) s/p generator replacement on 9/13/2018 d/t EOL . She was also found to have a depressed EF and critical aortic stenosis. She was evaluated for TAVR, but deemed to not be a candidate. \par \par She presents today for routine device interrogation. She has no cardiovascular complaints and has been doing relatively well. She is in a wheelchair (at baseline) and walks minimally due to chronic knee arthritis . \par She denies CP / SOB or palpitations. No dizziness or syncope . \par \par Cardiac tests: \par ECG (4/10/2019) - Atrial paced at 60 bpm, ventricular sensed , QTc 426 ms , non specific TWI .

## 2020-06-17 NOTE — PHYSICAL EXAM
[General Appearance - In No Acute Distress] : no acute distress [Normal Appearance] : normal appearance [Heart Rate And Rhythm] : heart rate and rhythm were normal [Heart Sounds] : normal S1 and S2 [] : no respiratory distress [Respiration, Rhythm And Depth] : normal respiratory rhythm and effort [Exaggerated Use Of Accessory Muscles For Inspiration] : no accessory muscle use [Well-Healed] : well-healed [Auscultation Breath Sounds / Voice Sounds] : lungs were clear to auscultation bilaterally [Left Infraclavicular] : left infraclavicular area [Nail Clubbing] : no clubbing of the fingernails [Abdomen Soft] : soft [FreeTextEntry1] : 2+pitting edema of bilateral lower extremities

## 2020-06-17 NOTE — PROCEDURE
[Voltage: ___ volts] : Voltage was [unfilled] volts [Pacemaker] : pacemaker [Sinus Bradycardia] : sinus bradycardia [Longevity: ___ months] : The estimated remaining battery life is [unfilled] months [Lead Imp:  ___ohms] : lead impedance was [unfilled] ohms [Sensing Amplitude ___mv] : sensing amplitude was [unfilled] mv [___ ms] : [unfilled] ms [___V @] : [unfilled] V [None] : none [Programmed for Longevity] : output reprogrammed for improved battery longevity [Apace-Vpace ___ %] : Apace-Vpace [unfilled]% [Apace-Vsense ___ %] : Apace-Vsense [unfilled]% [de-identified] : NSR 63 bpm [de-identified] : [employer] [de-identified] : Elda TONG [de-identified] : [id number] [de-identified] : AAI=>DDD [de-identified] : 09/13/2018 [de-identified] : 60/120 [de-identified] : A-paced 65.8%\par V-paced < 0.1%\par No events

## 2020-06-23 NOTE — PROGRESS NOTE ADULT - RESPIRATORY
[facility], [place], Emergency Department  [address] [address]  Phone:  [phone]                                    Maryanne Crockett   MRN: [id number]    Department:  [facility], Emergency Department   Date of Visit:  6/23/2020           After Visit Summary Signature Page    I have received my discharge instructions, and my questions have been answered. I have discussed any challenges I see with this plan with the nurse or doctor.    ..........................................................................................................................................  Patient/Patient Representative Signature      ..........................................................................................................................................  Patient Representative Print Name and Relationship to Patient    ..................................................               ................................................  Date                                   Time    ..........................................................................................................................................  Reviewed by Signature/Title    ...................................................              ..............................................  Date                                               Time          22EPIC Rev 08/18       
Breath Sounds equal & clear to percussion & auscultation, no accessory muscle use

## 2020-07-15 NOTE — DISCHARGE NOTE PROVIDER - NSDCCPGOAL_GEN_ALL_CORE_FT
Initial Anesthesia Post-op Note    Patient: Gaby Joselineer  Procedure(s) Performed: ERCP  Anesthesia type: No value filed.    Vitals Value Taken Time   Temp 36.5 7/15/2020  3:35 PM   Pulse 84 7/15/2020  3:35 PM   Resp 16 7/15/2020  3:35 PM   SpO2 36.5 7/15/2020  3:35 PM   /68 7/15/2020  3:35 PM       Last 24 I/O:     Intake/Output Summary (Last 24 hours) at 7/15/2020 1535  Last data filed at 7/15/2020 1535  Gross per 24 hour   Intake 1100 ml   Output 850 ml   Net 250 ml         Patient Location: bedside  Post-op Vital Signs:stable  Level of Consciousness: awake and alert  Respiratory Status: spontaneous ventilation  Cardiovascular stable  Hydration: euvolemic  Pain Management: adequately controlled  Handoff: Handoff to receiving nurse was performed and questions were answered  Nausea: None  Airway Patency:patent  Post-op Assessment: no complications and patient tolerated procedure well with no complications     To get better and follow your care plan as instructed.

## 2020-08-13 ENCOUNTER — APPOINTMENT (OUTPATIENT)
Dept: CARDIOLOGY | Facility: CLINIC | Age: 85
End: 2020-08-13
Payer: MEDICARE

## 2020-08-13 PROCEDURE — 93294 REM INTERROG EVL PM/LDLS PM: CPT

## 2020-08-13 PROCEDURE — 93296 REM INTERROG EVL PM/IDS: CPT

## 2020-08-24 NOTE — H&P ADULT - NSHPPOAPRESSUREULCER_GEN_ALL_CORE
NO PROTOCOL    Clonazepam 0.5 Mg Tab Teva  TAKE 1 TABLET BY MOUTH EVERY MORNING AND 1 TABLET EVERY AFTERNOON. DO NOT TAKE AT THE SAME TIME AS OPIOID PAIN MEDICATION     Seen: 6-    Renewed: 7- was given #60 w/ 0 refill    Please E-scribe if approve?      
no

## 2020-09-28 ENCOUNTER — APPOINTMENT (OUTPATIENT)
Dept: NEUROLOGY | Facility: CLINIC | Age: 85
End: 2020-09-28
Payer: MEDICARE

## 2020-09-28 VITALS
HEIGHT: 60 IN | HEART RATE: 66 BPM | TEMPERATURE: 96.3 F | BODY MASS INDEX: 28.66 KG/M2 | SYSTOLIC BLOOD PRESSURE: 106 MMHG | WEIGHT: 146 LBS | OXYGEN SATURATION: 95 % | DIASTOLIC BLOOD PRESSURE: 66 MMHG

## 2020-09-28 DIAGNOSIS — G30.9 ALZHEIMER'S DISEASE, UNSPECIFIED: ICD-10-CM

## 2020-09-28 DIAGNOSIS — F02.80 ALZHEIMER'S DISEASE, UNSPECIFIED: ICD-10-CM

## 2020-09-28 PROCEDURE — 99203 OFFICE O/P NEW LOW 30 MIN: CPT

## 2020-09-28 RX ORDER — METOPROLOL SUCCINATE 25 MG/1
25 TABLET, EXTENDED RELEASE ORAL DAILY
Refills: 0 | Status: DISCONTINUED | COMMUNITY
End: 2020-09-28

## 2020-09-28 RX ORDER — METOPROLOL TARTRATE 25 MG/1
25 TABLET, FILM COATED ORAL DAILY
Refills: 0 | Status: ACTIVE | COMMUNITY

## 2020-09-28 RX ORDER — DIPHENOXYLATE HYDROCHLORIDE AND ATROPINE SULFATE 2.5; .025 MG/1; MG/1
2.5-0.025 TABLET ORAL DAILY
Refills: 0 | Status: DISCONTINUED | COMMUNITY
End: 2020-09-28

## 2020-09-28 RX ORDER — METOLAZONE 2.5 MG/1
2.5 TABLET ORAL DAILY
Refills: 0 | Status: ACTIVE | COMMUNITY

## 2020-09-28 RX ORDER — SPIRONOLACTONE 25 MG/1
25 TABLET ORAL
Refills: 0 | Status: ACTIVE | COMMUNITY

## 2020-09-28 RX ORDER — PREDNISONE 5 MG/1
5 TABLET ORAL DAILY
Refills: 0 | Status: ACTIVE | COMMUNITY

## 2020-09-28 RX ORDER — CHOLESTYRAMINE 4 G/9G
4 POWDER, FOR SUSPENSION ORAL
Qty: 60 | Refills: 0 | Status: ACTIVE | COMMUNITY
Start: 2020-08-28

## 2020-09-28 RX ORDER — CHOLESTYRAMINE
POWDER (GRAM) MISCELLANEOUS
Refills: 0 | Status: DISCONTINUED | COMMUNITY
End: 2020-09-28

## 2020-09-28 RX ORDER — LOSARTAN POTASSIUM 100 MG/1
TABLET, FILM COATED ORAL DAILY
Refills: 0 | Status: DISCONTINUED | COMMUNITY
End: 2020-09-28

## 2020-09-28 RX ORDER — LOSARTAN POTASSIUM 25 MG/1
25 TABLET, FILM COATED ORAL DAILY
Refills: 0 | Status: ACTIVE | COMMUNITY

## 2020-09-28 RX ORDER — PREDNISONE 5 MG/1
5 TABLET ORAL
Refills: 0 | Status: DISCONTINUED | COMMUNITY
End: 2020-09-28

## 2020-09-28 NOTE — PHYSICAL EXAM
[FreeTextEntry1] : Alert oriented x2, doesn’t know date; Unable to perform MOCA test\par CN 2-12 normal\par no drift in UE and power 5/5 in UE\par RLE proximally 4/5 and LLE proximally 4+/5\par edema in b/l LE above knee\par DTR absent throughout\par FTN NL\par Gait deferred\par

## 2020-09-28 NOTE — DATA REVIEWED
[de-identified] : reviewed images from [facility] form 2019 and notes from cardiology (EPS)

## 2020-09-28 NOTE — DISCUSSION/SUMMARY
[FreeTextEntry1] : Ms. Earl is a 87yo woman with mod-severe dementia not on any medications.  Given her cardiac history and severity of dementia starting aricept or namenda offer little benefit.  Would work on behavior control and sleep\par 1. Trial seroquel 12.5mg QHS\par 2. Encourage exercise\par 3. /u in 4-6 months

## 2020-09-28 NOTE — REVIEW OF SYSTEMS
[As Noted in HPI] : as noted in HPI [Abdominal Pain] : abdominal pain [Incontinence] : incontinence [Joint Pain] : joint pain [Negative] : Heme/Lymph

## 2020-09-28 NOTE — HISTORY OF PRESENT ILLNESS
[FreeTextEntry1] : Ms. Earl is a 87yo woman with pmhx below here for evaluation of dementia.  She began having memory issues about 6 years ago and it was very mild.  About 2 years ago it was noticeably worse and she began needing assistance with some of her ADLs.  Now she needs assistance with all her ADLs.  She gets confused in evenings frequently and sometimes during the day.  She needs to be dressed but can still feed herself.  She sometimes forgets her  who takes care of her and frequently asks to go home.  She has severe CHF and is followed closely by Dr. Shaikh her cardiologist\par She has never been diagnosed or started on meds for dementia.  Last CTH was in 2019 and was essential unremarkable

## 2020-10-19 RX ORDER — QUETIAPINE FUMARATE 25 MG/1
25 TABLET ORAL TWICE DAILY
Qty: 60 | Refills: 4 | Status: ACTIVE | COMMUNITY
Start: 2020-09-28 | End: 1900-01-01

## 2020-11-12 ENCOUNTER — APPOINTMENT (OUTPATIENT)
Dept: CARDIOLOGY | Facility: CLINIC | Age: 85
End: 2020-11-12
Payer: MEDICARE

## 2020-11-12 PROCEDURE — 93294 REM INTERROG EVL PM/LDLS PM: CPT

## 2020-11-12 PROCEDURE — 93296 REM INTERROG EVL PM/IDS: CPT

## 2020-12-31 PROBLEM — G30.9 ALZHEIMER'S DEMENTIA: Status: ACTIVE | Noted: 2020-09-28

## 2021-02-11 ENCOUNTER — APPOINTMENT (OUTPATIENT)
Dept: CARDIOLOGY | Facility: CLINIC | Age: 86
End: 2021-02-11

## 2021-03-17 ENCOUNTER — APPOINTMENT (OUTPATIENT)
Dept: CARDIOLOGY | Facility: CLINIC | Age: 86
End: 2021-03-17

## 2021-05-05 ENCOUNTER — INPATIENT (INPATIENT)
Facility: HOSPITAL | Age: 86
LOS: 4 days | Discharge: NURSING, REHAB OR LONG-TERM CARE | End: 2021-05-10
Attending: INTERNAL MEDICINE | Admitting: INTERNAL MEDICINE
Payer: OTHER MISCELLANEOUS

## 2021-05-05 VITALS
OXYGEN SATURATION: 98 % | RESPIRATION RATE: 18 BRPM | TEMPERATURE: 98 F | DIASTOLIC BLOOD PRESSURE: 57 MMHG | HEART RATE: 60 BPM | SYSTOLIC BLOOD PRESSURE: 129 MMHG | HEIGHT: 62 IN

## 2021-05-05 DIAGNOSIS — Z90.49 ACQUIRED ABSENCE OF OTHER SPECIFIED PARTS OF DIGESTIVE TRACT: Chronic | ICD-10-CM

## 2021-05-05 DIAGNOSIS — Z95.0 PRESENCE OF CARDIAC PACEMAKER: Chronic | ICD-10-CM

## 2021-05-05 LAB
RAPID RVP RESULT: SIGNIFICANT CHANGE UP
SARS-COV-2 RNA SPEC QL NAA+PROBE: SIGNIFICANT CHANGE UP

## 2021-05-05 PROCEDURE — 99285 EMERGENCY DEPT VISIT HI MDM: CPT

## 2021-05-05 PROCEDURE — 99222 1ST HOSP IP/OBS MODERATE 55: CPT

## 2021-05-05 RX ORDER — QUETIAPINE FUMARATE 200 MG/1
50 TABLET, FILM COATED ORAL ONCE
Refills: 0 | Status: COMPLETED | OUTPATIENT
Start: 2021-05-05 | End: 2021-05-05

## 2021-05-05 RX ORDER — MORPHINE SULFATE 50 MG/1
0 CAPSULE, EXTENDED RELEASE ORAL
Qty: 0 | Refills: 0 | DISCHARGE

## 2021-05-05 RX ORDER — MORPHINE SULFATE 50 MG/1
5 CAPSULE, EXTENDED RELEASE ORAL
Refills: 0 | Status: DISCONTINUED | OUTPATIENT
Start: 2021-05-05 | End: 2021-05-10

## 2021-05-05 RX ORDER — FUROSEMIDE 40 MG
0.5 TABLET ORAL
Qty: 0 | Refills: 0 | DISCHARGE

## 2021-05-05 RX ORDER — METOPROLOL TARTRATE 50 MG
25 TABLET ORAL DAILY
Refills: 0 | Status: DISCONTINUED | OUTPATIENT
Start: 2021-05-05 | End: 2021-05-05

## 2021-05-05 RX ORDER — MESALAMINE 400 MG
1.2 TABLET, DELAYED RELEASE (ENTERIC COATED) ORAL
Qty: 0 | Refills: 0 | DISCHARGE

## 2021-05-05 RX ORDER — FUROSEMIDE 40 MG
1 TABLET ORAL
Qty: 0 | Refills: 0 | DISCHARGE

## 2021-05-05 RX ORDER — FUROSEMIDE 40 MG
40 TABLET ORAL
Refills: 0 | Status: DISCONTINUED | OUTPATIENT
Start: 2021-05-05 | End: 2021-05-10

## 2021-05-05 RX ORDER — METOPROLOL TARTRATE 50 MG
1 TABLET ORAL
Qty: 0 | Refills: 0 | DISCHARGE

## 2021-05-05 RX ORDER — MORPHINE SULFATE 50 MG/1
5 CAPSULE, EXTENDED RELEASE ORAL
Refills: 0 | Status: DISCONTINUED | OUTPATIENT
Start: 2021-05-05 | End: 2021-05-05

## 2021-05-05 RX ORDER — CHLORHEXIDINE GLUCONATE 213 G/1000ML
1 SOLUTION TOPICAL
Refills: 0 | Status: DISCONTINUED | OUTPATIENT
Start: 2021-05-05 | End: 2021-05-10

## 2021-05-05 RX ORDER — SPIRONOLACTONE 25 MG/1
0.5 TABLET, FILM COATED ORAL
Qty: 0 | Refills: 0 | DISCHARGE

## 2021-05-05 RX ORDER — MORPHINE SULFATE 50 MG/1
5 CAPSULE, EXTENDED RELEASE ORAL ONCE
Refills: 0 | Status: DISCONTINUED | OUTPATIENT
Start: 2021-05-05 | End: 2021-05-05

## 2021-05-05 RX ORDER — FUROSEMIDE 40 MG
20 TABLET ORAL
Refills: 0 | Status: DISCONTINUED | OUTPATIENT
Start: 2021-05-05 | End: 2021-05-10

## 2021-05-05 RX ORDER — SPIRONOLACTONE 25 MG/1
12.5 TABLET, FILM COATED ORAL DAILY
Refills: 0 | Status: DISCONTINUED | OUTPATIENT
Start: 2021-05-05 | End: 2021-05-10

## 2021-05-05 RX ORDER — METOPROLOL TARTRATE 50 MG
25 TABLET ORAL DAILY
Refills: 0 | Status: DISCONTINUED | OUTPATIENT
Start: 2021-05-05 | End: 2021-05-10

## 2021-05-05 RX ORDER — HALOPERIDOL DECANOATE 100 MG/ML
5 INJECTION INTRAMUSCULAR ONCE
Refills: 0 | Status: COMPLETED | OUTPATIENT
Start: 2021-05-05 | End: 2021-05-05

## 2021-05-05 RX ORDER — CHOLESTYRAMINE 4 G/9G
1 POWDER, FOR SUSPENSION ORAL
Qty: 0 | Refills: 0 | DISCHARGE

## 2021-05-05 RX ORDER — QUETIAPINE FUMARATE 200 MG/1
50 TABLET, FILM COATED ORAL AT BEDTIME
Refills: 0 | Status: DISCONTINUED | OUTPATIENT
Start: 2021-05-05 | End: 2021-05-05

## 2021-05-05 RX ADMIN — MORPHINE SULFATE 5 MILLIGRAM(S): 50 CAPSULE, EXTENDED RELEASE ORAL at 17:52

## 2021-05-05 RX ADMIN — HALOPERIDOL DECANOATE 5 MILLIGRAM(S): 100 INJECTION INTRAMUSCULAR at 21:25

## 2021-05-05 RX ADMIN — MORPHINE SULFATE 5 MILLIGRAM(S): 50 CAPSULE, EXTENDED RELEASE ORAL at 19:45

## 2021-05-05 RX ADMIN — MORPHINE SULFATE 5 MILLIGRAM(S): 50 CAPSULE, EXTENDED RELEASE ORAL at 16:39

## 2021-05-05 RX ADMIN — Medication 1 MILLIGRAM(S): at 22:19

## 2021-05-05 RX ADMIN — MORPHINE SULFATE 5 MILLIGRAM(S): 50 CAPSULE, EXTENDED RELEASE ORAL at 20:09

## 2021-05-05 RX ADMIN — QUETIAPINE FUMARATE 50 MILLIGRAM(S): 200 TABLET, FILM COATED ORAL at 19:16

## 2021-05-05 RX ADMIN — MORPHINE SULFATE 5 MILLIGRAM(S): 50 CAPSULE, EXTENDED RELEASE ORAL at 22:02

## 2021-05-05 RX ADMIN — MORPHINE SULFATE 5 MILLIGRAM(S): 50 CAPSULE, EXTENDED RELEASE ORAL at 22:40

## 2021-05-05 NOTE — ED PROVIDER NOTE - PHYSICAL EXAMINATION
--EXAM--  VITAL SIGNS: I have reviewed vs documented at present.  CONSTITUTIONAL: Well-developed; well-nourished; in no acute distress.   SKIN: Warm and dry, no acute rash.   HEAD: Normocephalic; atraumatic.  EYES: PERRL, EOM intact; conjunctiva and sclera clear. No nystagmus.  ENT: No nasal discharge; airway clear.  NECK: Supple; non tender.  CARD: S1, S2, Regular rate and rhythm.   RESP: No wheezes, rales or rhonchi.  ABD: Normal bowel sounds; soft; non-distended; non-tender.  EXT: Normal ROM.   NEURO: a &o x1

## 2021-05-05 NOTE — H&P ADULT - HISTORY OF PRESENT ILLNESS
A 88 y/o F with  PMHx of HFrEF EF< 50%,  PPM, b/l knee OA, diverticulitis, immune thrombocytopenia , dementia on home hospice brought in by daughter because her father not able to take care at home.  Daughter reports pt has been under [facility] hospice since 1/4/20. Pt has been agitated and restless at home and her father started giving her ativan and morphine in past few days as per hospice recommendations. PT denies any symptoms at this time. Morphine solution was given in ED.   PT DNR/DNI, comfort care, no Tube feeds, copy of DNR/DNI placed on chart.

## 2021-05-05 NOTE — ED PROVIDER NOTE - CLINICAL SUMMARY MEDICAL DECISION MAKING FREE TEXT BOX
89F on hospice, DNR/DNI who was sent in from hospice for admission for possible long-term facility placement. Previously cared for and living his her elderly . Denies other complaints. Gen - NAD, Head - NCAT, Pharynx - clear, MMM, Heart - RRR, no m/g/r, Lungs - CTAB, no w/c/r, Abdomen - soft, NT, ND, Skin - No rash, Extremities - FROM, no edema, erythema, ecchymosis, brisk cap refill, Neuro - A&O x1 (self), nl strength and sensation. Labs reviewed, and pt admitted for placement.

## 2021-05-05 NOTE — ED PROVIDER NOTE - PMH
Cardiomyopathy    CHF (congestive heart failure)  HFrEF  Dementia    Diverticulitis    Ejection fraction < 50%    Hospice care patient    Immune thrombocytopenia    Intestinal perforation    Pacemaker

## 2021-05-05 NOTE — H&P ADULT - ATTENDING COMMENTS
patient seen and examined. Case discussed with ACP. H&P reviewed and agree with A&P  Plan of care discussed with daughter    Patient ias admitted for continuing comfort measures and hospice care. Patient is unable to be taken care of at home.    Plan as disucssed above  patient with dementia and behavioral changes- increase seroquel to 50 mg in the evening  palliative care consult

## 2021-05-05 NOTE — ED ADULT NURSE NOTE - NSFALLRSKPASTHIST_ED_ALL_ED
Clara Stallings is a 56 no

## 2021-05-05 NOTE — ED ADULT NURSE REASSESSMENT NOTE - NS ED NURSE REASSESS COMMENT FT1
Fall precautions in place, bed alarm, red socks, yellow bracelet, stretcher in lowest position, locked, side rails x two. Patient moved closer to RN station, continue to monitor and assess.

## 2021-05-05 NOTE — ED PROVIDER NOTE - OBJECTIVE STATEMENT
this is 88 yo female presents to ed for admission. patient is on hospice . hospice called ahead . patient is being for respite care. patient  needs to think about what he will do with her long term.   patient may need to be placed in long term care facilitiy

## 2021-05-05 NOTE — ED ADULT TRIAGE NOTE - CHIEF COMPLAINT QUOTE
BIBA from home as per patient's daughter : Lou Earl ([phone]) She is on hospice for CHF and Dementia. We brought her here for respite care for five days and placement".

## 2021-05-05 NOTE — ED ADULT NURSE NOTE - PMH
Cardiomyopathy    CHF (congestive heart failure)  HFrEF  Dementia    Diverticulitis    Ejection fraction < 50%    Hospice care patient    Immune thrombocytopenia    Intestinal perforation    Pacemaker     Cardiomyopathy    CHF (congestive heart failure)  HFrEF  Dementia    Diverticulitis    Ejection fraction < 50%    Hospice care patient    Immune thrombocytopenia    Intestinal perforation    Pacemaker    Pacemaker

## 2021-05-05 NOTE — ED ADULT NURSE NOTE - CHIEF COMPLAINT QUOTE
BIBA from home as per patient's daughter : Lou Earl ([phone]) She is on hospice for CHF and Dementia. We brought her here for respite care for five days and placement".

## 2021-05-05 NOTE — H&P ADULT - NSHPPHYSICALEXAM_GEN_ALL_CORE
VITALS:       ICU Vital Signs Last 24 Hrs  T(C): 36.6 (05 May 2021 14:04), Max: 36.6 (05 May 2021 14:04)  T(F): 97.9 (05 May 2021 14:04), Max: 97.9 (05 May 2021 14:04)  HR: 60 (05 May 2021 14:04) (60 - 60)  BP: 129/57 (05 May 2021 14:04) (129/57 - 129/57)  RR: 18 (05 May 2021 14:04) (18 - 18)  SpO2: 98% (05 May 2021 14:04) (98% - 98%)      GENERAL: NAD, lying in bed comfortably  HEAD:  Atraumatic, Normocephalic  EYES: EOMI, PERRLA, conjunctiva and sclera clear  ENT: Moist mucous membranes  NECK: Supple, No JVD  CHEST/LUNG: Clear to auscultation bilaterally; No rales, rhonchi, wheezing, or rubs. Unlabored respirations, PPM on left upper chest   HEART: Regular rate and rhythm; No murmurs, rubs, or gallops  ABDOMEN: Bowel sounds present; Soft, Nontender, Nondistended. No hepatomegally  EXTREMITIES:  edema no tenderness   NERVOUS SYSTEM:  Alert & Oriented X3, speech clear. No deficits   MSK: FROM all 4 extremities, full and equal strength  SKIN: No rashes or lesions

## 2021-05-05 NOTE — H&P ADULT - ASSESSMENT
A 89 y/o F with  PMHx of HFrEF EF< 50%,  PPM, b/l knee OA, diverticulitis, immune thrombocytopenia , dementia on home hospice brought in by daughter because her father not able to take care at home for placement.      #Hospice care  -Pt DNR/DNI  -continue ativan and morphine   -hospice consult  -case management for placement       #Chronic systolic and diastolic congestive heart failure   -continue Lasix   -continue spironolactone       #Severe aortic stenosis   -continue metoprolol    #Osteoarthritis   - pain control as per Hospice     #Dementia  -continue quetiapine     #low BP  -metolazone

## 2021-05-06 LAB
COVID-19 SPIKE DOMAIN AB INTERP: NEGATIVE — SIGNIFICANT CHANGE UP
COVID-19 SPIKE DOMAIN ANTIBODY RESULT: 0.4 U/ML — SIGNIFICANT CHANGE UP
SARS-COV-2 IGG+IGM SERPL QL IA: 0.4 U/ML — SIGNIFICANT CHANGE UP
SARS-COV-2 IGG+IGM SERPL QL IA: NEGATIVE — SIGNIFICANT CHANGE UP

## 2021-05-06 PROCEDURE — 99232 SBSQ HOSP IP/OBS MODERATE 35: CPT

## 2021-05-06 RX ORDER — OLANZAPINE 15 MG/1
2.5 TABLET, FILM COATED ORAL
Refills: 0 | Status: DISCONTINUED | OUTPATIENT
Start: 2021-05-06 | End: 2021-05-10

## 2021-05-06 RX ORDER — MIRTAZAPINE 45 MG/1
7.5 TABLET, ORALLY DISINTEGRATING ORAL
Refills: 0 | Status: DISCONTINUED | OUTPATIENT
Start: 2021-05-06 | End: 2021-05-10

## 2021-05-06 RX ORDER — OLANZAPINE 15 MG/1
2.5 TABLET, FILM COATED ORAL EVERY 8 HOURS
Refills: 0 | Status: DISCONTINUED | OUTPATIENT
Start: 2021-05-06 | End: 2021-05-10

## 2021-05-06 RX ADMIN — MORPHINE SULFATE 5 MILLIGRAM(S): 50 CAPSULE, EXTENDED RELEASE ORAL at 10:20

## 2021-05-06 RX ADMIN — OLANZAPINE 2.5 MILLIGRAM(S): 15 TABLET, FILM COATED ORAL at 17:29

## 2021-05-06 RX ADMIN — Medication 20 MILLIGRAM(S): at 10:21

## 2021-05-06 RX ADMIN — MIRTAZAPINE 7.5 MILLIGRAM(S): 45 TABLET, ORALLY DISINTEGRATING ORAL at 21:11

## 2021-05-06 RX ADMIN — MORPHINE SULFATE 5 MILLIGRAM(S): 50 CAPSULE, EXTENDED RELEASE ORAL at 14:25

## 2021-05-06 NOTE — CHART NOTE - NSCHARTNOTEFT_GEN_A_CORE
Consult received. Discussed with attending Dr Snow. Patient is CMO and admitted for hospice placement. Discussed symptom management with attending. Will follow up on 5/7.

## 2021-05-07 VITALS
DIASTOLIC BLOOD PRESSURE: 63 MMHG | SYSTOLIC BLOOD PRESSURE: 132 MMHG | HEART RATE: 87 BPM | TEMPERATURE: 98 F | RESPIRATION RATE: 16 BRPM

## 2021-05-07 DIAGNOSIS — Z51.5 ENCOUNTER FOR PALLIATIVE CARE: ICD-10-CM

## 2021-05-07 DIAGNOSIS — I50.9 HEART FAILURE, UNSPECIFIED: ICD-10-CM

## 2021-05-07 DIAGNOSIS — M89.49 OTHER HYPERTROPHIC OSTEOARTHROPATHY, MULTIPLE SITES: ICD-10-CM

## 2021-05-07 DIAGNOSIS — F03.91 UNSPECIFIED DEMENTIA WITH BEHAVIORAL DISTURBANCE: ICD-10-CM

## 2021-05-07 PROCEDURE — 99223 1ST HOSP IP/OBS HIGH 75: CPT

## 2021-05-07 PROCEDURE — 99231 SBSQ HOSP IP/OBS SF/LOW 25: CPT

## 2021-05-07 RX ADMIN — Medication 40 MILLIGRAM(S): at 13:14

## 2021-05-07 RX ADMIN — MIRTAZAPINE 7.5 MILLIGRAM(S): 45 TABLET, ORALLY DISINTEGRATING ORAL at 22:11

## 2021-05-07 RX ADMIN — OLANZAPINE 2.5 MILLIGRAM(S): 15 TABLET, FILM COATED ORAL at 17:46

## 2021-05-07 RX ADMIN — Medication 1 MILLIGRAM(S): at 03:21

## 2021-05-07 NOTE — CONSULT NOTE ADULT - ASSESSMENT
89yFemale being evaluated for GOC and symptom management. patient has a hx of  HFrEF,  PPM, b/l knee OA, diverticulitis, immune thrombocytopenia , dementia on home hospice brought in by daughter because her father not able to take care at home for placement.  Patient with advanced demenita; able to do minimal walking with walker and assistance  Patient seen and examined at bedside today. She was lethargic and not able to participate.  Spoke with daughter Lou, confirmed CMO. Patient to possible go on hospice to NH on Monday.    MEDD (morphine equivalent daily dose): 5mg       See Recs below.    Please call [phone] with questions or concerns 24/7.   We will continue to follow.   D/W primary team, bedside RN and ken

## 2021-05-07 NOTE — CONSULT NOTE ADULT - PROBLEM SELECTOR RECOMMENDATION 5
Called daughter. Confirmed CMO  Patient is DNR/DNI/ CMO  Plan is for dc with hospice to NH on Monday.

## 2021-05-07 NOTE — HOSPICE CARE NOTE - CONVESATION DETAILS
Active communication with Gina Dubon on D/C plan for Hospice Inpatient. PCR to be completed Saturday for transfer to [facility] Monday morning. Transport arranged by Hospice Department for Monday morning at 9 AM pending negative Covid result. Please have D/C prepared.

## 2021-05-07 NOTE — CONSULT NOTE ADULT - PROBLEM SELECTOR RECOMMENDATION 4
Continue metolazone 2.5 mg po daily  Lasix 20 mg Three times a week and 40 mg three times a week  Continue spironolactone 12.5 mg daily

## 2021-05-07 NOTE — CHART NOTE - NSCHARTNOTEFT_GEN_A_CORE
PALLIATIVE MEDICINE INTERDISCIPLINARY TEAM NOTE    Provider:  [ xx  ]Social Work   [   ]          [  xx ] Initial visit [   ] Follow up    Family or contact name / phone #   Met with: [   ] Patient  [  xx ] Family  [   ] Other:    Primary Language: [  xx ] English [   ] Other*:                      *Interpretation provided by:    SUPPORT DIAGNOSES            (Check all that apply)  [ xx  ] Psychosocial spiritual assessment (PSSA)  [   ] EOL issues  [   ] Cultural / spiritual concerns  [   ] Pain / suffering  [   ] Dementia / AMS  [   ] Other:  [   ] AD issues  [   ] Grief / loss / sadness  [   ] Discharge issues  [   ] Distress / coping    PSYCHOSOCIAL ASSESSMENT OF PATIENT         (Check all that apply)  [ xx  ] Initial Assessment            [   ] Reassessment          [   ] Not Applicable this visit    Pain/suffering acuity:  [  xx ] None to mild (0-3)           [   ] Moderate (4-6)        [   ] High (7-10)    Mental Status:  [   ] Alert/oriented (x3)          [ xx  ] Confused/Altered(x2/x1)         [   ] Non-resp    Functional status:  [   ] Independent w ADLs      [   ] Needs Assistance             [ xx  ] Bedbound/Full Care    Coping:  [   ] Coping well                     [ xx  ] Coping w/difficulty            [   ] Poor coping    Support system:  [   ] Strong                              [  xx ] Adequate                        [   ] Inadequate    SPIRITUAL ASSESSMENT  Jainism/Spiritual practice: ___________________________    Role of organized Temple:  [   ] Important                     [   ] Some (fam tradition, cultural)               [   ] None    Effects on medical care:  [   ] Yes, _____________________________________                         [   ] None    Cultural/Samaritan need:  [   ] Yes, _____________________________________                         [   ] None    Refer to Pastoral Care:  [   ] Yes           [   ] No, not at this time    SERVICE PROVIDED  [   ]PSSA                                                                             [   ]Discharge support / facilitation  [   ]AD / goals of care counseling                                  [   ]EOL / death / bereavement counseling  [ xx  ]Counseling / support                                                [   ] Family meeting  [   ]Prayer / sacrament / ritual                                      [   ] Referral   [   ]Other                                                                       NOTE and Plan of Care (PoC):  Patient is a  89 y/o F with  PMHx of HFrEF EF< 50%,  PPM, b/l knee OA, diverticulitis, immune thrombocytopenia , dementia on home hospice brought in by daughter because her father not able to take care at home.  Daughter reports pt has been under [facility] hospice since 1/4/20. Pt has been agitated and restless at home and her father started giving her ativan and morphine in past few days as per hospice recommendations. PT denies any symptoms at this time. Morphine solution was given in ED.  Palliative care team reached out tot he patients daughter, she states families wishes are for patient to be comfortable and free of pain. She states patient's spouse is unable to care for her at home and she should be going to a facility on Monday for complete comfort care.    Support provided as appropriate. Palliative care team will remain available as needed.

## 2021-05-07 NOTE — CONSULT NOTE ADULT - PROBLEM SELECTOR RECOMMENDATION 9
DNR/DNI  CMO  DC to hospice planning underway  No IVF  cassie blood draws  No vital signs  No antibiotics  Roxanol 5 mg po Q2h PRN  Ativan 1 mg Q6h PRN   Olanzapine 2.5 mg po HS  Olanzapine 2.5 mg po Q8h PRN

## 2021-05-08 LAB — SARS-COV-2 RNA SPEC QL NAA+PROBE: SIGNIFICANT CHANGE UP

## 2021-05-08 PROCEDURE — 99231 SBSQ HOSP IP/OBS SF/LOW 25: CPT

## 2021-05-08 RX ADMIN — OLANZAPINE 2.5 MILLIGRAM(S): 15 TABLET, FILM COATED ORAL at 18:01

## 2021-05-08 RX ADMIN — Medication 20 MILLIGRAM(S): at 08:47

## 2021-05-08 RX ADMIN — CHLORHEXIDINE GLUCONATE 1 APPLICATION(S): 213 SOLUTION TOPICAL at 05:44

## 2021-05-08 RX ADMIN — Medication 1 MILLIGRAM(S): at 05:46

## 2021-05-08 RX ADMIN — MIRTAZAPINE 7.5 MILLIGRAM(S): 45 TABLET, ORALLY DISINTEGRATING ORAL at 20:19

## 2021-05-08 RX ADMIN — OLANZAPINE 2.5 MILLIGRAM(S): 15 TABLET, FILM COATED ORAL at 11:28

## 2021-05-08 NOTE — PROGRESS NOTE ADULT - SUBJECTIVE AND OBJECTIVE BOX
HPI  Patient is a 89y old Female who presents with a chief complaint of hospice care, placement (07 May 2021 11:35)    Currently admitted to medicine with the primary diagnosis of Hospice care patient       Today is hospital day 2d.     INTERVAL HPI / OVERNIGHT EVENTS:  Patient was examined and seen at bedside. This morning she is resting comfortably in bed and reports no new issues or overnight events.         PAST MEDICAL & SURGICAL HISTORY  Immune thrombocytopenia    Cardiomyopathy    Dementia    CHF (congestive heart failure)  HFrEF    Pacemaker    Ejection fraction &lt; 50%    Diverticulitis    Intestinal perforation    Hospice care patient    Pacemaker    Artificial cardiac pacemaker    History of cholecystectomy    History of intestine removal      ALLERGIES  No Known Allergies    MEDICATIONS  STANDING MEDICATIONS  chlorhexidine 4% Liquid 1 Application(s) Topical <User Schedule>  furosemide    Tablet 40 milliGRAM(s) Oral <User Schedule>  furosemide    Tablet 20 milliGRAM(s) Oral <User Schedule>  metolazone 2.5 milliGRAM(s) Oral <User Schedule>  metoprolol succinate ER 25 milliGRAM(s) Oral daily  mirtazapine 7.5 milliGRAM(s) Oral <User Schedule>  OLANZapine 2.5 milliGRAM(s) Oral <User Schedule>  spironolactone 12.5 milliGRAM(s) Oral daily    PRN MEDICATIONS  LORazepam     Tablet 1 milliGRAM(s) Oral every 6 hours PRN  morphine Concentrate 5 milliGRAM(s) Oral every 2 hours PRN  OLANZapine 2.5 milliGRAM(s) Oral every 8 hours PRN    VITALS:  T(F): 97.5  HR: 87  BP: 132/63  RR: 16  SpO2: --    PHYSICAL EXAM  GEN: NAD, Resting comfortably in bed  PULM: normal respiration  ABD: suprapubic fullness  NEURO: awake and alert    LABS                        RADIOLOGY    
S:   Patient looks comfortable. Denies any pain  patient had to be placed back on box catheter for urinary retention  patient tolerating diet ; helped by staff  no other reported issues per staff    O/e  comforatble
S: patient feeling okay; but asking for helping. patient cannot explain what she need help with    O: patient is comfortable. not in distress
done

## 2021-05-08 NOTE — PROGRESS NOTE ADULT - ASSESSMENT
A 88 y/o F with  PMHx of HFrEF,  PPM, b/l knee OA, diverticulitis, immune thrombocytopenia , dementia on home hospice brought in by daughter because her father not able to take care at home for placement.  Patient with advanced demenita; able to do minimal walking with walker and assistance    # patient on comfort measures. patient was under home Hospice care  DNR/DNI  -continue ativan and morphine PRN  -hospice and palliative care team is following    # urinary retention- will catheterize to empty bladder; will monitor for retention. If retaining will plan for box catheter; discussed with family    # dementia with sun downing  zyprexa 2.5 mg at 5pm and remeron at 8PM  zyprexa PRN    #Chronic systolic and diastolic congestive heart failure- stable  -continue Lasix , metolazone , metoprolol  -continue spironolactone     #Severe aortic stenosis     #Osteoarthritis     plan of care discussed with nu
A 89 y/o F with  PMHx of HFrEF,  PPM, b/l knee OA, diverticulitis, immune thrombocytopenia , dementia on home hospice brought in by daughter because her father not able to take care at home for placement.  Patient with advanced demenita; able to do minimal walking with walker and assistance    # patient on comfort measures. patient was under home Hospice care  DNR/DNI  -continue ativan and morphine PRN  -hospice and palliative care team is consulted  -plan of care discussed with case management    # dementia with sun downing  zyprexa 2.5 mg at 5pm and remeron at 8PM  zyprexa PRN    #Chronic systolic and diastolic congestive heart failure  -continue Lasix , metolazone   -continue spironolactone     #Severe aortic stenosis   -continue metoprolol    #Osteoarthritis   - pain control as per Hospice                     
` 88 y/o F with  PMHx of HFrEF,  PPM, b/l knee OA, diverticulitis, immune thrombocytopenia , dementia on home hospice brought in by daughter because her father not able to take care at home for placement.  Patient with advanced demenita; able to do minimal walking with walker and assistance    # patient on comfort measures. patient was under home Hospice care  DNR/DNI  -continue ativan and morphine PRN- discussed with nursing staff  -hospice and palliative care team is following    # [facility]    # dementia with sun downing  zyprexa 2.5 mg at 5pm and remeron at 8PM  zyprexa PRN    #Chronic systolic and diastolic congestive heart failure- stable  -continue Lasix , metolazone , metoprolol  -continue spironolactone     #Severe aortic stenosis     #Osteoarthritis     plan of care discussed with nu on 5/7  doet changed to mechanical soft  COVID swab on 5/8  discharge plan to hospice facility on 5/10

## 2021-05-08 NOTE — HOSPICE CARE NOTE - CONVESATION DETAILS
Patient pending transport to NH facility to continue on hospice services Monday at 9 AM. Transport arranged by Hospice Department. Covid swab negative x 2. Please prepare D/C.

## 2021-05-09 ENCOUNTER — TRANSCRIPTION ENCOUNTER (OUTPATIENT)
Age: 86
End: 2021-05-09

## 2021-05-09 PROCEDURE — 99231 SBSQ HOSP IP/OBS SF/LOW 25: CPT

## 2021-05-09 RX ORDER — QUETIAPINE FUMARATE 200 MG/1
1 TABLET, FILM COATED ORAL
Qty: 30 | Refills: 0
Start: 2021-05-09 | End: 2021-06-07

## 2021-05-09 RX ORDER — MIRTAZAPINE 45 MG/1
1 TABLET, ORALLY DISINTEGRATING ORAL
Qty: 30 | Refills: 0
Start: 2021-05-09 | End: 2021-06-07

## 2021-05-09 RX ORDER — OLANZAPINE 15 MG/1
1 TABLET, FILM COATED ORAL
Qty: 0 | Refills: 0 | DISCHARGE
Start: 2021-05-09

## 2021-05-09 RX ORDER — QUETIAPINE FUMARATE 200 MG/1
0 TABLET, FILM COATED ORAL
Qty: 0 | Refills: 0 | DISCHARGE

## 2021-05-09 RX ADMIN — Medication 25 MILLIGRAM(S): at 05:34

## 2021-05-09 RX ADMIN — OLANZAPINE 2.5 MILLIGRAM(S): 15 TABLET, FILM COATED ORAL at 17:46

## 2021-05-09 RX ADMIN — SPIRONOLACTONE 12.5 MILLIGRAM(S): 25 TABLET, FILM COATED ORAL at 05:33

## 2021-05-09 RX ADMIN — Medication 20 MILLIGRAM(S): at 08:49

## 2021-05-09 RX ADMIN — MIRTAZAPINE 7.5 MILLIGRAM(S): 45 TABLET, ORALLY DISINTEGRATING ORAL at 20:47

## 2021-05-09 NOTE — HOSPICE CARE NOTE - CONVESATION DETAILS
Notified Resident Dr Snow and Staff RN Carlota that patient is pending transfer to [facility] Monday 5/10 at 9 AM. Transport coordinated by Hospice Department. Dr Snow to iniate D/C and have order in for tomorrow.

## 2021-05-09 NOTE — DISCHARGE NOTE PROVIDER - HOSPITAL COURSE
` 88 y/o F with  PMHx of HFrEF,  PPM, b/l knee OA, diverticulitis, immune thrombocytopenia , dementia on home hospice brought in by daughter because her father not able to take care at home for placement.  Patient with advanced demenita; able to do minimal walking with walker and assistance    # patient on comfort measures. patient was under home Hospice care  DNR/DNI  -continue ativan and morphine PRN  -hospice and palliative care team is following and making arrangements on continuing hospice care at [facility]    # urinary retention- box cahteter placed on 5/7    # dementia with sun downing-stable with current regime  zyprexa 2.5 mg at 5pm and remeron at 8PM  zyprexa PRN    #Chronic systolic and diastolic congestive heart failure- stable  -continue Lasix , metolazone , metoprolol  -continue spironolactone     #Severe aortic stenosis     #Osteoarthritis     Today patient looked comfortable and denies any complaints

## 2021-05-09 NOTE — DISCHARGE NOTE PROVIDER - NSDCCPCAREPLAN_GEN_ALL_CORE_FT
PRINCIPAL DISCHARGE DIAGNOSIS  Diagnosis: Hospice care patient  Assessment and Plan of Treatment: Plan to continue with comfort measures      SECONDARY DISCHARGE DIAGNOSES  Diagnosis: Dementia  Assessment and Plan of Treatment:

## 2021-05-09 NOTE — DISCHARGE NOTE PROVIDER - NSDCMRMEDTOKEN_GEN_ALL_CORE_FT
furosemide 20 mg oral tablet: 0.5 tab(s) orally, Tues, Thurs, Sat, Sun  furosemide 40 mg oral tablet: 1 tab(s) orally once a day on MWF  LORazepam 1 mg oral tablet: orally every 6 hours, As Needed for agitation ; if not helped by zyprexa  metOLazone 2.5 mg oral tablet: MWF  Metoprolol Succinate ER 25 mg oral tablet, extended release: 1 tab(s) orally once a day  mirtazapine 7.5 mg oral tablet: 1 tab(s) orally once a day at 8 PM  morphine 20 mg/mL oral solution: Morphine sulfate solution (20mg/ml) take 5 mg (0.25 ml) every two hours under the tongue as needed for SOB or pain  OLANZapine 2.5 mg oral tablet: 1 tab(s) orally once a day, As Needed for agitation  QUEtiapine 25 mg oral tablet: 1 tab(s) orally once a day at 5 PM  spironolactone 25 mg oral tablet: 0.5 tab(s) orally once a day

## 2021-05-10 ENCOUNTER — TRANSCRIPTION ENCOUNTER (OUTPATIENT)
Age: 86
End: 2021-05-10

## 2021-05-10 PROCEDURE — 99238 HOSP IP/OBS DSCHRG MGMT 30/<: CPT

## 2021-05-10 RX ADMIN — Medication 25 MILLIGRAM(S): at 04:41

## 2021-05-10 RX ADMIN — SPIRONOLACTONE 12.5 MILLIGRAM(S): 25 TABLET, FILM COATED ORAL at 04:42

## 2021-05-10 NOTE — CHART NOTE - NSCHARTNOTEFT_GEN_A_CORE
Patient was seen. patient is comfortable and denies any pain  Discharge arrangements are made for SNF with hospice care  please see discharge summary.

## 2021-05-10 NOTE — DISCHARGE NOTE NURSING/CASE MANAGEMENT/SOCIAL WORK - PATIENT PORTAL LINK FT
You can access the FollowMyHealth Patient Portal offered by [facility] by registering at the following website: [email or web address]. By joining [employer]’s FollowMyHealth portal, you will also be able to view your health information using other applications (apps) compatible with our system.

## 2021-05-14 DIAGNOSIS — I42.9 CARDIOMYOPATHY, UNSPECIFIED: ICD-10-CM

## 2021-05-14 DIAGNOSIS — M19.90 UNSPECIFIED OSTEOARTHRITIS, UNSPECIFIED SITE: ICD-10-CM

## 2021-05-14 DIAGNOSIS — Z95.0 PRESENCE OF CARDIAC PACEMAKER: ICD-10-CM

## 2021-05-14 DIAGNOSIS — I35.0 NONRHEUMATIC AORTIC (VALVE) STENOSIS: ICD-10-CM

## 2021-05-14 DIAGNOSIS — F03.90 UNSPECIFIED DEMENTIA WITHOUT BEHAVIORAL DISTURBANCE: ICD-10-CM

## 2021-05-14 DIAGNOSIS — I95.9 HYPOTENSION, UNSPECIFIED: ICD-10-CM

## 2021-05-14 DIAGNOSIS — R33.9 RETENTION OF URINE, UNSPECIFIED: ICD-10-CM

## 2021-05-14 DIAGNOSIS — R45.1 RESTLESSNESS AND AGITATION: ICD-10-CM

## 2021-05-14 DIAGNOSIS — I50.42 CHRONIC COMBINED SYSTOLIC (CONGESTIVE) AND DIASTOLIC (CONGESTIVE) HEART FAILURE: ICD-10-CM

## 2021-05-14 DIAGNOSIS — Z51.5 ENCOUNTER FOR PALLIATIVE CARE: ICD-10-CM

## 2021-05-14 DIAGNOSIS — D69.3 IMMUNE THROMBOCYTOPENIC PURPURA: ICD-10-CM

## 2021-05-14 DIAGNOSIS — M17.0 BILATERAL PRIMARY OSTEOARTHRITIS OF KNEE: ICD-10-CM

## 2021-05-14 DIAGNOSIS — Z66 DO NOT RESUSCITATE: ICD-10-CM

## 2021-10-15 NOTE — PROGRESS NOTE ADULT - ASSESSMENT
aicd   acute shf on chronic shf   hfref   change in mental status now baseline   htn   sob   severe AS nonoperable or tavrable   ITP     pt now improved, change lasix to 40 mg po q12 today   get PT 86 year old lady with PMHx ITP, diverticulitis, PPM presents after an episode of AMS a/w SOB, RAYA, orthopnea.    #SOB Likely secondary to CHF Exacerbation   -BNP 54376 on admission  -CXR: interval CHF  -cardio- change lasix to 40 mg po q12 today and qd tomorrow. severe AS nonoperable or tavrable    -monitor strict I&O, daily weights  -1.5L/day fluid restriction  -c/w BiPAP PRN  -TTE- EF 29%, severe AS, G1DD, mod pleural effusion L lateral, mod MVR, mod TR, mild AR       #AMS 2/2 UTI vs underlying dementia   -WBC 15 on admission, afebrile. WBC 7.21 now. No complaints of dysuria, increased frequency.   -UA(+) in ED  -UCx >100,000 E coli  -Rocephin stopped per Dr. Doherty     #ITP  -stable  -c/w home meds    #Abnormal LFT's   RUQ Abdominal sonogram- Liver normal, S/p cholecystectomy. Mild right hydronephrosis. Right pleural effusion.   Repeat LFT's in AM.     DVT ppx  GI ppx  CHG 4% daily  ambulate as tolerated  DASH/TLC with 1.5L/day fluid restriction    Dispo  -from home, lives with   -PT/rehab pending    FULL CODE 98.4

## 2022-04-25 NOTE — ED ADULT NURSE NOTE - OBJECTIVE STATEMENT
Follow up with Dr barahona in 1-2 weeks    Wound Care:   The day AFTER your procedure remove bandage GENTLY, and clean using  mild soap and gentle warm, water stream, pat dry. leave OPEN to air. YOU MAY SHOWER   DO NOT apply lotions, creams, ointments, powder, perfumes to your incision site  DO NOT SOAK your site for 1 week ( no baths, no pools, no tubs, etc...)  Check  your groin and /or wrist daily. A small amount of bruising, and soreness are normal    ACTIVITY: for 24 hours   - DO NOT DRIVE  - DO NOT make any important decisions or sign legal documents   - DO NOT operate heavy machineries   - you may resume sexual activity in 48 hours, unless otherwise instructed by your cardiologist     Your procedure was done through the WRIST: for the NEXT 3DAYS:  - avoid pushing, pulling, with that affected wrist   - avoid repeated movement of that hand and wrist (eg: typing, hammering)  - DO NOT LIFT anything more than 5 lbs     MEDICATION:   Take your medications as explained (see discharge paperwork)   If you received a STENT, you will be taking antiplatelet medications to KEEP YOUR STENT OPEN (eg: Aspirin, Plavix, Brilinta, Effient, etc).  Take as prescribed DO NOT STOP taking them without consulting with your cardiologist first.     Follow heart healthy diet recommended by your doctor, if you smoke STOP SMOKING ( may call [phone] for center of tobacco control if you need assistance)     CALL your doctor to make appointment in 2 WEEKS     ***CALL YOUR DOCTOR***  if you experience: fever, chills, body aches, or severe pain, swelling, redness, heat or yellow discharge at incision site  If you experience Bleeding or excruciating pain at the procedural site, swelling ( golf ball size) at your procedural site  If you experience CHEST PAIN  If you experience extremity numbness, tingling, temperature change ( of your procedural site)   If you are unable to reach your doctor, you may contact:   -Cardiology Office at [facility] at [phone] or   - [facility] [phone]  - [facility] [phone] Follow up with Dr lloyd in 1-2 weeks.  Your metoprolol XL 50mg that was started recently was discontinued prior discharge because your heart rate is slow, at 54 today.  Your Plavix 75mg that was started after abnormal stress test was discontinues today since you have normal coronaries.  Please follow up with Dr Lloyd.      Wound Care:   The day AFTER your procedure remove bandage GENTLY, and clean using  mild soap and gentle warm, water stream, pat dry. leave OPEN to air. YOU MAY SHOWER   DO NOT apply lotions, creams, ointments, powder, perfumes to your incision site  DO NOT SOAK your site for 1 week ( no baths, no pools, no tubs, etc...)  Check  your groin and /or wrist daily. A small amount of bruising, and soreness are normal    ACTIVITY: for 24 hours   - DO NOT DRIVE  - DO NOT make any important decisions or sign legal documents   - DO NOT operate heavy machineries   - you may resume sexual activity in 48 hours, unless otherwise instructed by your cardiologist     Your procedure was done through the WRIST: for the NEXT 3DAYS:  - avoid pushing, pulling, with that affected wrist   - avoid repeated movement of that hand and wrist (eg: typing, hammering)  - DO NOT LIFT anything more than 5 lbs     MEDICATION:   Take your medications as explained (see discharge paperwork)   If you received a STENT, you will be taking antiplatelet medications to KEEP YOUR STENT OPEN (eg: Aspirin, Plavix, Brilinta, Effient, etc).  Take as prescribed DO NOT STOP taking them without consulting with your cardiologist first.     Follow heart healthy diet recommended by your doctor, if you smoke STOP SMOKING ( may call [phone] for center of tobacco control if you need assistance)     CALL your doctor to make appointment in 2 WEEKS     ***CALL YOUR DOCTOR***  if you experience: fever, chills, body aches, or severe pain, swelling, redness, heat or yellow discharge at incision site  If you experience Bleeding or excruciating pain at the procedural site, swelling ( golf ball size) at your procedural site  If you experience CHEST PAIN  If you experience extremity numbness, tingling, temperature change ( of your procedural site)   If you are unable to reach your doctor, you may contact:   -Cardiology Office at [facility] at [phone] or   - [facility] [phone]  - [facility] [phone] Pt c/o increase weakness gradually allong with sob

## 2023-04-03 NOTE — ED PROVIDER NOTE - BIRTH SEX
Lab Results   Component Value Date    HGBA1C 7 6 (H) 01/03/2023       Recent Labs     04/02/23 2136   POCGLU 93       Blood Sugar Average: Last 72 hrs:  (P) 93     · Placed on [facility] step 2 diet  · Hold prehospital oral antihyperglycemic's  · Continue prehospital Levemir  · Obtain Accu-Cheks before meals and at bedtime with Humalog correction dose before meals and at bedtime Female

## 2023-07-20 NOTE — DISCHARGE NOTE PROVIDER - NSDCQMSTROKE_NEU_ALL_CORE
HF TCC Provider Note (Follow-up) Consult Note      HPI:  Patient can walk without SOB   Patient sleeps on 2 pillows   Patient wakes up SOB, has to get out of bed, associated cough, sputum none   Palpitations - none   Dizzy, light-headed, pre-syncope or syncope none   Since discharge frequency of performing weights, home weight and weight change 252 pounds    Other information felt pertinent to HPI    Since last visit has done well. Back at work. No SOB, walked to clinic    No PND, on lasix 40 mg BID    Has some mild LE edema     2 pillow orthopnea, ok with gout no recent flares      PHYSICAL:   Vitals:    07/20/23 1303   BP: 126/62   Pulse: 80      Wt Readings from Last 3 Encounters:   07/20/23 118.1 kg (260 lb 5.8 oz)   06/20/23 114.4 kg (252 lb 3.3 oz)   06/14/23 119.6 kg (263 lb 10.7 oz)       JVD: no,    Heart rhythm: regular  Cardiac murmur: No    S3: no  S4: no  Lungs: clear  Liver span: 10 cm:   Hepatojugular reflux: no  Edema: 1+    ASSESSMENT: chromic systolic HF    PLAN:      Patient Instructions:   Instruct the patient to notify this clinic if HH, a physician or an advanced care provider wants to change medication one of their HF medications   Activity and Diet restrictions:   Recommend 2-3 gram sodium restriction and 1500cc- 2000cc fluid restriction.  Encourage physical activity with graded exercise program.  Requested patient to weigh themselves daily, and to notify us if their weight increases by more than 3 lbs in 1 day or 5 lbs in 3 days.    Assigned dry weight on home scale: 114 kg  Medication changes (include current dose and changed dose)  Start jardiance for HF 10 mg daily  Continue ARNi, BB, MRA for GDMT  Labs today  RTC 6 weeks  Refer to new PCP to establish care             
No

## 2024-09-18 NOTE — PHYSICAL THERAPY INITIAL EVALUATION ADULT - IMPAIRMENTS CONTRIBUTING TO GAIT DEVIATIONS, PT EVAL
----- Message from Serjio Rondon sent at 9/18/2024  2:26 PM CDT -----  Type: Needs Medical Advice  Who Called:  pt  Pharmacy name and phone #:    Best Call Back Number: [phone]  Additional Information: pt is calling the office for a call back from Dr alia pereira  
Spoke to pt. Fax number provided. CPAP orders to be signed.   
cognition/impaired postural control/impaired balance/impaired coordination/decreased strength

## 2025-05-01 NOTE — ED PROVIDER NOTE - INTERPRETATION
Hpi Title: Evaluation of Skin Lesions Additional History: Pt says that she has a mole on her R forearm that she thinks is new and would like checked. normal sinus rhythm

## 2025-05-05 NOTE — PATIENT PROFILE ADULT - NSASFUNCLEVELADLTRANSFER_GEN_A_NUR
2 = assistive person Bed/Stretcher in lowest position, wheels locked, appropriate side rails in place/Call bell, personal items and telephone in reach/Instruct patient to call for assistance before getting out of bed/chair/stretcher/Non-slip footwear applied when patient is off stretcher/[place] to call system/Physically safe environment - no spills, clutter or unnecessary equipment/Purposeful proactive rounding/Room/bathroom lighting operational, light cord in reach